# Patient Record
Sex: FEMALE | Race: OTHER | Employment: UNEMPLOYED
[De-identification: names, ages, dates, MRNs, and addresses within clinical notes are randomized per-mention and may not be internally consistent; named-entity substitution may affect disease eponyms.]

---

## 2023-12-16 ENCOUNTER — HOSPITAL ENCOUNTER (EMERGENCY)
Facility: HOSPITAL | Age: 53
Discharge: HOME | End: 2023-12-17
Attending: EMERGENCY MEDICINE
Payer: MEDICAID

## 2023-12-16 ENCOUNTER — APPOINTMENT (OUTPATIENT)
Facility: HOSPITAL | Age: 53
End: 2023-12-16
Payer: MEDICAID

## 2023-12-16 DIAGNOSIS — R91.1 INCIDENTAL PULMONARY NODULE: ICD-10-CM

## 2023-12-16 DIAGNOSIS — N20.0 KIDNEY STONE: ICD-10-CM

## 2023-12-16 DIAGNOSIS — K59.00 CONSTIPATION, UNSPECIFIED CONSTIPATION TYPE: Primary | ICD-10-CM

## 2023-12-16 LAB
ALBUMIN SERPL-MCNC: 3.2 G/DL (ref 3.4–5)
ALBUMIN/GLOB SERPL: 0.8 (ref 0.8–1.7)
ALP SERPL-CCNC: 150 U/L (ref 45–117)
ALT SERPL-CCNC: 227 U/L (ref 13–56)
ANION GAP SERPL CALC-SCNC: 6 MMOL/L (ref 3–18)
APPEARANCE UR: CLEAR
AST SERPL-CCNC: 141 U/L (ref 10–38)
BASOPHILS # BLD: 0 K/UL (ref 0–0.1)
BASOPHILS NFR BLD: 1 % (ref 0–2)
BILIRUB SERPL-MCNC: 0.2 MG/DL (ref 0.2–1)
BILIRUB UR QL: NEGATIVE
BUN SERPL-MCNC: 14 MG/DL (ref 7–18)
BUN/CREAT SERPL: 18 (ref 12–20)
CALCIUM SERPL-MCNC: 9 MG/DL (ref 8.5–10.1)
CHLORIDE SERPL-SCNC: 106 MMOL/L (ref 100–111)
CO2 SERPL-SCNC: 29 MMOL/L (ref 21–32)
COLOR UR: YELLOW
CREAT SERPL-MCNC: 0.76 MG/DL (ref 0.6–1.3)
DIFFERENTIAL METHOD BLD: ABNORMAL
EOSINOPHIL # BLD: 0.2 K/UL (ref 0–0.4)
EOSINOPHIL NFR BLD: 3 % (ref 0–5)
ERYTHROCYTE [DISTWIDTH] IN BLOOD BY AUTOMATED COUNT: 14.1 % (ref 11.6–14.5)
GLOBULIN SER CALC-MCNC: 4.2 G/DL (ref 2–4)
GLUCOSE SERPL-MCNC: 103 MG/DL (ref 74–99)
GLUCOSE UR STRIP.AUTO-MCNC: NEGATIVE MG/DL
HCT VFR BLD AUTO: 35.2 % (ref 35–45)
HGB BLD-MCNC: 11.6 G/DL (ref 12–16)
HGB UR QL STRIP: NEGATIVE
IMM GRANULOCYTES # BLD AUTO: 0 K/UL (ref 0–0.04)
IMM GRANULOCYTES NFR BLD AUTO: 0 % (ref 0–0.5)
KETONES UR QL STRIP.AUTO: NEGATIVE MG/DL
LEUKOCYTE ESTERASE UR QL STRIP.AUTO: NEGATIVE
LYMPHOCYTES # BLD: 2.3 K/UL (ref 0.9–3.6)
LYMPHOCYTES NFR BLD: 42 % (ref 21–52)
MCH RBC QN AUTO: 30.3 PG (ref 24–34)
MCHC RBC AUTO-ENTMCNC: 33 G/DL (ref 31–37)
MCV RBC AUTO: 91.9 FL (ref 78–100)
MONOCYTES # BLD: 0.4 K/UL (ref 0.05–1.2)
MONOCYTES NFR BLD: 8 % (ref 3–10)
NEUTS SEG # BLD: 2.6 K/UL (ref 1.8–8)
NEUTS SEG NFR BLD: 46 % (ref 40–73)
NITRITE UR QL STRIP.AUTO: NEGATIVE
NRBC # BLD: 0 K/UL (ref 0–0.01)
NRBC BLD-RTO: 0 PER 100 WBC
PH UR STRIP: 6.5 (ref 5–8)
PLATELET # BLD AUTO: 274 K/UL (ref 135–420)
PMV BLD AUTO: 10.2 FL (ref 9.2–11.8)
POTASSIUM SERPL-SCNC: 3.9 MMOL/L (ref 3.5–5.5)
PROT SERPL-MCNC: 7.4 G/DL (ref 6.4–8.2)
PROT UR STRIP-MCNC: NEGATIVE MG/DL
RBC # BLD AUTO: 3.83 M/UL (ref 4.2–5.3)
SODIUM SERPL-SCNC: 141 MMOL/L (ref 136–145)
SP GR UR REFRACTOMETRY: 1.02 (ref 1–1.03)
UROBILINOGEN UR QL STRIP.AUTO: 0.2 EU/DL (ref 0.2–1)
WBC # BLD AUTO: 5.5 K/UL (ref 4.6–13.2)

## 2023-12-16 PROCEDURE — 2500000003 HC RX 250 WO HCPCS: Performed by: EMERGENCY MEDICINE

## 2023-12-16 PROCEDURE — 2580000003 HC RX 258: Performed by: EMERGENCY MEDICINE

## 2023-12-16 PROCEDURE — 81003 URINALYSIS AUTO W/O SCOPE: CPT

## 2023-12-16 PROCEDURE — 85025 COMPLETE CBC W/AUTO DIFF WBC: CPT

## 2023-12-16 PROCEDURE — 80053 COMPREHEN METABOLIC PANEL: CPT

## 2023-12-16 PROCEDURE — 74176 CT ABD & PELVIS W/O CONTRAST: CPT

## 2023-12-16 PROCEDURE — 6360000002 HC RX W HCPCS: Performed by: EMERGENCY MEDICINE

## 2023-12-16 RX ORDER — ONDANSETRON 2 MG/ML
4 INJECTION INTRAMUSCULAR; INTRAVENOUS
Status: COMPLETED | OUTPATIENT
Start: 2023-12-17 | End: 2023-12-16

## 2023-12-16 RX ORDER — ALPRAZOLAM 1 MG/1
1 TABLET ORAL 3 TIMES DAILY PRN
COMMUNITY

## 2023-12-16 RX ORDER — HYDROMORPHONE HYDROCHLORIDE 1 MG/ML
1 INJECTION, SOLUTION INTRAMUSCULAR; INTRAVENOUS; SUBCUTANEOUS
Status: COMPLETED | OUTPATIENT
Start: 2023-12-16 | End: 2023-12-16

## 2023-12-16 RX ORDER — TRAMADOL HYDROCHLORIDE 50 MG/1
50 TABLET ORAL EVERY 6 HOURS PRN
COMMUNITY

## 2023-12-16 RX ORDER — SODIUM CHLORIDE 9 MG/ML
INJECTION, SOLUTION INTRAVENOUS CONTINUOUS
Status: DISCONTINUED | OUTPATIENT
Start: 2023-12-16 | End: 2023-12-17 | Stop reason: HOSPADM

## 2023-12-16 RX ORDER — GABAPENTIN 400 MG/1
800 CAPSULE ORAL 4 TIMES DAILY
COMMUNITY

## 2023-12-16 RX ORDER — ONDANSETRON 4 MG/1
4 TABLET, FILM COATED ORAL EVERY 8 HOURS PRN
COMMUNITY

## 2023-12-16 RX ORDER — BUPROPION HYDROCHLORIDE 100 MG/1
100 TABLET ORAL 2 TIMES DAILY
COMMUNITY

## 2023-12-16 RX ORDER — LEVETIRACETAM 500 MG/1
1000 TABLET ORAL 2 TIMES DAILY
COMMUNITY

## 2023-12-16 RX ORDER — QUETIAPINE FUMARATE 300 MG/1
300 TABLET, FILM COATED ORAL 2 TIMES DAILY
COMMUNITY

## 2023-12-16 RX ORDER — ZOLPIDEM TARTRATE 10 MG/1
TABLET ORAL NIGHTLY PRN
COMMUNITY

## 2023-12-16 RX ORDER — DIPHENHYDRAMINE HYDROCHLORIDE 50 MG/ML
25 INJECTION INTRAMUSCULAR; INTRAVENOUS
Status: COMPLETED | OUTPATIENT
Start: 2023-12-16 | End: 2023-12-16

## 2023-12-16 RX ORDER — ONDANSETRON 2 MG/ML
4 INJECTION INTRAMUSCULAR; INTRAVENOUS
Status: COMPLETED | OUTPATIENT
Start: 2023-12-16 | End: 2023-12-16

## 2023-12-16 RX ADMIN — DIPHENHYDRAMINE HYDROCHLORIDE 25 MG: 50 INJECTION, SOLUTION INTRAMUSCULAR; INTRAVENOUS at 22:45

## 2023-12-16 RX ADMIN — SODIUM CHLORIDE: 9 INJECTION, SOLUTION INTRAVENOUS at 22:44

## 2023-12-16 RX ADMIN — ONDANSETRON 4 MG: 2 INJECTION INTRAMUSCULAR; INTRAVENOUS at 22:45

## 2023-12-16 RX ADMIN — HYDROMORPHONE HYDROCHLORIDE 1 MG: 1 INJECTION, SOLUTION INTRAMUSCULAR; INTRAVENOUS; SUBCUTANEOUS at 22:45

## 2023-12-16 RX ADMIN — ONDANSETRON 4 MG: 2 INJECTION INTRAMUSCULAR; INTRAVENOUS at 23:58

## 2023-12-16 ASSESSMENT — LIFESTYLE VARIABLES
HOW OFTEN DO YOU HAVE A DRINK CONTAINING ALCOHOL: NEVER
HOW MANY STANDARD DRINKS CONTAINING ALCOHOL DO YOU HAVE ON A TYPICAL DAY: PATIENT DOES NOT DRINK

## 2023-12-16 ASSESSMENT — PAIN DESCRIPTION - ORIENTATION: ORIENTATION: LEFT

## 2023-12-16 ASSESSMENT — PAIN DESCRIPTION - LOCATION: LOCATION: FLANK

## 2023-12-16 ASSESSMENT — PAIN SCALES - GENERAL
PAINLEVEL_OUTOF10: 10
PAINLEVEL_OUTOF10: 7

## 2023-12-16 ASSESSMENT — PAIN - FUNCTIONAL ASSESSMENT: PAIN_FUNCTIONAL_ASSESSMENT: 0-10

## 2023-12-17 VITALS
SYSTOLIC BLOOD PRESSURE: 109 MMHG | TEMPERATURE: 98 F | BODY MASS INDEX: 31.76 KG/M2 | HEART RATE: 104 BPM | DIASTOLIC BLOOD PRESSURE: 36 MMHG | OXYGEN SATURATION: 98 % | WEIGHT: 186 LBS | RESPIRATION RATE: 20 BRPM | HEIGHT: 64 IN

## 2023-12-17 RX ORDER — POLYETHYLENE GLYCOL 3350 17 G/17G
17 POWDER, FOR SOLUTION ORAL DAILY PRN
Qty: 225 G | Refills: 0 | Status: SHIPPED | OUTPATIENT
Start: 2023-12-17 | End: 2024-01-16

## 2023-12-17 RX ORDER — DICYCLOMINE HCL 20 MG
20 TABLET ORAL 4 TIMES DAILY
Qty: 20 TABLET | Refills: 0 | Status: SHIPPED | OUTPATIENT
Start: 2023-12-17

## 2023-12-17 NOTE — ED NOTES
I have reviewed discharge instructions with the patient. The patient verbalized understanding. Discharge medications reviewed with the patient and appropriate educational materials and side effects teaching were provided.        Jason Fernandez RN  12/17/23 0178

## 2023-12-17 NOTE — ED NOTES
Pt placed on 2L/nc O2 when her sats dropped to 88% after pain meds were given     Scott Garcia RN  12/16/23 7523

## 2023-12-17 NOTE — ED TRIAGE NOTES
Pt states she has one kidney. Has been having left flank pain for the last 3 days. Also c/o pain, numbness and tingling in her left leg. Has hx of injuries and sciatica. Pt states she does not have a doctor here. Just moved here from [place].  States her son dropped her off

## 2023-12-29 PROBLEM — G43.909 MIGRAINE HEADACHE: Status: ACTIVE | Noted: 2023-12-29

## 2023-12-29 PROBLEM — M54.16 LUMBAR RADICULOPATHY: Status: ACTIVE | Noted: 2023-12-29

## 2023-12-29 PROBLEM — R56.9 SEIZURES (HCC): Status: ACTIVE | Noted: 2023-12-29

## 2023-12-29 PROBLEM — G40.909 SEIZURE DISORDER (HCC): Status: ACTIVE | Noted: 2023-12-29

## 2024-01-08 ENCOUNTER — TELEPHONE (OUTPATIENT)
Facility: CLINIC | Age: 54
End: 2024-01-08

## 2024-01-08 NOTE — TELEPHONE ENCOUNTER
Pt called in reference to appt on Wednesday at 2:30 pm. Pt previously cancelled appt on 12/26/23 and rescheduled for 01/02/24. Pt unfortunately no showed appt on 01/02/24. We have a 'No Show New Patient Policy.' Pt cannot be rescheduled at our office.     Pt did not answer phone call, lvm for pt to return call.

## 2024-01-12 PROBLEM — N20.0 KIDNEY STONES: Status: ACTIVE | Noted: 2024-01-12

## 2024-01-12 PROBLEM — N20.0 KIDNEY STONES: Status: RESOLVED | Noted: 2024-01-12 | Resolved: 2024-01-12

## 2024-01-12 PROBLEM — R91.1 PULMONARY NODULE SEEN ON IMAGING STUDY: Status: ACTIVE | Noted: 2024-01-12

## 2024-01-25 ENCOUNTER — TELEPHONE (OUTPATIENT)
Age: 54
End: 2024-01-25

## 2024-01-25 ENCOUNTER — OFFICE VISIT (OUTPATIENT)
Age: 54
End: 2024-01-25
Payer: MEDICAID

## 2024-01-25 VITALS
BODY MASS INDEX: 35.34 KG/M2 | DIASTOLIC BLOOD PRESSURE: 72 MMHG | SYSTOLIC BLOOD PRESSURE: 92 MMHG | HEIGHT: 64 IN | WEIGHT: 207 LBS | HEART RATE: 110 BPM | RESPIRATION RATE: 17 BRPM | TEMPERATURE: 97.5 F | OXYGEN SATURATION: 97 %

## 2024-01-25 DIAGNOSIS — J45.40 MODERATE PERSISTENT ASTHMA WITHOUT COMPLICATION: ICD-10-CM

## 2024-01-25 DIAGNOSIS — G43.909 MIGRAINE WITHOUT STATUS MIGRAINOSUS, NOT INTRACTABLE, UNSPECIFIED MIGRAINE TYPE: ICD-10-CM

## 2024-01-25 DIAGNOSIS — Z76.89 ENCOUNTER TO ESTABLISH CARE: Primary | ICD-10-CM

## 2024-01-25 DIAGNOSIS — R91.1 PULMONARY NODULE SEEN ON IMAGING STUDY: ICD-10-CM

## 2024-01-25 DIAGNOSIS — R56.9 SEIZURES (HCC): ICD-10-CM

## 2024-01-25 DIAGNOSIS — G89.4 CHRONIC PAIN SYNDROME: ICD-10-CM

## 2024-01-25 DIAGNOSIS — M54.16 LUMBAR RADICULOPATHY: ICD-10-CM

## 2024-01-25 DIAGNOSIS — Z79.899 POLYPHARMACY: ICD-10-CM

## 2024-01-25 PROCEDURE — 99204 OFFICE O/P NEW MOD 45 MIN: CPT | Performed by: FAMILY MEDICINE

## 2024-01-25 RX ORDER — LEVETIRACETAM 1000 MG/1
TABLET ORAL
Qty: 180 TABLET | Refills: 1 | Status: SHIPPED | OUTPATIENT
Start: 2024-01-25

## 2024-01-25 RX ORDER — NEBULIZER ACCESSORIES
1 KIT MISCELLANEOUS DAILY PRN
Qty: 1 KIT | Refills: 0 | Status: SHIPPED | OUTPATIENT
Start: 2024-01-25

## 2024-01-25 RX ORDER — LEVETIRACETAM 1000 MG/1
TABLET ORAL
Qty: 60 TABLET | Refills: 3 | Status: SHIPPED | OUTPATIENT
Start: 2024-01-25 | End: 2024-01-25

## 2024-01-25 RX ORDER — TRAMADOL HYDROCHLORIDE 50 MG/1
100 TABLET ORAL EVERY 6 HOURS PRN
Qty: 90 TABLET | Refills: 0 | Status: SHIPPED | OUTPATIENT
Start: 2024-01-25 | End: 2024-02-24

## 2024-01-25 RX ORDER — PREGABALIN 100 MG/1
100 CAPSULE ORAL 2 TIMES DAILY
Qty: 60 CAPSULE | Refills: 0 | Status: SHIPPED | OUTPATIENT
Start: 2024-01-25 | End: 2024-02-24

## 2024-01-25 ASSESSMENT — PATIENT HEALTH QUESTIONNAIRE - PHQ9
SUM OF ALL RESPONSES TO PHQ9 QUESTIONS 1 & 2: 0
2. FEELING DOWN, DEPRESSED OR HOPELESS: 0
1. LITTLE INTEREST OR PLEASURE IN DOING THINGS: 0
SUM OF ALL RESPONSES TO PHQ QUESTIONS 1-9: 0

## 2024-01-25 ASSESSMENT — ENCOUNTER SYMPTOMS
BACK PAIN: 1
ABDOMINAL PAIN: 0
SHORTNESS OF BREATH: 0
COUGH: 0
CONSTIPATION: 1
WHEEZING: 1

## 2024-01-25 NOTE — PROGRESS NOTES
Garrison Buenrostro is a 53 y.o. female  Chief Complaint   Patient presents with    New Patient     \"Have you been to the ER, urgent care clinic since your last visit?  Hospitalized since your last visit?\"    YES - When: approximately 3  weeks ago.  Where and Why: [facility] ED for constipation on 12/16/2023.    “Have you seen or consulted any other health care providers outside of [facility] since your last visit?”    NO    “Have you had a colorectal cancer screening such as a colonoscopy/FIT/Cologuard?    YES - Type: Colonoscopy - Where: [facility] Nurse/CMA to request most recent records if not in the chart      Have you had a mammogram?”   YES - Where: [facility] Nurse/CMA to request most recent records if not in the chart     “Have you had a pap smear?”    YES - Where: [facility] Nurse/CMA to request most recent records if not in the chart

## 2024-01-25 NOTE — TELEPHONE ENCOUNTER
Patient in office requesting medications that were sent to the pharmacy to be changed to 90 day supply. Please advise, thank you.

## 2024-01-25 NOTE — PROGRESS NOTES
[facility]  Establish care visit   2024    Garrison Buenrostro (: 1970) is a 53 y.o. female, here to establish care.    Chief Complaint   Patient presents with    New Patient    Cough     Cough w/ wheezing x3days        ASSESSMENT/ PLAN  1. Encounter to establish care  VS reviewed     BMI reviewed   Appropriate healthy lifestyle modifications discussed.  All questions answered.   F/u discussed.    2. Lumbar radiculopathy  Uncontrolled   Has tolerated PT and HEP but no improvement and she would prefer not to try it again even though I recommended this  Injections helped and she would like to be evaluated for another one.   For now, continue lyrica and tramadol.   Follow up in 3 months to gauge improvement   - Ambulatory referral to Orthopedic Surgery  - pregabalin (LYRICA) 100 MG capsule; Take 1 capsule by mouth 2 times daily for 30 days. Max Daily Amount: 200 mg  Dispense: 60 capsule; Refill: 0    3. Moderate persistent asthma without complication  Uncontrolled on symbicort and albuterol.   She politely declines more inhalers and would like to see a specialist on account of the pulmonary nodule found.   Consider triple therapy inhaler  Refilled albuterol and neb med for now.   Needs new nebulizer.   Referral placed per patient request.   - Respiratory Therapy Supplies (NEBULIZER/TUBING/MOUTHPIECE) KIT; 1 kit by Does not apply route daily as needed (sob and wheezing)  Dispense: 1 kit; Refill: 0  - [facility] - Get Tate DO, Pulmonology, [place] [address]  - albuterol (PROVENTIL) (5 MG/ML) 0.5% nebulizer solution; Take 0.5 mLs by nebulization 4 times daily as needed for Wheezing  Dispense: 120 each; Refill: 3    4. Pulmonary nodule seen on imaging study  As above.   - [facility] - Get Tate DO, Pulmonology, [place] [address]    5. Seizures (HCC)  Controlled on keppra.   No neuro on board.   Follow up in 3 months.   - levETIRAcetam (KEPPRA) 1000 MG tablet; Take one tab twice a day  Dispense:

## 2024-01-26 NOTE — TELEPHONE ENCOUNTER
Pt returned call to [facility] and was advised that Tramadol and Lyrica are controlled substances and Dr. Rothman will only allow a 30 day supply and she will not write for more than 30 days at a time. Keppra has been changed to a 90 day supply. Pt asked does she have to come in each time for a refill and I advised her that she can call or send a [email or web address] message when she runs low.

## 2024-01-31 DIAGNOSIS — J45.40 MODERATE PERSISTENT ASTHMA WITHOUT COMPLICATION: ICD-10-CM

## 2024-01-31 RX ORDER — ALBUTEROL SULFATE 90 UG/1
2 AEROSOL, METERED RESPIRATORY (INHALATION) EVERY 4 HOURS PRN
Qty: 18 G | Refills: 3 | Status: SHIPPED | OUTPATIENT
Start: 2024-01-31

## 2024-01-31 NOTE — TELEPHONE ENCOUNTER
LOV 01/25/2024   F/U 04/25/2024  Patient states she was also supposed to get albuterol inhaler, not only solution for nebulizer.   Additionally, [facility] did not have the 5MG/5ML albuterol solution so she wants us to send both inhaler and solution to [facility].

## 2024-02-01 ENCOUNTER — TELEPHONE (OUTPATIENT)
Age: 54
End: 2024-02-01

## 2024-02-01 NOTE — TELEPHONE ENCOUNTER
Pt states that she has an appt for breathing test is 2/19/2024. Then orthopedic referral states that she can't get a hold of them to schedule appt . Pain management she can't get a hold of them at. Pt states that she is in a lot of pain with her back. Can you please help her get scheduled. Please advise.

## 2024-02-01 NOTE — TELEPHONE ENCOUNTER
Naeem for the patient to rtc. I called over to [facility] and they answered, she can contact them directly at [phone]. Her referral to pain management was forwarded to:    [facility]  Place of Service  Contact Information    Phone Appt Phone Fax Address   [phone] Not available [phone] [address] [place] [address]       They will contact her to schedule an appt.

## 2024-02-22 DIAGNOSIS — M54.16 LUMBAR RADICULOPATHY: ICD-10-CM

## 2024-02-22 DIAGNOSIS — G89.4 CHRONIC PAIN SYNDROME: ICD-10-CM

## 2024-02-22 RX ORDER — PREGABALIN 100 MG/1
100 CAPSULE ORAL 2 TIMES DAILY
Qty: 60 CAPSULE | Refills: 0 | Status: SHIPPED | OUTPATIENT
Start: 2024-02-22 | End: 2024-03-23

## 2024-02-22 RX ORDER — TRAMADOL HYDROCHLORIDE 50 MG/1
100 TABLET ORAL EVERY 6 HOURS PRN
Qty: 30 TABLET | Refills: 0 | Status: SHIPPED | OUTPATIENT
Start: 2024-02-22 | End: 2024-03-23

## 2024-02-22 NOTE — TELEPHONE ENCOUNTER
This patient contacted the office for the following prescriptions to be refilled:    Medication requested :   Requested Prescriptions     Pending Prescriptions Disp Refills    traMADol (ULTRAM) 50 MG tablet [Pharmacy Med Name: traMADol HCL 50MG TABLET] 90 tablet      Sig: TAKE 2 TABLETS BY MOUTH EVERY 6 HOUR AS NEEDED FOR PAIN FOR UP TO 30 DAYS.    pregabalin (LYRICA) 100 MG capsule [Pharmacy Med Name: PREGABALIN 100 MG CAPSULE] 60 capsule      Sig: Take 1 capsule by mouth 2 times daily.      LAST REFILLED: 1/25/2024  PCP: Yasmeen Rothman MD  LOV: 1/25/2024  NOV: 4/25/2024  FUTURE APPT:   Future Appointments   Date Time Provider Department Center   4/25/2024  2:45 PM Yasmeen Rothman MD [facility] BS AMB   5/16/2024  1:30 PM Get Tate DO [facility] BS AMB     LABS:   Results for orders placed or performed during the hospital encounter of 12/16/23 (from the past 2160 hour(s))   CT ABDOMEN PELVIS WO CONTRAST Additional Contrast? None    Impression    1. At least 3 nonobstructing calculi in the left kidney. Largest 6.4 mm in the  upper pole.  2. Multiple less than 6 mm pulmonary nodules in the lung bases. Optional  follow-up CT in 12 months.  3. Severe fecal retention throughout colon. No obstruction. Potential  constipation.   Urinalysis   Result Value Ref Range    Color, UA YELLOW      Appearance CLEAR      Specific Gravity, UA 1.018 1.005 - 1.030      pH, Urine 6.5 5.0 - 8.0      Protein, UA Negative NEG mg/dL    Glucose, UA Negative NEG mg/dL    Ketones, Urine Negative NEG mg/dL    Bilirubin Urine Negative NEG      Blood, Urine Negative NEG      Urobilinogen, Urine 0.2 0.2 - 1.0 EU/dL    Nitrite, Urine Negative NEG      Leukocyte Esterase, Urine Negative NEG     CBC with Auto Differential   Result Value Ref Range    WBC 5.5 4.6 - 13.2 K/uL    RBC 3.83 (L) 4.20 - 5.30 M/uL    Hemoglobin 11.6 (L) 12.0 - 16.0 g/dL    Hematocrit 35.2 35.0 - 45.0 %    MCV 91.9 78.0 - 100.0 FL    MCH 30.3 24.0 - 34.0 PG    MCHC 33.0

## 2024-02-22 NOTE — TELEPHONE ENCOUNTER
Spoke with:      Type Date User Summary Attachment   General 02/01/2024 12:58 PM Ramya Aguilar MA - -   Note:  Patient referral, demographics, and notes has been forwarded to:     [facility]  Place of Service  Contact Information     Phone Appt Phone Fax Address   [phone] Not available [phone] [address] [place] [address]           To confirm if the patient have been scheduled and they stated that did not receive the referral. I will re-forward the referral and they will contact the patient to schedule.    Also, I checked on the status of her referral for Ortho and they have attempted reaching her multiple times for an appt and she have not returned their call.

## 2024-02-23 ENCOUNTER — TELEPHONE (OUTPATIENT)
Age: 54
End: 2024-02-23

## 2024-02-23 NOTE — TELEPHONE ENCOUNTER
Fax received from [facility] meds stating prior auth is required for the traMADol (ULTRAM) 50 MG tablet .  Submit a PA request  1. Go to [email or web address] and click \"Enter a Key\"  2. Patient last name: Porter      : 1970      Key: [id number]  3. Click \"start a PA\", complete the form, and \"send to plan\"

## 2024-02-29 DIAGNOSIS — G89.4 CHRONIC PAIN SYNDROME: ICD-10-CM

## 2024-03-01 NOTE — TELEPHONE ENCOUNTER
14.1 11.6 - 14.5 %    Platelets 274 135 - 420 K/uL    MPV 10.2 9.2 - 11.8 FL    Nucleated RBCs 0.0 0  WBC    nRBC 0.00 0.00 - 0.01 K/uL    Neutrophils % 46 40 - 73 %    Lymphocytes % 42 21 - 52 %    Monocytes % 8 3 - 10 %    Eosinophils % 3 0 - 5 %    Basophils % 1 0 - 2 %    Immature Granulocytes 0 0.0 - 0.5 %    Neutrophils Absolute 2.6 1.8 - 8.0 K/UL    Lymphocytes Absolute 2.3 0.9 - 3.6 K/UL    Monocytes Absolute 0.4 0.05 - 1.2 K/UL    Eosinophils Absolute 0.2 0.0 - 0.4 K/UL    Basophils Absolute 0.0 0.0 - 0.1 K/UL    Absolute Immature Granulocyte 0.0 0.00 - 0.04 K/UL    Differential Type AUTOMATED     Comprehensive Metabolic Panel   Result Value Ref Range    Sodium 141 136 - 145 mmol/L    Potassium 3.9 3.5 - 5.5 mmol/L    Chloride 106 100 - 111 mmol/L    CO2 29 21 - 32 mmol/L    Anion Gap 6 3.0 - 18 mmol/L    Glucose 103 (H) 74 - 99 mg/dL    BUN 14 7.0 - 18 MG/DL    Creatinine 0.76 0.6 - 1.3 MG/DL    Bun/Cre Ratio 18 12 - 20      Est, Glom Filt Rate >60 >60 ml/min/1.73m2    Calcium 9.0 8.5 - 10.1 MG/DL    Total Bilirubin 0.2 0.2 - 1.0 MG/DL     (H) 13 - 56 U/L     (H) 10 - 38 U/L    Alk Phosphatase 150 (H) 45 - 117 U/L    Total Protein 7.4 6.4 - 8.2 g/dL    Albumin 3.2 (L) 3.4 - 5.0 g/dL    Globulin 4.2 (H) 2.0 - 4.0 g/dL    Albumin/Globulin Ratio 0.8 0.8 - 1.7           Thank you.

## 2024-03-03 RX ORDER — TRAMADOL HYDROCHLORIDE 50 MG/1
100 TABLET ORAL EVERY 6 HOURS PRN
Qty: 30 TABLET | Refills: 0 | Status: SHIPPED | OUTPATIENT
Start: 2024-03-03 | End: 2024-04-02

## 2024-03-12 ENCOUNTER — TELEPHONE (OUTPATIENT)
Age: 54
End: 2024-03-12

## 2024-03-12 DIAGNOSIS — G89.4 CHRONIC PAIN SYNDROME: ICD-10-CM

## 2024-03-12 DIAGNOSIS — M54.16 LUMBAR RADICULOPATHY: ICD-10-CM

## 2024-03-12 RX ORDER — PREGABALIN 100 MG/1
100 CAPSULE ORAL 2 TIMES DAILY
Qty: 60 CAPSULE | Refills: 0 | Status: SHIPPED | OUTPATIENT
Start: 2024-03-21 | End: 2024-04-20

## 2024-03-12 NOTE — TELEPHONE ENCOUNTER
This patient contacted office for the following prescriptions to be filled:    Medication requested :   traMADol (ULTRAM) 50 MG tablet  Qty 240 taking 2 pills every 6 hrs for pain  PCP: Lorna  Pharmacy or Print: [facility]  Mail order or Local pharmacy [address]     Scheduled appointment if not seen by current providers in office: LOV 3/4/2024 ANASTASIA 4/25/2024

## 2024-03-12 NOTE — TELEPHONE ENCOUNTER
This patient contacted the office for the following prescriptions to be refilled:    Medication requested :   Requested Prescriptions     Pending Prescriptions Disp Refills    pregabalin (LYRICA) 100 MG capsule [Pharmacy Med Name: PREGABALIN 100 MG CAPSULE] 60 capsule      Sig: Take 1 capsule by mouth 2 times daily.      LAST REFILLED: 2/22/2024  PCP: Yasmeen Rothman MD  LOV: 1/25/2024  NOV: 4/25/2024  FUTURE APPT:   Future Appointments   Date Time Provider Department Center   4/25/2024  2:45 PM Yasmeen Rothman MD [facility] BS AMB   5/16/2024  1:30 PM Get Tate,  [facility] BS AMB     LABS:   Results for orders placed or performed during the hospital encounter of 12/16/23 (from the past 2160 hour(s))   CT ABDOMEN PELVIS WO CONTRAST Additional Contrast? None    Impression    1. At least 3 nonobstructing calculi in the left kidney. Largest 6.4 mm in the  upper pole.  2. Multiple less than 6 mm pulmonary nodules in the lung bases. Optional  follow-up CT in 12 months.  3. Severe fecal retention throughout colon. No obstruction. Potential  constipation.   Urinalysis   Result Value Ref Range    Color, UA YELLOW      Appearance CLEAR      Specific Gravity, UA 1.018 1.005 - 1.030      pH, Urine 6.5 5.0 - 8.0      Protein, UA Negative NEG mg/dL    Glucose, UA Negative NEG mg/dL    Ketones, Urine Negative NEG mg/dL    Bilirubin Urine Negative NEG      Blood, Urine Negative NEG      Urobilinogen, Urine 0.2 0.2 - 1.0 EU/dL    Nitrite, Urine Negative NEG      Leukocyte Esterase, Urine Negative NEG     CBC with Auto Differential   Result Value Ref Range    WBC 5.5 4.6 - 13.2 K/uL    RBC 3.83 (L) 4.20 - 5.30 M/uL    Hemoglobin 11.6 (L) 12.0 - 16.0 g/dL    Hematocrit 35.2 35.0 - 45.0 %    MCV 91.9 78.0 - 100.0 FL    MCH 30.3 24.0 - 34.0 PG    MCHC 33.0 31.0 - 37.0 g/dL    RDW 14.1 11.6 - 14.5 %    Platelets 274 135 - 420 K/uL    MPV 10.2 9.2 - 11.8 FL    Nucleated RBCs 0.0 0  WBC    nRBC 0.00 0.00 - 0.01 K/uL

## 2024-03-13 RX ORDER — TRAMADOL HYDROCHLORIDE 50 MG/1
100 TABLET ORAL EVERY 6 HOURS PRN
Qty: 30 TABLET | Refills: 0 | Status: SHIPPED | OUTPATIENT
Start: 2024-03-13 | End: 2024-03-27

## 2024-03-13 NOTE — TELEPHONE ENCOUNTER
March 3, 2024  Yasmeen Rothman MD   to Me     3/3/24  5:08 PM  Has patient found a pain management clinic yet? I can refill for now but I'd prefer them have a pain doc to manage this.  Thank you!  Dr. CAMPA    Patient have not contacted [facility] to schedule an appointment, they have tried contacting her multiple times. I have left her messages as well and she no showed her last appt.

## 2024-03-13 NOTE — TELEPHONE ENCOUNTER
Please let patient know this is the last refill I will provide. It is a 14 day script, 30 tablets.     Orders Placed This Encounter    traMADol (ULTRAM) 50 MG tablet     Sig: Take 2 tablets by mouth every 6 hours as needed for Pain for up to 14 days. Max Daily Amount: 400 mg     Dispense:  30 tablet     Refill:  0     Reduce doses taken as pain becomes manageable     Last PDMP Daniel as Reviewed:  Review User Review Instant Review Result   SHANDA MUNOZ 3/13/2024  3:01 PM Reviewed PDMP [1]     Thank you!  Dr. CAMPA

## 2024-03-13 NOTE — TELEPHONE ENCOUNTER
Patient called to follow up on refill. I advised that she needs to establish with [facility] as soon as possible, as this is not a  medication Dr. Rothman manages on a chronic basis.  Patient stated her phone has been messing up and that is probably why she has not received any calls from [facility]. I gave her the number for [facility] again. This information has been provided to the patient several times.   Patient is still requesting Dr. Rothman consider giving her a refill. I advised we will send back for review but could not guarantee a refill would be given.     Patient called back at 2:56p and said she has an appt with [facility] on 4/21/2024

## 2024-03-13 NOTE — TELEPHONE ENCOUNTER
Patient is aware and agrees to keep her scheduled appt on 04/21/24 with [facility]  Place of Service  Contact Information    Phone Appt Phone Fax Address   [phone] Not available [phone] [address] [id number]

## 2024-03-20 ENCOUNTER — TELEPHONE (OUTPATIENT)
Age: 54
End: 2024-03-20

## 2024-03-20 NOTE — TELEPHONE ENCOUNTER
The doctor-patient relationship, in this case, has been affected.  I do not think her actions are appropriate.  The office staff and myself have been very clear with referrals and boundaries.     Thank you,   Dr. CAMPA

## 2024-03-20 NOTE — TELEPHONE ENCOUNTER
Patient is wanting for her tramadol to be refilled again although we have advised we would not be providing anymore refills.. She is also requesting for Dr. Rothman to call in for an exception to have Lyrica filled early.   I advised patient I would send a message back, but patient wanted to explain why pain management and physical therapy would not work. I had to put the patient on hold while taking care of other patient.     Patient called back up set that she was put on hold for a long period of time. I did advise I spoke with Dr. Rothman and that at this time we will not be refilling tramadol anymore and we will not be requesting an early fill on the lyrica.     Patient ended call.

## 2024-05-03 DIAGNOSIS — M54.16 LUMBAR RADICULOPATHY: ICD-10-CM

## 2024-05-06 RX ORDER — PREGABALIN 100 MG/1
CAPSULE ORAL
Qty: 60 CAPSULE | OUTPATIENT
Start: 2024-05-06

## 2024-05-24 ENCOUNTER — HOSPITAL ENCOUNTER (INPATIENT)
Facility: HOSPITAL | Age: 54
LOS: 3 days | Discharge: HOME | DRG: 465 | End: 2024-05-27
Attending: STUDENT IN AN ORGANIZED HEALTH CARE EDUCATION/TRAINING PROGRAM | Admitting: HOSPITALIST
Payer: MEDICAID

## 2024-05-24 ENCOUNTER — APPOINTMENT (OUTPATIENT)
Facility: HOSPITAL | Age: 54
DRG: 465 | End: 2024-05-24
Payer: MEDICAID

## 2024-05-24 DIAGNOSIS — N20.0 KIDNEY STONE: Primary | ICD-10-CM

## 2024-05-24 DIAGNOSIS — M54.16 LUMBAR RADICULOPATHY: ICD-10-CM

## 2024-05-24 DIAGNOSIS — N23 URETERAL COLIC: ICD-10-CM

## 2024-05-24 LAB
ALBUMIN SERPL-MCNC: 3.1 G/DL (ref 3.4–5)
ALBUMIN/GLOB SERPL: 0.8 (ref 0.8–1.7)
ALP SERPL-CCNC: 97 U/L (ref 45–117)
ALT SERPL-CCNC: 51 U/L (ref 13–56)
ANION GAP SERPL CALC-SCNC: 3 MMOL/L (ref 3–18)
APPEARANCE UR: CLEAR
AST SERPL-CCNC: 44 U/L (ref 10–38)
BASOPHILS # BLD: 0 K/UL (ref 0–0.1)
BASOPHILS NFR BLD: 0 % (ref 0–2)
BILIRUB SERPL-MCNC: 0.2 MG/DL (ref 0.2–1)
BILIRUB UR QL: NEGATIVE
BUN SERPL-MCNC: 12 MG/DL (ref 7–18)
BUN/CREAT SERPL: 17 (ref 12–20)
CALCIUM SERPL-MCNC: 9 MG/DL (ref 8.5–10.1)
CHLORIDE SERPL-SCNC: 106 MMOL/L (ref 100–111)
CO2 SERPL-SCNC: 29 MMOL/L (ref 21–32)
COLOR UR: YELLOW
CREAT SERPL-MCNC: 0.7 MG/DL (ref 0.6–1.3)
DIFFERENTIAL METHOD BLD: ABNORMAL
EOSINOPHIL # BLD: 0.3 K/UL (ref 0–0.4)
EOSINOPHIL NFR BLD: 6 % (ref 0–5)
EPITH CASTS URNS QL MICRO: NORMAL /LPF (ref 0–5)
ERYTHROCYTE [DISTWIDTH] IN BLOOD BY AUTOMATED COUNT: 13.1 % (ref 11.6–14.5)
GLOBULIN SER CALC-MCNC: 3.7 G/DL (ref 2–4)
GLUCOSE SERPL-MCNC: 97 MG/DL (ref 74–99)
GLUCOSE UR STRIP.AUTO-MCNC: NEGATIVE MG/DL
HCG UR QL: NEGATIVE
HCT VFR BLD AUTO: 34.5 % (ref 35–45)
HGB BLD-MCNC: 11.3 G/DL (ref 12–16)
HGB UR QL STRIP: ABNORMAL
IMM GRANULOCYTES # BLD AUTO: 0 K/UL (ref 0–0.04)
IMM GRANULOCYTES NFR BLD AUTO: 0 % (ref 0–0.5)
KETONES UR QL STRIP.AUTO: NEGATIVE MG/DL
LEUKOCYTE ESTERASE UR QL STRIP.AUTO: ABNORMAL
LIPASE SERPL-CCNC: 21 U/L (ref 13–75)
LYMPHOCYTES # BLD: 2 K/UL (ref 0.9–3.6)
LYMPHOCYTES NFR BLD: 39 % (ref 21–52)
MCH RBC QN AUTO: 29.4 PG (ref 24–34)
MCHC RBC AUTO-ENTMCNC: 32.8 G/DL (ref 31–37)
MCV RBC AUTO: 89.8 FL (ref 78–100)
MONOCYTES # BLD: 0.4 K/UL (ref 0.05–1.2)
MONOCYTES NFR BLD: 7 % (ref 3–10)
NEUTS SEG # BLD: 2.4 K/UL (ref 1.8–8)
NEUTS SEG NFR BLD: 47 % (ref 40–73)
NITRITE UR QL STRIP.AUTO: NEGATIVE
NRBC # BLD: 0 K/UL (ref 0–0.01)
NRBC BLD-RTO: 0 PER 100 WBC
PH UR STRIP: 5.5 (ref 5–8)
PLATELET # BLD AUTO: 181 K/UL (ref 135–420)
PMV BLD AUTO: 10.5 FL (ref 9.2–11.8)
POTASSIUM SERPL-SCNC: 4 MMOL/L (ref 3.5–5.5)
PROT SERPL-MCNC: 6.8 G/DL (ref 6.4–8.2)
PROT UR STRIP-MCNC: NEGATIVE MG/DL
RBC # BLD AUTO: 3.84 M/UL (ref 4.2–5.3)
RBC #/AREA URNS HPF: NORMAL /HPF (ref 0–5)
SODIUM SERPL-SCNC: 138 MMOL/L (ref 136–145)
SP GR UR REFRACTOMETRY: 1.03 (ref 1–1.03)
UROBILINOGEN UR QL STRIP.AUTO: 1 EU/DL (ref 0.2–1)
WBC # BLD AUTO: 5 K/UL (ref 4.6–13.2)
WBC URNS QL MICRO: NORMAL /HPF (ref 0–4)

## 2024-05-24 PROCEDURE — 96375 TX/PRO/DX INJ NEW DRUG ADDON: CPT

## 2024-05-24 PROCEDURE — 80053 COMPREHEN METABOLIC PANEL: CPT

## 2024-05-24 PROCEDURE — 81025 URINE PREGNANCY TEST: CPT

## 2024-05-24 PROCEDURE — G0378 HOSPITAL OBSERVATION PER HR: HCPCS

## 2024-05-24 PROCEDURE — 6360000002 HC RX W HCPCS: Performed by: PHYSICIAN ASSISTANT

## 2024-05-24 PROCEDURE — 1100000000 HC RM PRIVATE

## 2024-05-24 PROCEDURE — 6360000002 HC RX W HCPCS: Performed by: EMERGENCY MEDICINE

## 2024-05-24 PROCEDURE — 96376 TX/PRO/DX INJ SAME DRUG ADON: CPT

## 2024-05-24 PROCEDURE — 51798 US URINE CAPACITY MEASURE: CPT

## 2024-05-24 PROCEDURE — 99285 EMERGENCY DEPT VISIT HI MDM: CPT

## 2024-05-24 PROCEDURE — 81001 URINALYSIS AUTO W/SCOPE: CPT

## 2024-05-24 PROCEDURE — 83690 ASSAY OF LIPASE: CPT

## 2024-05-24 PROCEDURE — 96374 THER/PROPH/DIAG INJ IV PUSH: CPT

## 2024-05-24 PROCEDURE — 6370000000 HC RX 637 (ALT 250 FOR IP): Performed by: EMERGENCY MEDICINE

## 2024-05-24 PROCEDURE — 85025 COMPLETE CBC W/AUTO DIFF WBC: CPT

## 2024-05-24 PROCEDURE — 87086 URINE CULTURE/COLONY COUNT: CPT

## 2024-05-24 PROCEDURE — 74176 CT ABD & PELVIS W/O CONTRAST: CPT

## 2024-05-24 RX ORDER — ONDANSETRON 2 MG/ML
4 INJECTION INTRAMUSCULAR; INTRAVENOUS ONCE
Status: COMPLETED | OUTPATIENT
Start: 2024-05-24 | End: 2024-05-24

## 2024-05-24 RX ORDER — DIPHENHYDRAMINE HYDROCHLORIDE 50 MG/ML
12.5 INJECTION INTRAMUSCULAR; INTRAVENOUS
Status: COMPLETED | OUTPATIENT
Start: 2024-05-24 | End: 2024-05-24

## 2024-05-24 RX ORDER — HYDROMORPHONE HYDROCHLORIDE 1 MG/ML
1 INJECTION, SOLUTION INTRAMUSCULAR; INTRAVENOUS; SUBCUTANEOUS
Status: COMPLETED | OUTPATIENT
Start: 2024-05-24 | End: 2024-05-24

## 2024-05-24 RX ORDER — OXYCODONE HYDROCHLORIDE 5 MG/1
10 TABLET ORAL
Status: COMPLETED | OUTPATIENT
Start: 2024-05-24 | End: 2024-05-24

## 2024-05-24 RX ORDER — BUDESONIDE AND FORMOTEROL FUMARATE DIHYDRATE 80; 4.5 UG/1; UG/1
2 AEROSOL RESPIRATORY (INHALATION)
Status: CANCELLED | OUTPATIENT
Start: 2024-05-24

## 2024-05-24 RX ORDER — ATORVASTATIN CALCIUM 40 MG/1
40 TABLET, FILM COATED ORAL NIGHTLY
Status: CANCELLED | OUTPATIENT
Start: 2024-05-24

## 2024-05-24 RX ORDER — DIPHENHYDRAMINE HYDROCHLORIDE 50 MG/ML
25 INJECTION INTRAMUSCULAR; INTRAVENOUS
Status: COMPLETED | OUTPATIENT
Start: 2024-05-24 | End: 2024-05-24

## 2024-05-24 RX ORDER — KETOROLAC TROMETHAMINE 15 MG/ML
15 INJECTION, SOLUTION INTRAMUSCULAR; INTRAVENOUS ONCE
Status: DISCONTINUED | OUTPATIENT
Start: 2024-05-24 | End: 2024-05-24

## 2024-05-24 RX ORDER — BUPROPION HYDROCHLORIDE 150 MG/1
150 TABLET, EXTENDED RELEASE ORAL DAILY
Status: CANCELLED | OUTPATIENT
Start: 2024-05-25

## 2024-05-24 RX ORDER — TAMSULOSIN HYDROCHLORIDE 0.4 MG/1
0.4 CAPSULE ORAL
Status: COMPLETED | OUTPATIENT
Start: 2024-05-24 | End: 2024-05-24

## 2024-05-24 RX ADMIN — HYDROMORPHONE HYDROCHLORIDE 0.5 MG: 1 INJECTION, SOLUTION INTRAMUSCULAR; INTRAVENOUS; SUBCUTANEOUS at 21:44

## 2024-05-24 RX ADMIN — DIPHENHYDRAMINE HYDROCHLORIDE 25 MG: 50 INJECTION, SOLUTION INTRAMUSCULAR; INTRAVENOUS at 21:42

## 2024-05-24 RX ADMIN — OXYCODONE HYDROCHLORIDE 10 MG: 5 TABLET ORAL at 19:48

## 2024-05-24 RX ADMIN — TAMSULOSIN HYDROCHLORIDE 0.4 MG: 0.4 CAPSULE ORAL at 20:32

## 2024-05-24 RX ADMIN — HYDROMORPHONE HYDROCHLORIDE 0.5 MG: 1 INJECTION, SOLUTION INTRAMUSCULAR; INTRAVENOUS; SUBCUTANEOUS at 17:46

## 2024-05-24 RX ADMIN — DIPHENHYDRAMINE HYDROCHLORIDE 12.5 MG: 50 INJECTION, SOLUTION INTRAMUSCULAR; INTRAVENOUS at 18:14

## 2024-05-24 RX ADMIN — HYDROMORPHONE HYDROCHLORIDE 1 MG: 1 INJECTION, SOLUTION INTRAMUSCULAR; INTRAVENOUS; SUBCUTANEOUS at 16:38

## 2024-05-24 RX ADMIN — DIPHENHYDRAMINE HYDROCHLORIDE 12.5 MG: 50 INJECTION, SOLUTION INTRAMUSCULAR; INTRAVENOUS at 16:38

## 2024-05-24 RX ADMIN — ONDANSETRON 4 MG: 2 INJECTION INTRAMUSCULAR; INTRAVENOUS at 16:38

## 2024-05-24 ASSESSMENT — PAIN DESCRIPTION - ORIENTATION
ORIENTATION: LEFT

## 2024-05-24 ASSESSMENT — PAIN DESCRIPTION - LOCATION
LOCATION: FLANK
LOCATION: ABDOMEN;BACK
LOCATION: FLANK
LOCATION: FLANK

## 2024-05-24 ASSESSMENT — PAIN DESCRIPTION - FREQUENCY: FREQUENCY: INTERMITTENT

## 2024-05-24 ASSESSMENT — PAIN - FUNCTIONAL ASSESSMENT
PAIN_FUNCTIONAL_ASSESSMENT: 0-10
PAIN_FUNCTIONAL_ASSESSMENT: ACTIVITIES ARE NOT PREVENTED

## 2024-05-24 ASSESSMENT — PAIN DESCRIPTION - DESCRIPTORS
DESCRIPTORS: SPASM
DESCRIPTORS: SPASM
DESCRIPTORS: SQUEEZING
DESCRIPTORS: SHARP

## 2024-05-24 ASSESSMENT — PAIN SCALES - GENERAL
PAINLEVEL_OUTOF10: 10
PAINLEVEL_OUTOF10: 7

## 2024-05-24 ASSESSMENT — PAIN DESCRIPTION - ONSET: ONSET: ON-GOING

## 2024-05-24 ASSESSMENT — PAIN DESCRIPTION - PAIN TYPE: TYPE: ACUTE PAIN

## 2024-05-24 NOTE — ED NOTES
Pt reports that she cannot take Toradol because it makes her tongue and mouth swell. States she can take Dilaudid with benadryl because it makes her legs tingle and itch. MD aware of all.

## 2024-05-24 NOTE — ED TRIAGE NOTES
Ambulatory to 8 with c/o severe pain to left flank and painful urination x 2 days. Attempted to get UA and pt states \"I am definitely going to need some pain medicine before I can give a urine sample\". Denies N/V/D.

## 2024-05-24 NOTE — ED PROVIDER NOTES
EMERGENCY DEPARTMENT HISTORY AND PHYSICAL EXAM    Date: 5/24/2024  Patient Name: Garrison Buenrostro    History of Presenting Illness     Chief Complaint:   Chief Complaint   Patient presents with    Flank Pain    Dysuria     History Provided By: patient    Additional History (Context): Garrison Buenrostro is a 53 y.o. female with hx of R nephrectomy d/t kidney stone, seizures, sciatica and PTSD, ambulatory to ED c/o left leg pain and dysuria x 2 days, notes urinary frequency.  Patient is concerned about kidney stones based on previous experience.   She is currently on amoxicillin for dental condition.  Denies nausea, vomiting, diarrhea, constipation.  No fevers, chills, abdominal pain.  Denies blood in urine or stool.  Reports regular BMs.  Patient denies possibility of pregnancy due to inactivity, she is perimenopausal, LMP this month was shorter than her typical menses.       PCP: Yasmeen Rothman MD    No current facility-administered medications for this encounter.     Current Outpatient Medications   Medication Sig Dispense Refill    pregabalin (LYRICA) 100 MG capsule Take 1 capsule by mouth 2 times daily for 30 days. Max Daily Amount: 200 mg 60 capsule 0    QUEtiapine (SEROQUEL XR) 300 MG extended release tablet       Nebulizers (VIOS AEROSOL DELIVERY SYSTEM) [facility] USE NEBULIZER MEDS AS DIRECTED FOR SHORTNESS OF BREATH.      albuterol sulfate HFA (PROVENTIL;VENTOLIN;PROAIR) 108 (90 Base) MCG/ACT inhaler Inhale 2 puffs into the lungs every 4 hours as needed for Wheezing 18 g 3    albuterol (PROVENTIL) (5 MG/ML) 0.5% nebulizer solution Take 0.5 mLs by nebulization 4 times daily as needed for Wheezing 120 each 3    levETIRAcetam (KEPPRA) 1000 MG tablet Take one tab twice a day 180 tablet 1    albuterol (PROVENTIL) (2.5 MG/3ML) 0.083% nebulizer solution Take 3 mLs by nebulization every 4 hours as needed for Wheezing      atorvastatin (LIPITOR) 40 MG tablet Take 1 tablet by mouth nightly      celecoxib (CELEBREX) 100 MG

## 2024-05-25 ENCOUNTER — APPOINTMENT (OUTPATIENT)
Facility: HOSPITAL | Age: 54
DRG: 465 | End: 2024-05-25
Payer: MEDICAID

## 2024-05-25 ENCOUNTER — ANESTHESIA EVENT (OUTPATIENT)
Facility: HOSPITAL | Age: 54
End: 2024-05-25
Payer: MEDICAID

## 2024-05-25 ENCOUNTER — ANESTHESIA (OUTPATIENT)
Facility: HOSPITAL | Age: 54
End: 2024-05-25
Payer: MEDICAID

## 2024-05-25 LAB
ANION GAP SERPL CALC-SCNC: 3 MMOL/L (ref 3–18)
BACTERIA SPEC CULT: NORMAL
BASOPHILS # BLD: 0 K/UL (ref 0–0.1)
BASOPHILS NFR BLD: 1 % (ref 0–2)
BUN SERPL-MCNC: 13 MG/DL (ref 7–18)
BUN/CREAT SERPL: 17 (ref 12–20)
CALCIUM SERPL-MCNC: 8.8 MG/DL (ref 8.5–10.1)
CHLORIDE SERPL-SCNC: 107 MMOL/L (ref 100–111)
CO2 SERPL-SCNC: 28 MMOL/L (ref 21–32)
CREAT SERPL-MCNC: 0.78 MG/DL (ref 0.6–1.3)
DIFFERENTIAL METHOD BLD: ABNORMAL
EOSINOPHIL # BLD: 0.2 K/UL (ref 0–0.4)
EOSINOPHIL NFR BLD: 5 % (ref 0–5)
ERYTHROCYTE [DISTWIDTH] IN BLOOD BY AUTOMATED COUNT: 13 % (ref 11.6–14.5)
GLUCOSE SERPL-MCNC: 89 MG/DL (ref 74–99)
HCT VFR BLD AUTO: 32.7 % (ref 35–45)
HGB BLD-MCNC: 10.3 G/DL (ref 12–16)
IMM GRANULOCYTES # BLD AUTO: 0 K/UL (ref 0–0.04)
IMM GRANULOCYTES NFR BLD AUTO: 0 % (ref 0–0.5)
INR PPP: 1.1 (ref 0.9–1.1)
LYMPHOCYTES # BLD: 1.7 K/UL (ref 0.9–3.6)
LYMPHOCYTES NFR BLD: 38 % (ref 21–52)
MAGNESIUM SERPL-MCNC: 1.8 MG/DL (ref 1.6–2.6)
MCH RBC QN AUTO: 29 PG (ref 24–34)
MCHC RBC AUTO-ENTMCNC: 31.5 G/DL (ref 31–37)
MCV RBC AUTO: 92.1 FL (ref 78–100)
MONOCYTES # BLD: 0.4 K/UL (ref 0.05–1.2)
MONOCYTES NFR BLD: 8 % (ref 3–10)
NEUTS SEG # BLD: 2.1 K/UL (ref 1.8–8)
NEUTS SEG NFR BLD: 48 % (ref 40–73)
NRBC # BLD: 0 K/UL (ref 0–0.01)
NRBC BLD-RTO: 0 PER 100 WBC
PHOSPHATE SERPL-MCNC: 3.9 MG/DL (ref 2.5–4.9)
PLATELET # BLD AUTO: 167 K/UL (ref 135–420)
PMV BLD AUTO: 11 FL (ref 9.2–11.8)
POTASSIUM SERPL-SCNC: 3.8 MMOL/L (ref 3.5–5.5)
PROTHROMBIN TIME: 14.4 SEC (ref 11.9–14.7)
RBC # BLD AUTO: 3.55 M/UL (ref 4.2–5.3)
SERVICE CMNT-IMP: NORMAL
SODIUM SERPL-SCNC: 138 MMOL/L (ref 136–145)
WBC # BLD AUTO: 4.5 K/UL (ref 4.6–13.2)

## 2024-05-25 PROCEDURE — 83735 ASSAY OF MAGNESIUM: CPT

## 2024-05-25 PROCEDURE — G0378 HOSPITAL OBSERVATION PER HR: HCPCS

## 2024-05-25 PROCEDURE — 2709999900 HC NON-CHARGEABLE SUPPLY: Performed by: UROLOGY

## 2024-05-25 PROCEDURE — 6360000002 HC RX W HCPCS: Performed by: HOSPITALIST

## 2024-05-25 PROCEDURE — C2617 STENT, NON-COR, TEM W/O DEL: HCPCS | Performed by: UROLOGY

## 2024-05-25 PROCEDURE — 99223 1ST HOSP IP/OBS HIGH 75: CPT | Performed by: HOSPITALIST

## 2024-05-25 PROCEDURE — 3700000000 HC ANESTHESIA ATTENDED CARE: Performed by: UROLOGY

## 2024-05-25 PROCEDURE — 96375 TX/PRO/DX INJ NEW DRUG ADDON: CPT

## 2024-05-25 PROCEDURE — 6370000000 HC RX 637 (ALT 250 FOR IP): Performed by: INTERNAL MEDICINE

## 2024-05-25 PROCEDURE — 6370000000 HC RX 637 (ALT 250 FOR IP): Performed by: HOSPITALIST

## 2024-05-25 PROCEDURE — BT1F1ZZ FLUOROSCOPY OF LEFT KIDNEY, URETER AND BLADDER USING LOW OSMOLAR CONTRAST: ICD-10-PCS | Performed by: UROLOGY

## 2024-05-25 PROCEDURE — 96376 TX/PRO/DX INJ SAME DRUG ADON: CPT

## 2024-05-25 PROCEDURE — C1769 GUIDE WIRE: HCPCS | Performed by: UROLOGY

## 2024-05-25 PROCEDURE — 74420 UROGRAPHY RTRGR +-KUB: CPT

## 2024-05-25 PROCEDURE — 84100 ASSAY OF PHOSPHORUS: CPT

## 2024-05-25 PROCEDURE — C1758 CATHETER, URETERAL: HCPCS | Performed by: UROLOGY

## 2024-05-25 PROCEDURE — 6360000004 HC RX CONTRAST MEDICATION: Performed by: UROLOGY

## 2024-05-25 PROCEDURE — 3700000001 HC ADD 15 MINUTES (ANESTHESIA): Performed by: UROLOGY

## 2024-05-25 PROCEDURE — 6360000002 HC RX W HCPCS: Performed by: NURSE ANESTHETIST, CERTIFIED REGISTERED

## 2024-05-25 PROCEDURE — 80048 BASIC METABOLIC PNL TOTAL CA: CPT

## 2024-05-25 PROCEDURE — 6370000000 HC RX 637 (ALT 250 FOR IP): Performed by: STUDENT IN AN ORGANIZED HEALTH CARE EDUCATION/TRAINING PROGRAM

## 2024-05-25 PROCEDURE — 94761 N-INVAS EAR/PLS OXIMETRY MLT: CPT

## 2024-05-25 PROCEDURE — 6360000002 HC RX W HCPCS: Performed by: STUDENT IN AN ORGANIZED HEALTH CARE EDUCATION/TRAINING PROGRAM

## 2024-05-25 PROCEDURE — 2580000003 HC RX 258: Performed by: HOSPITALIST

## 2024-05-25 PROCEDURE — 3600000002 HC SURGERY LEVEL 2 BASE: Performed by: UROLOGY

## 2024-05-25 PROCEDURE — 1100000000 HC RM PRIVATE

## 2024-05-25 PROCEDURE — 36415 COLL VENOUS BLD VENIPUNCTURE: CPT

## 2024-05-25 PROCEDURE — 85610 PROTHROMBIN TIME: CPT

## 2024-05-25 PROCEDURE — 7100000001 HC PACU RECOVERY - ADDTL 15 MIN: Performed by: UROLOGY

## 2024-05-25 PROCEDURE — 85025 COMPLETE CBC W/AUTO DIFF WBC: CPT

## 2024-05-25 PROCEDURE — 94640 AIRWAY INHALATION TREATMENT: CPT

## 2024-05-25 PROCEDURE — 2700000000 HC OXYGEN THERAPY PER DAY

## 2024-05-25 PROCEDURE — 3600000012 HC SURGERY LEVEL 2 ADDTL 15MIN: Performed by: UROLOGY

## 2024-05-25 PROCEDURE — 0T778DZ DILATION OF LEFT URETER WITH INTRALUMINAL DEVICE, VIA NATURAL OR ARTIFICIAL OPENING ENDOSCOPIC: ICD-10-PCS | Performed by: UROLOGY

## 2024-05-25 PROCEDURE — 94664 DEMO&/EVAL PT USE INHALER: CPT

## 2024-05-25 PROCEDURE — 7100000000 HC PACU RECOVERY - FIRST 15 MIN: Performed by: UROLOGY

## 2024-05-25 PROCEDURE — 2500000003 HC RX 250 WO HCPCS: Performed by: NURSE ANESTHETIST, CERTIFIED REGISTERED

## 2024-05-25 PROCEDURE — 2580000003 HC RX 258: Performed by: STUDENT IN AN ORGANIZED HEALTH CARE EDUCATION/TRAINING PROGRAM

## 2024-05-25 DEVICE — URETERAL STENT
Type: IMPLANTABLE DEVICE | Site: URETER | Status: FUNCTIONAL
Brand: PERCUFLEX™ PLUS

## 2024-05-25 RX ORDER — ZOLPIDEM TARTRATE 5 MG/1
5 TABLET ORAL NIGHTLY PRN
Status: DISCONTINUED | OUTPATIENT
Start: 2024-05-25 | End: 2024-05-27 | Stop reason: HOSPADM

## 2024-05-25 RX ORDER — ARFORMOTEROL TARTRATE 15 UG/2ML
15 SOLUTION RESPIRATORY (INHALATION)
Status: DISCONTINUED | OUTPATIENT
Start: 2024-05-25 | End: 2024-05-27 | Stop reason: HOSPADM

## 2024-05-25 RX ORDER — POTASSIUM CHLORIDE 7.45 MG/ML
10 INJECTION INTRAVENOUS PRN
Status: DISCONTINUED | OUTPATIENT
Start: 2024-05-25 | End: 2024-05-27 | Stop reason: HOSPADM

## 2024-05-25 RX ORDER — PHENYLEPHRINE HCL IN 0.9% NACL 1 MG/10 ML
SYRINGE (ML) INTRAVENOUS PRN
Status: DISCONTINUED | OUTPATIENT
Start: 2024-05-25 | End: 2024-05-25 | Stop reason: SDUPTHER

## 2024-05-25 RX ORDER — HYDROXYZINE HYDROCHLORIDE 10 MG/1
10 TABLET, FILM COATED ORAL 3 TIMES DAILY PRN
Status: DISCONTINUED | OUTPATIENT
Start: 2024-05-25 | End: 2024-05-25

## 2024-05-25 RX ORDER — FENTANYL CITRATE 50 UG/ML
25 INJECTION, SOLUTION INTRAMUSCULAR; INTRAVENOUS EVERY 5 MIN PRN
Status: DISCONTINUED | OUTPATIENT
Start: 2024-05-25 | End: 2024-05-25 | Stop reason: HOSPADM

## 2024-05-25 RX ORDER — SODIUM CHLORIDE, SODIUM LACTATE, POTASSIUM CHLORIDE, CALCIUM CHLORIDE 600; 310; 30; 20 MG/100ML; MG/100ML; MG/100ML; MG/100ML
INJECTION, SOLUTION INTRAVENOUS CONTINUOUS
Status: DISCONTINUED | OUTPATIENT
Start: 2024-05-25 | End: 2024-05-25 | Stop reason: HOSPADM

## 2024-05-25 RX ORDER — SODIUM CHLORIDE 9 MG/ML
INJECTION, SOLUTION INTRAVENOUS CONTINUOUS
Status: DISCONTINUED | OUTPATIENT
Start: 2024-05-25 | End: 2024-05-26

## 2024-05-25 RX ORDER — MAGNESIUM SULFATE HEPTAHYDRATE 500 MG/ML
INJECTION, SOLUTION INTRAMUSCULAR; INTRAVENOUS PRN
Status: DISCONTINUED | OUTPATIENT
Start: 2024-05-25 | End: 2024-05-25 | Stop reason: SDUPTHER

## 2024-05-25 RX ORDER — LEVETIRACETAM 500 MG/5ML
1000 INJECTION, SOLUTION, CONCENTRATE INTRAVENOUS EVERY 12 HOURS
Status: DISCONTINUED | OUTPATIENT
Start: 2024-05-25 | End: 2024-05-25

## 2024-05-25 RX ORDER — NALOXONE HYDROCHLORIDE 0.4 MG/ML
INJECTION, SOLUTION INTRAMUSCULAR; INTRAVENOUS; SUBCUTANEOUS PRN
Status: DISCONTINUED | OUTPATIENT
Start: 2024-05-25 | End: 2024-05-25 | Stop reason: HOSPADM

## 2024-05-25 RX ORDER — LIDOCAINE HYDROCHLORIDE 20 MG/ML
INJECTION, SOLUTION EPIDURAL; INFILTRATION; INTRACAUDAL; PERINEURAL PRN
Status: DISCONTINUED | OUTPATIENT
Start: 2024-05-25 | End: 2024-05-25 | Stop reason: SDUPTHER

## 2024-05-25 RX ORDER — ONDANSETRON 2 MG/ML
4 INJECTION INTRAMUSCULAR; INTRAVENOUS
Status: DISCONTINUED | OUTPATIENT
Start: 2024-05-25 | End: 2024-05-25 | Stop reason: HOSPADM

## 2024-05-25 RX ORDER — PROCHLORPERAZINE EDISYLATE 5 MG/ML
5 INJECTION INTRAMUSCULAR; INTRAVENOUS
Status: DISCONTINUED | OUTPATIENT
Start: 2024-05-25 | End: 2024-05-25 | Stop reason: HOSPADM

## 2024-05-25 RX ORDER — ONDANSETRON 2 MG/ML
INJECTION INTRAMUSCULAR; INTRAVENOUS PRN
Status: DISCONTINUED | OUTPATIENT
Start: 2024-05-25 | End: 2024-05-25 | Stop reason: SDUPTHER

## 2024-05-25 RX ORDER — TEMAZEPAM 15 MG/1
15 CAPSULE ORAL NIGHTLY PRN
Status: DISCONTINUED | OUTPATIENT
Start: 2024-05-25 | End: 2024-05-27 | Stop reason: HOSPADM

## 2024-05-25 RX ORDER — ATORVASTATIN CALCIUM 40 MG/1
40 TABLET, FILM COATED ORAL NIGHTLY
Status: DISCONTINUED | OUTPATIENT
Start: 2024-05-25 | End: 2024-05-27 | Stop reason: HOSPADM

## 2024-05-25 RX ORDER — ACETAMINOPHEN 325 MG/1
650 TABLET ORAL EVERY 6 HOURS PRN
Status: DISCONTINUED | OUTPATIENT
Start: 2024-05-25 | End: 2024-05-27 | Stop reason: HOSPADM

## 2024-05-25 RX ORDER — ALPRAZOLAM 0.5 MG/1
0.5 TABLET ORAL 3 TIMES DAILY PRN
Status: DISCONTINUED | OUTPATIENT
Start: 2024-05-25 | End: 2024-05-27 | Stop reason: HOSPADM

## 2024-05-25 RX ORDER — MIDAZOLAM HYDROCHLORIDE 1 MG/ML
INJECTION INTRAMUSCULAR; INTRAVENOUS PRN
Status: DISCONTINUED | OUTPATIENT
Start: 2024-05-25 | End: 2024-05-25 | Stop reason: SDUPTHER

## 2024-05-25 RX ORDER — HYDROXYZINE HYDROCHLORIDE 25 MG/1
25 TABLET, FILM COATED ORAL EVERY 8 HOURS PRN
Status: DISCONTINUED | OUTPATIENT
Start: 2024-05-25 | End: 2024-05-27 | Stop reason: HOSPADM

## 2024-05-25 RX ORDER — ALBUTEROL SULFATE 2.5 MG/3ML
2.5 SOLUTION RESPIRATORY (INHALATION) 4 TIMES DAILY PRN
Status: DISCONTINUED | OUTPATIENT
Start: 2024-05-25 | End: 2024-05-27 | Stop reason: HOSPADM

## 2024-05-25 RX ORDER — DEXAMETHASONE SODIUM PHOSPHATE 4 MG/ML
INJECTION, SOLUTION INTRA-ARTICULAR; INTRALESIONAL; INTRAMUSCULAR; INTRAVENOUS; SOFT TISSUE PRN
Status: DISCONTINUED | OUTPATIENT
Start: 2024-05-25 | End: 2024-05-25 | Stop reason: SDUPTHER

## 2024-05-25 RX ORDER — ONDANSETRON 2 MG/ML
4 INJECTION INTRAMUSCULAR; INTRAVENOUS EVERY 6 HOURS PRN
Status: DISCONTINUED | OUTPATIENT
Start: 2024-05-25 | End: 2024-05-27 | Stop reason: HOSPADM

## 2024-05-25 RX ORDER — PROPOFOL 10 MG/ML
INJECTION, EMULSION INTRAVENOUS PRN
Status: DISCONTINUED | OUTPATIENT
Start: 2024-05-25 | End: 2024-05-25 | Stop reason: SDUPTHER

## 2024-05-25 RX ORDER — SODIUM CHLORIDE 9 MG/ML
INJECTION, SOLUTION INTRAVENOUS PRN
Status: DISCONTINUED | OUTPATIENT
Start: 2024-05-25 | End: 2024-05-27 | Stop reason: HOSPADM

## 2024-05-25 RX ORDER — FENTANYL CITRATE 50 UG/ML
INJECTION, SOLUTION INTRAMUSCULAR; INTRAVENOUS PRN
Status: DISCONTINUED | OUTPATIENT
Start: 2024-05-25 | End: 2024-05-25 | Stop reason: SDUPTHER

## 2024-05-25 RX ORDER — IODIXANOL 320 MG/ML
INJECTION, SOLUTION INTRAVASCULAR PRN
Status: DISCONTINUED | OUTPATIENT
Start: 2024-05-25 | End: 2024-05-25 | Stop reason: ALTCHOICE

## 2024-05-25 RX ORDER — SODIUM CHLORIDE 0.9 % (FLUSH) 0.9 %
5-40 SYRINGE (ML) INJECTION EVERY 12 HOURS SCHEDULED
Status: DISCONTINUED | OUTPATIENT
Start: 2024-05-25 | End: 2024-05-27 | Stop reason: HOSPADM

## 2024-05-25 RX ORDER — BUPROPION HYDROCHLORIDE 300 MG/1
300 TABLET ORAL DAILY
Status: DISCONTINUED | OUTPATIENT
Start: 2024-05-25 | End: 2024-05-27 | Stop reason: HOSPADM

## 2024-05-25 RX ORDER — SODIUM CHLORIDE 0.9 % (FLUSH) 0.9 %
5-40 SYRINGE (ML) INJECTION PRN
Status: DISCONTINUED | OUTPATIENT
Start: 2024-05-25 | End: 2024-05-27 | Stop reason: HOSPADM

## 2024-05-25 RX ORDER — LEVETIRACETAM 500 MG/1
1000 TABLET ORAL 2 TIMES DAILY
Status: DISCONTINUED | OUTPATIENT
Start: 2024-05-25 | End: 2024-05-27 | Stop reason: HOSPADM

## 2024-05-25 RX ORDER — MAGNESIUM SULFATE IN WATER 40 MG/ML
2000 INJECTION, SOLUTION INTRAVENOUS PRN
Status: DISCONTINUED | OUTPATIENT
Start: 2024-05-25 | End: 2024-05-27 | Stop reason: HOSPADM

## 2024-05-25 RX ORDER — ONDANSETRON 4 MG/1
4 TABLET, ORALLY DISINTEGRATING ORAL EVERY 8 HOURS PRN
Status: DISCONTINUED | OUTPATIENT
Start: 2024-05-25 | End: 2024-05-27 | Stop reason: HOSPADM

## 2024-05-25 RX ORDER — POTASSIUM CHLORIDE 20 MEQ/1
40 TABLET, EXTENDED RELEASE ORAL PRN
Status: DISCONTINUED | OUTPATIENT
Start: 2024-05-25 | End: 2024-05-27 | Stop reason: HOSPADM

## 2024-05-25 RX ORDER — CEFAZOLIN SODIUM 1 G/3ML
INJECTION, POWDER, FOR SOLUTION INTRAMUSCULAR; INTRAVENOUS PRN
Status: DISCONTINUED | OUTPATIENT
Start: 2024-05-25 | End: 2024-05-25 | Stop reason: SDUPTHER

## 2024-05-25 RX ORDER — POLYETHYLENE GLYCOL 3350 17 G/17G
17 POWDER, FOR SOLUTION ORAL DAILY PRN
Status: DISCONTINUED | OUTPATIENT
Start: 2024-05-25 | End: 2024-05-27 | Stop reason: HOSPADM

## 2024-05-25 RX ORDER — HYDROMORPHONE HYDROCHLORIDE 1 MG/ML
1 INJECTION, SOLUTION INTRAMUSCULAR; INTRAVENOUS; SUBCUTANEOUS
Status: DISCONTINUED | OUTPATIENT
Start: 2024-05-25 | End: 2024-05-27 | Stop reason: HOSPADM

## 2024-05-25 RX ORDER — BUDESONIDE 0.5 MG/2ML
0.5 INHALANT ORAL
Status: DISCONTINUED | OUTPATIENT
Start: 2024-05-25 | End: 2024-05-27 | Stop reason: HOSPADM

## 2024-05-25 RX ORDER — BUDESONIDE AND FORMOTEROL FUMARATE DIHYDRATE 80; 4.5 UG/1; UG/1
2 AEROSOL RESPIRATORY (INHALATION)
Status: DISCONTINUED | OUTPATIENT
Start: 2024-05-25 | End: 2024-05-25

## 2024-05-25 RX ORDER — QUETIAPINE 300 MG/1
300 TABLET, FILM COATED, EXTENDED RELEASE ORAL NIGHTLY
Status: DISCONTINUED | OUTPATIENT
Start: 2024-05-25 | End: 2024-05-27 | Stop reason: HOSPADM

## 2024-05-25 RX ORDER — ACETAMINOPHEN 650 MG/1
650 SUPPOSITORY RECTAL EVERY 6 HOURS PRN
Status: DISCONTINUED | OUTPATIENT
Start: 2024-05-25 | End: 2024-05-27 | Stop reason: HOSPADM

## 2024-05-25 RX ADMIN — TEMAZEPAM 15 MG: 15 CAPSULE ORAL at 23:59

## 2024-05-25 RX ADMIN — HYDROMORPHONE HYDROCHLORIDE 0.5 MG: 1 INJECTION, SOLUTION INTRAMUSCULAR; INTRAVENOUS; SUBCUTANEOUS at 06:52

## 2024-05-25 RX ADMIN — LIDOCAINE HYDROCHLORIDE 100 MG: 20 INJECTION, SOLUTION EPIDURAL; INFILTRATION; INTRACAUDAL; PERINEURAL at 14:27

## 2024-05-25 RX ADMIN — MIDAZOLAM 2 MG: 1 INJECTION, SOLUTION INTRAMUSCULAR; INTRAVENOUS at 14:20

## 2024-05-25 RX ADMIN — Medication 200 MCG: at 14:30

## 2024-05-25 RX ADMIN — FENTANYL CITRATE 25 MCG: 50 INJECTION INTRAMUSCULAR; INTRAVENOUS at 14:36

## 2024-05-25 RX ADMIN — ONDANSETRON 4 MG: 2 INJECTION INTRAMUSCULAR; INTRAVENOUS at 01:07

## 2024-05-25 RX ADMIN — ALPRAZOLAM 0.5 MG: 0.5 TABLET ORAL at 21:17

## 2024-05-25 RX ADMIN — LEVETIRACETAM 1000 MG: 100 INJECTION, SOLUTION INTRAVENOUS at 01:07

## 2024-05-25 RX ADMIN — ZOLPIDEM TARTRATE 5 MG: 5 TABLET ORAL at 23:59

## 2024-05-25 RX ADMIN — BUPROPION HYDROCHLORIDE 300 MG: 300 TABLET, FILM COATED, EXTENDED RELEASE ORAL at 08:30

## 2024-05-25 RX ADMIN — CEFAZOLIN 2 G: 330 INJECTION, POWDER, FOR SOLUTION INTRAMUSCULAR; INTRAVENOUS at 14:38

## 2024-05-25 RX ADMIN — FENTANYL CITRATE 50 MCG: 50 INJECTION INTRAMUSCULAR; INTRAVENOUS at 15:02

## 2024-05-25 RX ADMIN — HYDROMORPHONE HYDROCHLORIDE 0.5 MG: 1 INJECTION, SOLUTION INTRAMUSCULAR; INTRAVENOUS; SUBCUTANEOUS at 01:06

## 2024-05-25 RX ADMIN — ONDANSETRON 4 MG: 2 INJECTION INTRAMUSCULAR; INTRAVENOUS at 21:17

## 2024-05-25 RX ADMIN — ONDANSETRON 4 MG: 2 INJECTION INTRAMUSCULAR; INTRAVENOUS at 06:52

## 2024-05-25 RX ADMIN — QUETIAPINE FUMARATE 300 MG: 300 TABLET, EXTENDED RELEASE ORAL at 23:59

## 2024-05-25 RX ADMIN — BUDESONIDE 500 MCG: 0.5 INHALANT RESPIRATORY (INHALATION) at 21:17

## 2024-05-25 RX ADMIN — ONDANSETRON 4 MG: 2 INJECTION INTRAMUSCULAR; INTRAVENOUS at 18:20

## 2024-05-25 RX ADMIN — ARFORMOTEROL TARTRATE 15 MCG: 15 SOLUTION RESPIRATORY (INHALATION) at 21:17

## 2024-05-25 RX ADMIN — HYDROMORPHONE HYDROCHLORIDE 0.5 MG: 1 INJECTION, SOLUTION INTRAMUSCULAR; INTRAVENOUS; SUBCUTANEOUS at 09:40

## 2024-05-25 RX ADMIN — Medication 200 MCG: at 14:45

## 2024-05-25 RX ADMIN — PROPOFOL 200 MG: 10 INJECTION, EMULSION INTRAVENOUS at 14:27

## 2024-05-25 RX ADMIN — HYDROMORPHONE HYDROCHLORIDE 0.5 MG: 1 INJECTION, SOLUTION INTRAMUSCULAR; INTRAVENOUS; SUBCUTANEOUS at 15:49

## 2024-05-25 RX ADMIN — HYDROMORPHONE HYDROCHLORIDE 0.5 MG: 1 INJECTION, SOLUTION INTRAMUSCULAR; INTRAVENOUS; SUBCUTANEOUS at 04:06

## 2024-05-25 RX ADMIN — ONDANSETRON 4 MG: 2 INJECTION INTRAMUSCULAR; INTRAVENOUS at 14:34

## 2024-05-25 RX ADMIN — DEXAMETHASONE SODIUM PHOSPHATE 4 MG: 4 INJECTION INTRA-ARTICULAR; INTRALESIONAL; INTRAMUSCULAR; INTRAVENOUS; SOFT TISSUE at 14:33

## 2024-05-25 RX ADMIN — ATORVASTATIN CALCIUM 40 MG: 40 TABLET, FILM COATED ORAL at 21:17

## 2024-05-25 RX ADMIN — FENTANYL CITRATE 25 MCG: 50 INJECTION INTRAMUSCULAR; INTRAVENOUS at 14:52

## 2024-05-25 RX ADMIN — HYDROXYZINE HYDROCHLORIDE 10 MG: 10 TABLET ORAL at 16:27

## 2024-05-25 RX ADMIN — HYDROXYZINE HYDROCHLORIDE 25 MG: 25 TABLET, FILM COATED ORAL at 21:17

## 2024-05-25 RX ADMIN — HYDROMORPHONE HYDROCHLORIDE 1 MG: 1 INJECTION, SOLUTION INTRAMUSCULAR; INTRAVENOUS; SUBCUTANEOUS at 21:16

## 2024-05-25 RX ADMIN — HYDROXYZINE HYDROCHLORIDE 10 MG: 10 TABLET ORAL at 01:09

## 2024-05-25 RX ADMIN — LEVETIRACETAM 1000 MG: 500 TABLET, FILM COATED ORAL at 21:16

## 2024-05-25 RX ADMIN — Medication 100 MCG: at 14:36

## 2024-05-25 RX ADMIN — SODIUM CHLORIDE, PRESERVATIVE FREE 10 ML: 5 INJECTION INTRAVENOUS at 22:46

## 2024-05-25 RX ADMIN — MAGNESIUM SULFATE HEPTAHYDRATE 1 G: 500 INJECTION, SOLUTION INTRAMUSCULAR; INTRAVENOUS at 14:34

## 2024-05-25 RX ADMIN — HYDROMORPHONE HYDROCHLORIDE 1 MG: 1 INJECTION, SOLUTION INTRAMUSCULAR; INTRAVENOUS; SUBCUTANEOUS at 18:11

## 2024-05-25 RX ADMIN — HYDROMORPHONE HYDROCHLORIDE 0.5 MG: 1 INJECTION, SOLUTION INTRAMUSCULAR; INTRAVENOUS; SUBCUTANEOUS at 16:03

## 2024-05-25 RX ADMIN — BUDESONIDE 500 MCG: 0.5 INHALANT RESPIRATORY (INHALATION) at 08:57

## 2024-05-25 RX ADMIN — SODIUM CHLORIDE: 9 INJECTION, SOLUTION INTRAVENOUS at 01:06

## 2024-05-25 RX ADMIN — ARFORMOTEROL TARTRATE 15 MCG: 15 SOLUTION RESPIRATORY (INHALATION) at 08:57

## 2024-05-25 ASSESSMENT — PAIN DESCRIPTION - PAIN TYPE
TYPE: SURGICAL PAIN

## 2024-05-25 ASSESSMENT — PAIN DESCRIPTION - ORIENTATION
ORIENTATION: ANTERIOR;RIGHT;POSTERIOR
ORIENTATION: LEFT;ANTERIOR;POSTERIOR
ORIENTATION: LEFT
ORIENTATION: LEFT
ORIENTATION: RIGHT
ORIENTATION: LEFT
ORIENTATION: LEFT;LOWER
ORIENTATION: LEFT
ORIENTATION: ANTERIOR;RIGHT
ORIENTATION: LEFT
ORIENTATION: ANTERIOR;RIGHT;POSTERIOR
ORIENTATION: ANTERIOR;RIGHT;POSTERIOR
ORIENTATION: LEFT;LOWER
ORIENTATION: LEFT;LOWER
ORIENTATION: ANTERIOR;RIGHT;POSTERIOR

## 2024-05-25 ASSESSMENT — PAIN SCALES - WONG BAKER
WONGBAKER_NUMERICALRESPONSE: HURTS A LITTLE BIT
WONGBAKER_NUMERICALRESPONSE: NO HURT
WONGBAKER_NUMERICALRESPONSE: 2;0
WONGBAKER_NUMERICALRESPONSE: NO HURT
WONGBAKER_NUMERICALRESPONSE: HURTS A LITTLE BIT
WONGBAKER_NUMERICALRESPONSE: NO HURT
WONGBAKER_NUMERICALRESPONSE: NO HURT
WONGBAKER_NUMERICALRESPONSE: HURTS A LITTLE BIT
WONGBAKER_NUMERICALRESPONSE: NO HURT

## 2024-05-25 ASSESSMENT — PAIN SCALES - GENERAL
PAINLEVEL_OUTOF10: 0
PAINLEVEL_OUTOF10: 6
PAINLEVEL_OUTOF10: 0
PAINLEVEL_OUTOF10: 7
PAINLEVEL_OUTOF10: 9
PAINLEVEL_OUTOF10: 0
PAINLEVEL_OUTOF10: 0
PAINLEVEL_OUTOF10: 8
PAINLEVEL_OUTOF10: 0
PAINLEVEL_OUTOF10: 9
PAINLEVEL_OUTOF10: 0
PAINLEVEL_OUTOF10: 7
PAINLEVEL_OUTOF10: 0
PAINLEVEL_OUTOF10: 7
PAINLEVEL_OUTOF10: 7
PAINLEVEL_OUTOF10: 8
PAINLEVEL_OUTOF10: 0
PAINLEVEL_OUTOF10: 7
PAINLEVEL_OUTOF10: 7
PAINLEVEL_OUTOF10: 10
PAINLEVEL_OUTOF10: 0
PAINLEVEL_OUTOF10: 0
PAINLEVEL_OUTOF10: 9
PAINLEVEL_OUTOF10: 0
PAINLEVEL_OUTOF10: 8
PAINLEVEL_OUTOF10: 8
PAINLEVEL_OUTOF10: 0
PAINLEVEL_OUTOF10: 8

## 2024-05-25 ASSESSMENT — PAIN DESCRIPTION - LOCATION
LOCATION: ABDOMEN;BACK
LOCATION: ABDOMEN
LOCATION: ABDOMEN;BACK
LOCATION: ABDOMEN
LOCATION: ABDOMEN;BACK
LOCATION: ABDOMEN
LOCATION: ABDOMEN
LOCATION: ABDOMEN;BACK
LOCATION: ABDOMEN
LOCATION: ABDOMEN;BACK

## 2024-05-25 ASSESSMENT — PAIN DESCRIPTION - DESCRIPTORS
DESCRIPTORS: DISCOMFORT
DESCRIPTORS: DISCOMFORT
DESCRIPTORS: SHARP
DESCRIPTORS: DISCOMFORT
DESCRIPTORS: DISCOMFORT;STABBING
DESCRIPTORS: DISCOMFORT;SHARP
DESCRIPTORS: SHARP
DESCRIPTORS: ACHING;SHARP
DESCRIPTORS: DISCOMFORT
DESCRIPTORS: DISCOMFORT
DESCRIPTORS: ACHING;SHARP
DESCRIPTORS: ACHING;DISCOMFORT
DESCRIPTORS: SHARP

## 2024-05-25 ASSESSMENT — PAIN DESCRIPTION - FREQUENCY
FREQUENCY: INTERMITTENT

## 2024-05-25 ASSESSMENT — PAIN - FUNCTIONAL ASSESSMENT
PAIN_FUNCTIONAL_ASSESSMENT: ACTIVITIES ARE NOT PREVENTED

## 2024-05-25 ASSESSMENT — PAIN DESCRIPTION - ONSET
ONSET: GRADUAL
ONSET: GRADUAL
ONSET: ON-GOING
ONSET: ON-GOING

## 2024-05-25 NOTE — ED NOTES
Hospitalist paged for potential admission for observation due to frequent pain medication administration

## 2024-05-25 NOTE — OP NOTE
curl ? Due to minimal hydro    The patient's bladder was drained via the cystoscope.  The patient was awakened and transferred to the recovery room in stable condition.    Check in AM but likely ok for dc tomorrow    Electronically signed by Junior Ochoa MD on 5/25/2024 at 2:54 PM

## 2024-05-25 NOTE — H&P
Depression.  Continue home medication Wellbutrin  9.  Multiple allergies noted  10.  Avoid chemical DVT prophylaxis pending possible OR.  11.  Full code.  Admit to telemetry.  I discussed the case with GOPAL Eli.          Time spent 72 minutes.        Shani Camacho MD  May 25, 2024

## 2024-05-25 NOTE — ED NOTES
TRANSFER - OUT REPORT:    Verbal report given to Mehnaz Merino RN on Garrison Buenrostro  being transferred to [facility] ED for ordered procedure (stent placement in the morning)    Report consisted of patient's Situation, Background, Assessment and   Recommendations(SBAR).     Information from the following report(s) ED Encounter Summary, ED SBAR, and MAR was reviewed with the receiving nurse.    [place] Fall Assessment:    Presents to emergency department  because of falls (Syncope, seizure, or loss of consciousness): No  Age > 70: No  Altered Mental Status, Intoxication with alcohol or substance confusion (Disorientation, impaired judgment, poor safety awaremess, or inability to follow instructions): No  Impaired Mobility: Ambulates or transfers with assistive devices or assistance; Unable to ambulate or transer.: No  Nursing Judgement: No          Lines:   Peripheral IV 05/24/24 Left;Dorsal Hand (Active)   Site Assessment Clean, dry & intact 05/24/24 1635       Peripheral IV 05/24/24 Left Hand (Active)   Site Assessment Clean, dry & intact;Dry 05/24/24 1642   Line Status Brisk blood return 05/24/24 1642   Line Care Connections checked and tightened 05/24/24 1642   Phlebitis Assessment No symptoms 05/24/24 1642   Infiltration Assessment 0 05/24/24 1642   Alcohol Cap Used No 05/24/24 1642   Dressing Status Clean, dry & intact;Dry 05/24/24 1642   Dressing Type Transparent 05/24/24 1642        Opportunity for questions and clarification was provided.      Patient transported with:  Monitor

## 2024-05-25 NOTE — ANESTHESIA POSTPROCEDURE EVALUATION
Department of Anesthesiology  Postprocedure Note    Patient: Garrison Buenrostro  MRN: [id number]  YOB: 1970  Date of evaluation: 5/25/2024    Procedure Summary       Date: 05/25/24 Room / Location: [facility] MAIN 08 / [facility] MAIN OR    Anesthesia Start: 1422 Anesthesia Stop: 1504    Procedure: CYSTOSCOPY ; LEFT RETROGRADE PYELOGRAM; LEFT DOUBLE J URETERAL STENT INSERTION (Left: Ureter) Diagnosis:       Kidney stone on left side      (Kidney stone on left side [N20.0])    Surgeons: Junior Ochoa MD Responsible Provider: Kian Ayon MD    Anesthesia Type: General ASA Status: 3            Anesthesia Type: General    Duncan Phase I: Duncan Score: 9    Duncan Phase II:      Anesthesia Post Evaluation    Patient location during evaluation: PACU  Patient participation: complete - patient participated  Level of consciousness: sleepy but conscious  Pain score: 6  Airway patency: patent  Nausea & Vomiting: no nausea and no vomiting  Cardiovascular status: tachycardic (Heart rate improved from  to low 90s with IV hydration)  Respiratory status: acceptable and nasal cannula (Not obstructed, reminded to cough and deep breath)  Hydration status: euvolemic  Multimodal analgesia pain management approach  Pain management: adequate and satisfactory to patient    No notable events documented.

## 2024-05-25 NOTE — CONSULTS
[unfilled]       Assessment:   5 x 3 mm left distal ureteral stone, no hydronephrosis  Nonobstructing left renal stones  Left flank pain   UA 5/24/24: moderate blood, trace LE, 0-3 wbc, +epithelial cells   Afebrile, mildly tachy, Normal WBC   sCr 0.7     H/o Right nephrectomy 2/2 kidney stones    Plan:    Appreciate overall management per primary  CT reviewed  Ucx pending  Pain management per ED  Flomax 0.4 mg given  Maintain N.p.o.    To OR for left ureteral JJ stent placement        Saida Cruz PA-C  [facility]       [phone]    I have independently seen and examined the aformentioned patient. I agree with the history, physical, assesment and plan as documented above. Futheremore, I have addended the history, physical, assesment and plan above as needed in order to reflect my additional findings.   Will plan cysto jj stent rb were reviewed   Junior LEONIDAS Robert MD      May 25, 2024 6:15 AM        Chief Complaint   Patient presents with    Flank Pain    Dysuria       HISTORY OF PRESENT ILLNESS:  Garrison Buenrostro is a 53 y.o. female who is seen in consultation as referred by GOPAL Luis for kidney stone with possible urinary infection.  Patient has a  history of right nephrectomy secondary to kidney stones, not followed by [facility].    Patient has a PMH as noted below who presents to the ED with complaint of left-sided flank pain and dysuria.  Denies fever/chills.  CT A/P shows a 5 x 3 mm distal left ureteral stone. Presently, not causing obstruction and additional nonobstructing left kidney stones.  UA with trace LE and epithelial cells.  Patient is afebrile with a normal WBC and creatinine 0.7.  Patient given Flomax 0.4 mg and transferred for urological evaluation.  Patient with left flank pain that is moderate. Voiding well otherwise, but does not frequency and urgency. Denies F/C/N/V.       Past Medical History:   Diagnosis Date    PTSD (post-traumatic stress disorder)

## 2024-05-25 NOTE — PERIOP NOTE
TRANSFER - IN REPORT:    Verbal report received from 5th floor rn on [facility]  being received from [phone]   for ordered procedure      Report consisted of patient's Situation, Background, Assessment and   Recommendations(SBAR).     Information from the following report(s) Nurse Handoff Report and Pre Procedure Checklist was reviewed with the receiving nurse.    Opportunity for questions and clarification was provided.      Assessment completed upon patient's arrival to unit and care assumed.

## 2024-05-25 NOTE — ED NOTES
Pt to be admitted to observation. Per MD patient to go to [facility] ED for transport and will be admitted from there as to not delay transport.  Transport team at bedside. [facility] ED update by charge.

## 2024-05-25 NOTE — ANESTHESIA PRE PROCEDURE
Department of Anesthesiology  Preprocedure Note       Name:  Garrison Buenrostro   Age:  53 y.o.  :  1970                                          MRN:  [id number]         Date:  2024      Surgeon: Surgeon(s):  Junior Ochoa MD    Procedure: Procedure(s):  CYSTOSCOPY ; LEFT RETROGRADE PYELOGRAM; LEFT DOUBLE J URETERAL STENT INSERTION    Medications prior to admission:   Prior to Admission medications    Medication Sig Start Date End Date Taking? Authorizing Provider   pregabalin (LYRICA) 100 MG capsule Take 1 capsule by mouth 2 times daily for 30 days. Max Daily Amount: 200 mg 3/21/24 4/20/24  Yasmeen Rothman MD   QUEtiapine (SEROQUEL XR) 300 MG extended release tablet  2/15/24   Kayla Donis MD   Nebulizers (VIOS AEROSOL DELIVERY SYSTEM) [facility] USE NEBULIZER MEDS AS DIRECTED FOR SHORTNESS OF BREATH. 24   Kayla Donis MD   albuterol sulfate HFA (PROVENTIL;VENTOLIN;PROAIR) 108 (90 Base) MCG/ACT inhaler Inhale 2 puffs into the lungs every 4 hours as needed for Wheezing 24   Yasmeen Rothman MD   albuterol (PROVENTIL) (5 MG/ML) 0.5% nebulizer solution Take 0.5 mLs by nebulization 4 times daily as needed for Wheezing 24   Yasmeen Rothman MD   levETIRAcetam (KEPPRA) 1000 MG tablet Take one tab twice a day 24   Yasmeen Rothman MD   albuterol (PROVENTIL) (2.5 MG/3ML) 0.083% nebulizer solution Take 3 mLs by nebulization every 4 hours as needed for Wheezing    Kayla Donis MD   atorvastatin (LIPITOR) 40 MG tablet Take 1 tablet by mouth nightly    Kayla Donis MD   celecoxib (CELEBREX) 100 MG capsule Take 1 capsule by mouth 2 times daily    Kayla Donis MD   Cholecalciferol 50 MCG (2000) TABS Take 1 tablet by mouth daily    Kayla Donis MD   clotrimazole-betamethasone (LOTRISONE) 1-0.05 % cream  23   Kayla Donis MD   hydrOXYzine HCl (ATARAX) 25 MG tablet Take 1 tablet by mouth every 8 hours as needed for

## 2024-05-25 NOTE — PERIOP NOTE
TRANSFER - OUT REPORT:    Verbal report given to Niki ESPARZA on Garrison Buenrostro  being transferred to [address] [place] [address] for routine post-op       Report consisted of patient's Situation, Background, Assessment and   Recommendations(SBAR).     Information from the following report(s) Nurse Handoff Report, Adult Overview, Surgery Report, and MAR was reviewed with the receiving nurse.           Lines:   Peripheral IV 05/25/24 Left Antecubital (Active)        Opportunity for questions and clarification was provided.      Patient transported with:  O2 @ 2lpm and Tech and belongings

## 2024-05-25 NOTE — PLAN OF CARE
Problem: Discharge Planning  Goal: Discharge to home or other facility with appropriate resources  Outcome: Progressing  Flowsheets (Taken 5/24/2024 2027 by Celena Castorena RN)  Discharge to home or other facility with appropriate resources: Identify barriers to discharge with patient and caregiver     Problem: Pain  Goal: Verbalizes/displays adequate comfort level or baseline comfort level  Outcome: Progressing

## 2024-05-26 ENCOUNTER — APPOINTMENT (OUTPATIENT)
Facility: HOSPITAL | Age: 54
DRG: 465 | End: 2024-05-26
Payer: MEDICAID

## 2024-05-26 PROBLEM — R10.84 GENERALIZED ABDOMINAL PAIN: Status: ACTIVE | Noted: 2024-05-26

## 2024-05-26 PROBLEM — F33.1 MODERATE EPISODE OF RECURRENT MAJOR DEPRESSIVE DISORDER (HCC): Status: ACTIVE | Noted: 2024-05-26

## 2024-05-26 PROBLEM — F41.9 ANXIETY: Status: ACTIVE | Noted: 2024-05-26

## 2024-05-26 LAB
ANION GAP SERPL CALC-SCNC: 7 MMOL/L (ref 3–18)
BUN SERPL-MCNC: 11 MG/DL (ref 7–18)
BUN/CREAT SERPL: 13 (ref 12–20)
CALCIUM SERPL-MCNC: 9.1 MG/DL (ref 8.5–10.1)
CHLORIDE SERPL-SCNC: 109 MMOL/L (ref 100–111)
CO2 SERPL-SCNC: 23 MMOL/L (ref 21–32)
CREAT SERPL-MCNC: 0.86 MG/DL (ref 0.6–1.3)
ERYTHROCYTE [DISTWIDTH] IN BLOOD BY AUTOMATED COUNT: 13.2 % (ref 11.6–14.5)
GLUCOSE SERPL-MCNC: 97 MG/DL (ref 74–99)
HCT VFR BLD AUTO: 32.4 % (ref 35–45)
HGB BLD-MCNC: 10.4 G/DL (ref 12–16)
MCH RBC QN AUTO: 29.5 PG (ref 24–34)
MCHC RBC AUTO-ENTMCNC: 32.1 G/DL (ref 31–37)
MCV RBC AUTO: 91.8 FL (ref 78–100)
NRBC # BLD: 0 K/UL (ref 0–0.01)
NRBC BLD-RTO: 0 PER 100 WBC
PLATELET # BLD AUTO: 182 K/UL (ref 135–420)
PMV BLD AUTO: 11.2 FL (ref 9.2–11.8)
POTASSIUM SERPL-SCNC: 3.9 MMOL/L (ref 3.5–5.5)
RBC # BLD AUTO: 3.53 M/UL (ref 4.2–5.3)
SODIUM SERPL-SCNC: 139 MMOL/L (ref 136–145)
WBC # BLD AUTO: 6.7 K/UL (ref 4.6–13.2)

## 2024-05-26 PROCEDURE — 6360000002 HC RX W HCPCS: Performed by: HOSPITALIST

## 2024-05-26 PROCEDURE — 6370000000 HC RX 637 (ALT 250 FOR IP): Performed by: STUDENT IN AN ORGANIZED HEALTH CARE EDUCATION/TRAINING PROGRAM

## 2024-05-26 PROCEDURE — 36410 VNPNXR 3YR/> PHY/QHP DX/THER: CPT

## 2024-05-26 PROCEDURE — 6360000002 HC RX W HCPCS: Performed by: STUDENT IN AN ORGANIZED HEALTH CARE EDUCATION/TRAINING PROGRAM

## 2024-05-26 PROCEDURE — G0378 HOSPITAL OBSERVATION PER HR: HCPCS

## 2024-05-26 PROCEDURE — 36415 COLL VENOUS BLD VENIPUNCTURE: CPT

## 2024-05-26 PROCEDURE — 2580000003 HC RX 258: Performed by: STUDENT IN AN ORGANIZED HEALTH CARE EDUCATION/TRAINING PROGRAM

## 2024-05-26 PROCEDURE — 6370000000 HC RX 637 (ALT 250 FOR IP): Performed by: PHYSICIAN ASSISTANT

## 2024-05-26 PROCEDURE — 85027 COMPLETE CBC AUTOMATED: CPT

## 2024-05-26 PROCEDURE — 96372 THER/PROPH/DIAG INJ SC/IM: CPT

## 2024-05-26 PROCEDURE — 80048 BASIC METABOLIC PNL TOTAL CA: CPT

## 2024-05-26 PROCEDURE — 96376 TX/PRO/DX INJ SAME DRUG ADON: CPT

## 2024-05-26 PROCEDURE — 6370000000 HC RX 637 (ALT 250 FOR IP): Performed by: INTERNAL MEDICINE

## 2024-05-26 PROCEDURE — 6370000000 HC RX 637 (ALT 250 FOR IP): Performed by: HOSPITALIST

## 2024-05-26 PROCEDURE — 1100000000 HC RM PRIVATE

## 2024-05-26 PROCEDURE — 99232 SBSQ HOSP IP/OBS MODERATE 35: CPT | Performed by: STUDENT IN AN ORGANIZED HEALTH CARE EDUCATION/TRAINING PROGRAM

## 2024-05-26 PROCEDURE — 74176 CT ABD & PELVIS W/O CONTRAST: CPT

## 2024-05-26 RX ORDER — PHENAZOPYRIDINE HYDROCHLORIDE 100 MG/1
200 TABLET, FILM COATED ORAL 3 TIMES DAILY PRN
Status: DISCONTINUED | OUTPATIENT
Start: 2024-05-26 | End: 2024-05-27 | Stop reason: HOSPADM

## 2024-05-26 RX ORDER — TAMSULOSIN HYDROCHLORIDE 0.4 MG/1
0.4 CAPSULE ORAL DAILY
Status: DISCONTINUED | OUTPATIENT
Start: 2024-05-26 | End: 2024-05-27 | Stop reason: HOSPADM

## 2024-05-26 RX ORDER — HYDROMORPHONE HYDROCHLORIDE 2 MG/1
2.5 TABLET ORAL
Status: DISCONTINUED | OUTPATIENT
Start: 2024-05-26 | End: 2024-05-26

## 2024-05-26 RX ORDER — PROCHLORPERAZINE MALEATE 10 MG
5 TABLET ORAL ONCE
Status: COMPLETED | OUTPATIENT
Start: 2024-05-26 | End: 2024-05-26

## 2024-05-26 RX ORDER — HYDROMORPHONE HYDROCHLORIDE 2 MG/1
4 TABLET ORAL
Status: DISCONTINUED | OUTPATIENT
Start: 2024-05-26 | End: 2024-05-27 | Stop reason: HOSPADM

## 2024-05-26 RX ORDER — TAMSULOSIN HYDROCHLORIDE 0.4 MG/1
0.4 CAPSULE ORAL DAILY
Qty: 30 CAPSULE | Refills: 0 | Status: ON HOLD | OUTPATIENT
Start: 2024-05-26 | End: 2024-06-25

## 2024-05-26 RX ORDER — HYDROMORPHONE HYDROCHLORIDE 2 MG/1
4 TABLET ORAL
Status: DISCONTINUED | OUTPATIENT
Start: 2024-05-26 | End: 2024-05-26

## 2024-05-26 RX ORDER — TROSPIUM CHLORIDE 20 MG/1
20 TABLET, FILM COATED ORAL 2 TIMES DAILY PRN
Status: DISCONTINUED | OUTPATIENT
Start: 2024-05-26 | End: 2024-05-27 | Stop reason: HOSPADM

## 2024-05-26 RX ORDER — LIDOCAINE 4 G/G
1 PATCH TOPICAL DAILY
Status: DISCONTINUED | OUTPATIENT
Start: 2024-05-26 | End: 2024-05-27 | Stop reason: HOSPADM

## 2024-05-26 RX ORDER — TROSPIUM CHLORIDE 20 MG/1
20 TABLET, FILM COATED ORAL 2 TIMES DAILY PRN
Qty: 60 TABLET | Refills: 0 | Status: ON HOLD | OUTPATIENT
Start: 2024-05-26 | End: 2024-06-25

## 2024-05-26 RX ORDER — ENOXAPARIN SODIUM 100 MG/ML
40 INJECTION SUBCUTANEOUS DAILY
Status: DISCONTINUED | OUTPATIENT
Start: 2024-05-26 | End: 2024-05-27 | Stop reason: HOSPADM

## 2024-05-26 RX ORDER — PHENAZOPYRIDINE HYDROCHLORIDE 200 MG/1
200 TABLET, FILM COATED ORAL 3 TIMES DAILY PRN
Qty: 42 TABLET | Refills: 0 | Status: ON HOLD | OUTPATIENT
Start: 2024-05-26 | End: 2024-06-09

## 2024-05-26 RX ADMIN — LEVETIRACETAM 1000 MG: 500 TABLET, FILM COATED ORAL at 09:27

## 2024-05-26 RX ADMIN — HYDROMORPHONE HYDROCHLORIDE 2.5 MG: 2 TABLET ORAL at 06:09

## 2024-05-26 RX ADMIN — ALPRAZOLAM 0.5 MG: 0.5 TABLET ORAL at 13:27

## 2024-05-26 RX ADMIN — SODIUM CHLORIDE, PRESERVATIVE FREE 10 ML: 5 INJECTION INTRAVENOUS at 21:43

## 2024-05-26 RX ADMIN — QUETIAPINE FUMARATE 300 MG: 300 TABLET, EXTENDED RELEASE ORAL at 21:43

## 2024-05-26 RX ADMIN — PHENAZOPYRIDINE 200 MG: 100 TABLET ORAL at 15:06

## 2024-05-26 RX ADMIN — PROCHLORPERAZINE MALEATE 5 MG: 10 TABLET ORAL at 00:40

## 2024-05-26 RX ADMIN — HYDROMORPHONE HYDROCHLORIDE 1 MG: 1 INJECTION, SOLUTION INTRAMUSCULAR; INTRAVENOUS; SUBCUTANEOUS at 21:44

## 2024-05-26 RX ADMIN — TAMSULOSIN HYDROCHLORIDE 0.4 MG: 0.4 CAPSULE ORAL at 14:19

## 2024-05-26 RX ADMIN — ALPRAZOLAM 0.5 MG: 0.5 TABLET ORAL at 21:43

## 2024-05-26 RX ADMIN — LEVETIRACETAM 1000 MG: 500 TABLET, FILM COATED ORAL at 21:43

## 2024-05-26 RX ADMIN — TEMAZEPAM 15 MG: 15 CAPSULE ORAL at 21:44

## 2024-05-26 RX ADMIN — HYDROMORPHONE HYDROCHLORIDE 0.5 MG: 1 INJECTION, SOLUTION INTRAMUSCULAR; INTRAVENOUS; SUBCUTANEOUS at 17:51

## 2024-05-26 RX ADMIN — HYDROMORPHONE HYDROCHLORIDE 2.5 MG: 2 TABLET ORAL at 03:17

## 2024-05-26 RX ADMIN — BUPROPION HYDROCHLORIDE 300 MG: 300 TABLET, FILM COATED, EXTENDED RELEASE ORAL at 09:28

## 2024-05-26 RX ADMIN — BUDESONIDE 500 MCG: 0.5 INHALANT RESPIRATORY (INHALATION) at 21:44

## 2024-05-26 RX ADMIN — HYDROXYZINE HYDROCHLORIDE 25 MG: 25 TABLET, FILM COATED ORAL at 21:44

## 2024-05-26 RX ADMIN — HYDROMORPHONE HYDROCHLORIDE 2.5 MG: 2 TABLET ORAL at 10:25

## 2024-05-26 RX ADMIN — ALPRAZOLAM 0.5 MG: 0.5 TABLET ORAL at 06:09

## 2024-05-26 RX ADMIN — HYDROXYZINE HYDROCHLORIDE 25 MG: 25 TABLET, FILM COATED ORAL at 06:09

## 2024-05-26 RX ADMIN — TROSPIUM CHLORIDE 20 MG: 20 TABLET, FILM COATED ORAL at 13:27

## 2024-05-26 RX ADMIN — HYDROMORPHONE HYDROCHLORIDE 4 MG: 2 TABLET ORAL at 13:27

## 2024-05-26 RX ADMIN — ZOLPIDEM TARTRATE 5 MG: 5 TABLET ORAL at 21:44

## 2024-05-26 RX ADMIN — HYDROMORPHONE HYDROCHLORIDE 1 MG: 1 INJECTION, SOLUTION INTRAMUSCULAR; INTRAVENOUS; SUBCUTANEOUS at 00:07

## 2024-05-26 RX ADMIN — ARFORMOTEROL TARTRATE 15 MCG: 15 SOLUTION RESPIRATORY (INHALATION) at 21:44

## 2024-05-26 RX ADMIN — ATORVASTATIN CALCIUM 40 MG: 40 TABLET, FILM COATED ORAL at 21:44

## 2024-05-26 RX ADMIN — ENOXAPARIN SODIUM 40 MG: 100 INJECTION SUBCUTANEOUS at 17:48

## 2024-05-26 ASSESSMENT — PAIN DESCRIPTION - ORIENTATION
ORIENTATION: LEFT;LOWER
ORIENTATION: LOWER;LEFT
ORIENTATION: LEFT
ORIENTATION: LEFT;LOWER
ORIENTATION: ANTERIOR
ORIENTATION: ANTERIOR
ORIENTATION: LEFT;LOWER

## 2024-05-26 ASSESSMENT — PAIN SCALES - GENERAL
PAINLEVEL_OUTOF10: 0
PAINLEVEL_OUTOF10: 10
PAINLEVEL_OUTOF10: 7
PAINLEVEL_OUTOF10: 7
PAINLEVEL_OUTOF10: 10
PAINLEVEL_OUTOF10: 10
PAINLEVEL_OUTOF10: 8
PAINLEVEL_OUTOF10: 0
PAINLEVEL_OUTOF10: 10
PAINLEVEL_OUTOF10: 0
PAINLEVEL_OUTOF10: 0
PAINLEVEL_OUTOF10: 8
PAINLEVEL_OUTOF10: 0
PAINLEVEL_OUTOF10: 0
PAINLEVEL_OUTOF10: 8
PAINLEVEL_OUTOF10: 0
PAINLEVEL_OUTOF10: 0

## 2024-05-26 ASSESSMENT — PAIN - FUNCTIONAL ASSESSMENT
PAIN_FUNCTIONAL_ASSESSMENT: ACTIVITIES ARE NOT PREVENTED

## 2024-05-26 ASSESSMENT — PAIN DESCRIPTION - FREQUENCY
FREQUENCY: INTERMITTENT

## 2024-05-26 ASSESSMENT — PAIN SCALES - WONG BAKER
WONGBAKER_NUMERICALRESPONSE: NO HURT
WONGBAKER_NUMERICALRESPONSE: HURTS A LITTLE BIT
WONGBAKER_NUMERICALRESPONSE: HURTS LITTLE MORE
WONGBAKER_NUMERICALRESPONSE: NO HURT
WONGBAKER_NUMERICALRESPONSE: NO HURT
WONGBAKER_NUMERICALRESPONSE: HURTS WORST
WONGBAKER_NUMERICALRESPONSE: NO HURT
WONGBAKER_NUMERICALRESPONSE: HURTS WORST
WONGBAKER_NUMERICALRESPONSE: NO HURT

## 2024-05-26 ASSESSMENT — PAIN DESCRIPTION - DESCRIPTORS
DESCRIPTORS: ACHING;DISCOMFORT
DESCRIPTORS: DISCOMFORT;ACHING
DESCRIPTORS: ACHING;DISCOMFORT
DESCRIPTORS: ACHING
DESCRIPTORS: SHARP;ACHING
DESCRIPTORS: ACHING;SHARP
DESCRIPTORS: ACHING;DISCOMFORT
DESCRIPTORS: CRAMPING;ACHING
DESCRIPTORS: CRAMPING;ACHING

## 2024-05-26 ASSESSMENT — PAIN DESCRIPTION - ONSET
ONSET: ON-GOING

## 2024-05-26 ASSESSMENT — PAIN DESCRIPTION - LOCATION
LOCATION: ABDOMEN
LOCATION: ABDOMEN;BACK
LOCATION: ABDOMEN
LOCATION: ABDOMEN;BACK

## 2024-05-26 ASSESSMENT — PAIN DESCRIPTION - PAIN TYPE
TYPE: SURGICAL PAIN

## 2024-05-26 NOTE — PLAN OF CARE
Problem: Discharge Planning  Goal: Discharge to home or other facility with appropriate resources  Outcome: Progressing  Flowsheets (Taken 5/24/2024 2027 by Celena Castorena, RN)  Discharge to home or other facility with appropriate resources: Identify barriers to discharge with patient and caregiver     Problem: Pain  Goal: Verbalizes/displays adequate comfort level or baseline comfort level  Outcome: Progressing  Flowsheets (Taken 5/26/2024 1350)  Verbalizes/displays adequate comfort level or baseline comfort level: Encourage patient to monitor pain and request assistance     Problem: Safety - Adult  Goal: Free from fall injury  Outcome: Progressing  Flowsheets (Taken 5/26/2024 1350)  Free From Fall Injury: Instruct family/caregiver on patient safety

## 2024-05-27 VITALS
RESPIRATION RATE: 18 BRPM | HEART RATE: 101 BPM | DIASTOLIC BLOOD PRESSURE: 77 MMHG | SYSTOLIC BLOOD PRESSURE: 120 MMHG | HEIGHT: 64 IN | WEIGHT: 241.4 LBS | OXYGEN SATURATION: 94 % | TEMPERATURE: 97.6 F | BODY MASS INDEX: 41.21 KG/M2

## 2024-05-27 LAB
ANION GAP SERPL CALC-SCNC: 4 MMOL/L (ref 3–18)
BUN SERPL-MCNC: 12 MG/DL (ref 7–18)
BUN/CREAT SERPL: 14 (ref 12–20)
CALCIUM SERPL-MCNC: 8.2 MG/DL (ref 8.5–10.1)
CHLORIDE SERPL-SCNC: 109 MMOL/L (ref 100–111)
CO2 SERPL-SCNC: 24 MMOL/L (ref 21–32)
CREAT SERPL-MCNC: 0.84 MG/DL (ref 0.6–1.3)
ERYTHROCYTE [DISTWIDTH] IN BLOOD BY AUTOMATED COUNT: 13.3 % (ref 11.6–14.5)
GLUCOSE BLD STRIP.AUTO-MCNC: 122 MG/DL (ref 70–110)
GLUCOSE SERPL-MCNC: 93 MG/DL (ref 74–99)
HCT VFR BLD AUTO: 32.8 % (ref 35–45)
HGB BLD-MCNC: 10.3 G/DL (ref 12–16)
MCH RBC QN AUTO: 30 PG (ref 24–34)
MCHC RBC AUTO-ENTMCNC: 31.4 G/DL (ref 31–37)
MCV RBC AUTO: 95.6 FL (ref 78–100)
NRBC # BLD: 0 K/UL (ref 0–0.01)
NRBC BLD-RTO: 0 PER 100 WBC
PLATELET # BLD AUTO: 172 K/UL (ref 135–420)
PMV BLD AUTO: 11.3 FL (ref 9.2–11.8)
POTASSIUM SERPL-SCNC: 3.7 MMOL/L (ref 3.5–5.5)
RBC # BLD AUTO: 3.43 M/UL (ref 4.2–5.3)
SODIUM SERPL-SCNC: 137 MMOL/L (ref 136–145)
WBC # BLD AUTO: 6.6 K/UL (ref 4.6–13.2)

## 2024-05-27 PROCEDURE — 6370000000 HC RX 637 (ALT 250 FOR IP): Performed by: PHYSICIAN ASSISTANT

## 2024-05-27 PROCEDURE — 2580000003 HC RX 258: Performed by: STUDENT IN AN ORGANIZED HEALTH CARE EDUCATION/TRAINING PROGRAM

## 2024-05-27 PROCEDURE — 94761 N-INVAS EAR/PLS OXIMETRY MLT: CPT

## 2024-05-27 PROCEDURE — G0378 HOSPITAL OBSERVATION PER HR: HCPCS

## 2024-05-27 PROCEDURE — 6370000000 HC RX 637 (ALT 250 FOR IP): Performed by: HOSPITALIST

## 2024-05-27 PROCEDURE — 6360000002 HC RX W HCPCS: Performed by: HOSPITALIST

## 2024-05-27 PROCEDURE — 96376 TX/PRO/DX INJ SAME DRUG ADON: CPT

## 2024-05-27 PROCEDURE — 6360000002 HC RX W HCPCS: Performed by: STUDENT IN AN ORGANIZED HEALTH CARE EDUCATION/TRAINING PROGRAM

## 2024-05-27 PROCEDURE — 94640 AIRWAY INHALATION TREATMENT: CPT

## 2024-05-27 PROCEDURE — 99239 HOSP IP/OBS DSCHRG MGMT >30: CPT | Performed by: STUDENT IN AN ORGANIZED HEALTH CARE EDUCATION/TRAINING PROGRAM

## 2024-05-27 PROCEDURE — 85027 COMPLETE CBC AUTOMATED: CPT

## 2024-05-27 PROCEDURE — 82962 GLUCOSE BLOOD TEST: CPT

## 2024-05-27 PROCEDURE — 80048 BASIC METABOLIC PNL TOTAL CA: CPT

## 2024-05-27 PROCEDURE — 96372 THER/PROPH/DIAG INJ SC/IM: CPT

## 2024-05-27 PROCEDURE — 94664 DEMO&/EVAL PT USE INHALER: CPT

## 2024-05-27 PROCEDURE — 6370000000 HC RX 637 (ALT 250 FOR IP): Performed by: STUDENT IN AN ORGANIZED HEALTH CARE EDUCATION/TRAINING PROGRAM

## 2024-05-27 PROCEDURE — NBSRV NON-BILLABLE SERVICE: Performed by: STUDENT IN AN ORGANIZED HEALTH CARE EDUCATION/TRAINING PROGRAM

## 2024-05-27 PROCEDURE — 36415 COLL VENOUS BLD VENIPUNCTURE: CPT

## 2024-05-27 RX ORDER — ALBUTEROL SULFATE 90 UG/1
2 AEROSOL, METERED RESPIRATORY (INHALATION) 4 TIMES DAILY PRN
Qty: 18 G | Refills: 0 | Status: ON HOLD | OUTPATIENT
Start: 2024-05-27

## 2024-05-27 RX ORDER — ONDANSETRON 4 MG/1
4 TABLET, FILM COATED ORAL EVERY 8 HOURS PRN
Qty: 21 TABLET | Refills: 0 | Status: ON HOLD | OUTPATIENT
Start: 2024-05-27 | End: 2024-06-03

## 2024-05-27 RX ORDER — DIPHENHYDRAMINE HCL 25 MG
25 CAPSULE ORAL EVERY 6 HOURS PRN
Status: DISCONTINUED | OUTPATIENT
Start: 2024-05-27 | End: 2024-05-27 | Stop reason: HOSPADM

## 2024-05-27 RX ORDER — HYDROMORPHONE HYDROCHLORIDE 2 MG/1
4 TABLET ORAL EVERY 6 HOURS PRN
Qty: 12 TABLET | Refills: 0 | Status: SHIPPED | OUTPATIENT
Start: 2024-05-27 | End: 2024-05-30

## 2024-05-27 RX ORDER — HYDROXYZINE HYDROCHLORIDE 25 MG/1
25 TABLET, FILM COATED ORAL EVERY 8 HOURS PRN
Qty: 42 TABLET | Refills: 0 | Status: ON HOLD | OUTPATIENT
Start: 2024-05-27 | End: 2024-06-10

## 2024-05-27 RX ADMIN — BUDESONIDE 500 MCG: 0.5 INHALANT RESPIRATORY (INHALATION) at 09:14

## 2024-05-27 RX ADMIN — TAMSULOSIN HYDROCHLORIDE 0.4 MG: 0.4 CAPSULE ORAL at 08:33

## 2024-05-27 RX ADMIN — ARFORMOTEROL TARTRATE 15 MCG: 15 SOLUTION RESPIRATORY (INHALATION) at 09:14

## 2024-05-27 RX ADMIN — DIPHENHYDRAMINE HYDROCHLORIDE 25 MG: 25 CAPSULE ORAL at 13:42

## 2024-05-27 RX ADMIN — SODIUM CHLORIDE, PRESERVATIVE FREE 10 ML: 5 INJECTION INTRAVENOUS at 08:35

## 2024-05-27 RX ADMIN — ONDANSETRON 4 MG: 2 INJECTION INTRAMUSCULAR; INTRAVENOUS at 12:04

## 2024-05-27 RX ADMIN — BUPROPION HYDROCHLORIDE 300 MG: 300 TABLET, FILM COATED, EXTENDED RELEASE ORAL at 08:35

## 2024-05-27 RX ADMIN — ENOXAPARIN SODIUM 40 MG: 100 INJECTION SUBCUTANEOUS at 08:35

## 2024-05-27 RX ADMIN — LEVETIRACETAM 1000 MG: 500 TABLET, FILM COATED ORAL at 08:33

## 2024-05-27 RX ADMIN — HYDROMORPHONE HYDROCHLORIDE 4 MG: 2 TABLET ORAL at 16:57

## 2024-05-27 RX ADMIN — HYDROMORPHONE HYDROCHLORIDE 1 MG: 1 INJECTION, SOLUTION INTRAMUSCULAR; INTRAVENOUS; SUBCUTANEOUS at 06:31

## 2024-05-27 RX ADMIN — ONDANSETRON 4 MG: 2 INJECTION INTRAMUSCULAR; INTRAVENOUS at 00:03

## 2024-05-27 RX ADMIN — HYDROXYZINE HYDROCHLORIDE 25 MG: 25 TABLET, FILM COATED ORAL at 16:57

## 2024-05-27 RX ADMIN — ALPRAZOLAM 0.5 MG: 0.5 TABLET ORAL at 11:08

## 2024-05-27 RX ADMIN — PHENAZOPYRIDINE 200 MG: 100 TABLET ORAL at 00:03

## 2024-05-27 RX ADMIN — HYDROMORPHONE HYDROCHLORIDE 4 MG: 2 TABLET ORAL at 08:33

## 2024-05-27 RX ADMIN — ALPRAZOLAM 0.5 MG: 0.5 TABLET ORAL at 06:30

## 2024-05-27 RX ADMIN — HYDROMORPHONE HYDROCHLORIDE 1 MG: 1 INJECTION, SOLUTION INTRAMUSCULAR; INTRAVENOUS; SUBCUTANEOUS at 11:13

## 2024-05-27 RX ADMIN — TROSPIUM CHLORIDE 20 MG: 20 TABLET, FILM COATED ORAL at 11:08

## 2024-05-27 RX ADMIN — HYDROMORPHONE HYDROCHLORIDE 1 MG: 1 INJECTION, SOLUTION INTRAMUSCULAR; INTRAVENOUS; SUBCUTANEOUS at 03:17

## 2024-05-27 RX ADMIN — HYDROXYZINE HYDROCHLORIDE 25 MG: 25 TABLET, FILM COATED ORAL at 06:30

## 2024-05-27 RX ADMIN — HYDROMORPHONE HYDROCHLORIDE 1 MG: 1 INJECTION, SOLUTION INTRAMUSCULAR; INTRAVENOUS; SUBCUTANEOUS at 00:03

## 2024-05-27 ASSESSMENT — PAIN DESCRIPTION - ORIENTATION
ORIENTATION: LOWER;LEFT
ORIENTATION: LOWER;LEFT
ORIENTATION: RIGHT;LOWER
ORIENTATION: LEFT;RIGHT
ORIENTATION: LEFT;LOWER

## 2024-05-27 ASSESSMENT — PAIN SCALES - GENERAL
PAINLEVEL_OUTOF10: 0
PAINLEVEL_OUTOF10: 10
PAINLEVEL_OUTOF10: 0
PAINLEVEL_OUTOF10: 6
PAINLEVEL_OUTOF10: 7
PAINLEVEL_OUTOF10: 0
PAINLEVEL_OUTOF10: 4
PAINLEVEL_OUTOF10: 0
PAINLEVEL_OUTOF10: 7
PAINLEVEL_OUTOF10: 7
PAINLEVEL_OUTOF10: 0
PAINLEVEL_OUTOF10: 10
PAINLEVEL_OUTOF10: 0
PAINLEVEL_OUTOF10: 8
PAINLEVEL_OUTOF10: 0

## 2024-05-27 ASSESSMENT — PAIN DESCRIPTION - LOCATION
LOCATION: ABDOMEN;GROIN
LOCATION: ABDOMEN
LOCATION: ABDOMEN;BACK
LOCATION: ABDOMEN

## 2024-05-27 ASSESSMENT — PAIN DESCRIPTION - DESCRIPTORS
DESCRIPTORS: ACHING;BURNING
DESCRIPTORS: SHARP;STABBING
DESCRIPTORS: SHARP

## 2024-05-27 ASSESSMENT — PAIN SCALES - WONG BAKER
WONGBAKER_NUMERICALRESPONSE: NO HURT
WONGBAKER_NUMERICALRESPONSE: HURTS EVEN MORE
WONGBAKER_NUMERICALRESPONSE: NO HURT
WONGBAKER_NUMERICALRESPONSE: NO HURT
WONGBAKER_NUMERICALRESPONSE: HURTS LITTLE MORE
WONGBAKER_NUMERICALRESPONSE: NO HURT

## 2024-05-27 ASSESSMENT — PAIN DESCRIPTION - PAIN TYPE
TYPE: SURGICAL PAIN;ACUTE PAIN
TYPE: SURGICAL PAIN

## 2024-05-27 ASSESSMENT — PAIN - FUNCTIONAL ASSESSMENT
PAIN_FUNCTIONAL_ASSESSMENT: ACTIVITIES ARE NOT PREVENTED

## 2024-05-27 ASSESSMENT — PAIN DESCRIPTION - ONSET
ONSET: GRADUAL
ONSET: ON-GOING

## 2024-05-27 ASSESSMENT — PAIN DESCRIPTION - FREQUENCY
FREQUENCY: INTERMITTENT
FREQUENCY: CONTINUOUS

## 2024-05-27 NOTE — CARE COORDINATION
Met with Patient at bedside. CM introduces self and explains role. Patient is alert and oriented x 4. Hipaa verified. Patient is with a discharge order.  Patient agrees to complete initial  assessment.    During assessment patient has an untouched clear liquid tray at bedside, inquired on why it's untouched. Patient states, I am am nauseated and have no appetite. I have asked for something for nausea, they are supposed to bring me something that will melt under my tongue.\" This RN inquired if she is to advance to solids before being discharged, she states she doesn't know, she can't even try liquids right now. Patient states her pain is 8/10 currently, to her left kidney and side, she is unable to lie on that side and it is difficult to sit up, inquired if she told her bed side nurse, she states she did and she was just given something for pain, but it is not helping. Patient also points out a PICC line that is still in her arm, advised that will be removed right before leaving the hospital. She also informs this CM that her family is in [place] for the Holiday weekend, she called them and they are on their way back, but they have her keys to her house. The family will be able to transport patient home with her keys in the morning . The above was reported in person to Bedside Nurse, Tyshawn and Perfect Serve Message to Dr. Oshea.

## 2024-05-27 NOTE — PLAN OF CARE
Problem: Discharge Planning  Goal: Discharge to home or other facility with appropriate resources  5/27/2024 0931 by Amna Swift RN  Outcome: Progressing  Flowsheets (Taken 5/27/2024 0833)  Discharge to home or other facility with appropriate resources: Identify barriers to discharge with patient and caregiver  5/27/2024 0404 by Celena Castorena RN  Outcome: Not Progressing     Problem: Discharge Planning  Goal: Discharge to home or other facility with appropriate resources  5/27/2024 0931 by Amna Swift RN  Outcome: Progressing  Flowsheets (Taken 5/27/2024 0833)  Discharge to home or other facility with appropriate resources: Identify barriers to discharge with patient and caregiver  5/27/2024 0404 by Celena Castorena RN  Outcome: Not Progressing

## 2024-05-27 NOTE — PLAN OF CARE
Problem: Discharge Planning  Goal: Discharge to home or other facility with appropriate resources  5/27/2024 1748 by Amna Swift RN  Outcome: Adequate for Discharge  5/27/2024 0931 by Amna Swift RN  Outcome: Progressing  Flowsheets (Taken 5/27/2024 0833)  Discharge to home or other facility with appropriate resources: Identify barriers to discharge with patient and caregiver  5/27/2024 0404 by Celena Castorena RN  Outcome: Not Progressing     Problem: Discharge Planning  Goal: Discharge to home or other facility with appropriate resources  5/27/2024 1748 by Amna Swift RN  Outcome: Adequate for Discharge  5/27/2024 0931 by Amna Swift RN  Outcome: Progressing  Flowsheets (Taken 5/27/2024 0833)  Discharge to home or other facility with appropriate resources: Identify barriers to discharge with patient and caregiver  5/27/2024 0404 by Celena Castorena RN  Outcome: Not Progressing

## 2024-05-27 NOTE — PROGRESS NOTES
10:24 PM : Pt care transferred to me from Janelle Juares PA-C  ,ED provider. History of patient complaint(s), available diagnostic reports and current treatment plan has been discussed thoroughly.   Bedside rounding on patient occured : No .  Intended disposition of patient : TBD  Pending diagnostics reports and/or labs (please list):   ED Course as of 05/24/24 2225   Fri May 24, 2024   1858 Hx of kidney stones, perimenopausal, s/p right nephrectomy in 2014. Acute left flank pain, 3 non-obstructive stones on CT in Dec 2023. CT A/p result pending. Currently on Amoxicillin for dental issue, UA equivocal, urine culture sent, normal Bun/Cr. Dc home if CT neg for obstructive stone [JM]   1940 Reassessed the patient, still complaining of significant pain.  Decided to try oxycodone instead of Dilaudid as this makes her quite itchy and to see if an oral medication will be sufficient.  Will consult urology due to the size of the stone as well as presence of only 1 kidney to discuss indication for transfer for ureteroscopy, stent placement versus stone removal [JM]   2001 Spoke with Ms. Arpita Eli, Urology, rec transfer to [facility] ED with plan for ureteral stent placement tomorrow morning.  N.p.o. after midnight, trial of Flomax 0.4 mg [JM]   2223 Spoke to Dr. Camacho, hospitalist who accept the patient for observation for pain management while waiting for ureteral stent placement sometime tomorrow.  Patient was to be transferred to the ER as transport is here for continued pain management and so the hospitalist can assess the patient [JM]      ED Course User Index  [JM] Liu Ashley DO Jorge Morales, DO  Emergency Physician  [employer]    
4 Eyes Skin Assessment     NAME:  Garrison Buenrostro  YOB: 1970  MEDICAL RECORD NUMBER:  [id number]    The patient is being assessed for  Shift Handoff    I agree that at least one RN has performed a thorough Head to Toe Skin Assessment on the patient. ALL assessment sites listed below have been assessed.      Areas assessed by both nurses:    Head, Face, Ears, Shoulders, Back, Chest, Arms, Elbows, Hands, Sacrum. Buttock, Coccyx, Ischium, and Legs. Feet and Heels        Does the Patient have a Wound? No noted wound(s)       Arun Prevention initiated by RN: Yes  Wound Care Orders initiated by RN: No    Pressure Injury (Stage 3,4, Unstageable, DTI, NWPT, and Complex wounds) if present, place Wound referral order by RN under : No    New Ostomies, if present place, Ostomy referral order under : No     Nurse 1 eSignature: Electronically signed by Celena Castorena RN on 5/26/24 at 7:42 AM EDT    **SHARE this note so that the co-signing nurse can place an eSignature**    Nurse 2 eSignature: {Esignature:[id number]}   
4 Eyes Skin Assessment     NAME:  Garrison Buenrostro  YOB: 1970  MEDICAL RECORD NUMBER:  [id number]    The patient is being assessed for  Shift Handoff    I agree that at least one RN has performed a thorough Head to Toe Skin Assessment on the patient. ALL assessment sites listed below have been assessed.      Areas assessed by both nurses:    Head, Face, Ears, Shoulders, Back, Chest, Arms, Elbows, Hands, Legs. Feet and Heels, and Under Medical Devices         Does the Patient have a Wound? No noted wound(s)       Arun Prevention initiated by RN: YES  Wound Care Orders initiated by RN: No    Pressure Injury (Stage 3,4, Unstageable, DTI, NWPT, and Complex wounds) if present, place Wound referral order by RN under : No    New Ostomies, if present place, Ostomy referral order under : No     Nurse 1 eSignature: Electronically signed by Niki Garcia RN on 5/26/24 at 6:44 PM EDT    **SHARE this note so that the co-signing nurse can place an eSignature**    Nurse 2 eSignature: {Esignature:[id number]}      
4 Eyes Skin Assessment     NAME:  Garrison Buenrostro  YOB: 1970  MEDICAL RECORD NUMBER:  [id number]    The patient is being assessed for  Shift Handoff    I agree that at least one RN has performed a thorough Head to Toe Skin Assessment on the patient. ALL assessment sites listed below have been assessed.      Areas assessed by both nurses:    Head, Face, Ears, Shoulders, Back, Chest, Arms, Elbows, Hands, Sacrum. Buttock, Coccyx, Ischium, Legs. Feet and Heels, and Under Medical Devices         Does the Patient have a Wound? No noted wound(s)       Arun Prevention initiated by RN: Yes  Wound Care Orders initiated by RN: NO    Pressure Injury (Stage 3,4, Unstageable, DTI, NWPT, and Complex wounds) if present, place Wound referral order by RN under : No    New Ostomies, if present place, Ostomy referral order under : No     Nurse 1 eSignature: Electronically signed by Niki Garcia RN on 5/25/24 at 7:32 PM EDT    **SHARE this note so that the co-signing nurse can place an eSignature**    Nurse 2 eSignature: {Esignature:[id number]}    
4 Eyes Skin Assessment     NAME:  Garrison Buenrostro  YOB: 1970  MEDICAL RECORD NUMBER:  [id number]    The patient is being assessed for  Shift Handoff    I agree that at least one RN has performed a thorough Head to Toe Skin Assessment on the patient. ALL assessment sites listed below have been assessed.      Areas assessed by both nurses:    Head, Face, Ears, Shoulders, Back, Chest, Arms, Elbows, Hands, Sacrum. Buttock, Coccyx, Ischium, and Legs. Feet and Heels        Does the Patient have a Wound? No noted wound(s)       Arun Prevention initiated by RN: Yes  Wound Care Orders initiated by RN: No    Pressure Injury (Stage 3,4, Unstageable, DTI, NWPT, and Complex wounds) if present, place Wound referral order by RN under : No    New Ostomies, if present place, Ostomy referral order under : No     Nurse 1 eSignature: Electronically signed by Celena Castorena RN on 5/27/24 at 7:42 AM EDT    **SHARE this note so that the co-signing nurse can place an eSignature**    Nurse 2 eSignature: {Esignature:[id number]}   
4 Eyes Skin Assessment     NAME:  Garrison Buenrostro  YOB: 1970  MEDICAL RECORD NUMBER:  [id number]    The patient is being assessed for  Shift Handoff    I agree that at least one RN has performed a thorough Head to Toe Skin Assessment on the patient. ALL assessment sites listed below have been assessed.      Areas assessed by both nurses:    Head, Face, Ears, Shoulders, Back, Chest, Arms, Elbows, Hands, Sacrum. Buttock, Coccyx, Ischium, Legs. Feet and Heels, and Under Medical Devices         Does the Patient have a Wound? No noted wound(s)       Arun Prevention initiated by RN: Yes  Wound Care Orders initiated by RN: NO    Pressure Injury (Stage 3,4, Unstageable, DTI, NWPT, and Complex wounds) if present, place Wound referral order by RN under : No    New Ostomies, if present place, Ostomy referral order under : No     Nurse 1 eSignature: Electronically signed by Niki Garcia RN on 5/25/24 at 7:32 PM EDT    **SHARE this note so that the co-signing nurse can place an eSignature**    Nurse 2 eSignature: Electronically signed by Celena Castorena RN on 5/25/24 at 7:46 PM EDT    
4 Eyes Skin Assessment     NAME:  Garrison Buenrostro  YOB: 1970  MEDICAL RECORD NUMBER:  [id number]    The patient is being assessed for  Shift Handoff    I agree that at least one RN has performed a thorough Head to Toe Skin Assessment on the patient. ALL assessment sites listed below have been assessed.      Areas assessed by both nurses:    Head, Face, Ears, Shoulders, Back, Chest, Arms, Elbows, Hands, Sacrum. Buttock, Coccyx, Ischium, and Legs. Feet and Heels        Does the Patient have a Wound? No noted wound(s)       Arun Prevention initiated by RN: Yes  Wound Care Orders initiated by RN: No    Pressure Injury (Stage 3,4, Unstageable, DTI, NWPT, and Complex wounds) if present, place Wound referral order by RN under : No    New Ostomies, if present place, Ostomy referral order under : No     Nurse 1 eSignature: Electronically signed by Celena Castorena RN on 5/25/24 at 7:25 AM EDT    **SHARE this note so that the co-signing nurse can place an eSignature**    Nurse 2 eSignature: Electronically signed by Niki Garcia RN on 5/25/24 at 7:36 AM EDT   
Awaiting post op orders from providers.  
Order US guided IV placed.  
Paged regarding patient's concern over her home meds not being prescribed.  The patient was requesting temazepam, Ambien, Seroquel.  Restarted Seroquel, Ambien and temazepam.  Ambien and temazepam doses were lowered as to not overly sedate the patient    Paged again regarding this patient at 2:20 AM in the morning.  Patient apparently pulled out her IV.  Ordered Dilaudid oral as needed and patient will need IV access in the morning.  
Pt has lost 3rd IV today, requesting ED staff for placement  
Pt is a&ox4. Vs are stable. Discharge paperwork given and went over with pt. All questions were answered. Pt has all of her belongings   
[facility] Hospitalist [facility]  Progress Note  Date:2024       Room:[facility]  Patient Name:Garrison Buenrostro     YOB: 1970     Age:53 y.o.        Subjective    Subjective   Review of Systems    Patient still has severe left-sided abdominal and flank pain.  Patient reports IV pain medication works.  Patient reports having hives when given Toradol.  Patient has leg pain from naproxen and ibuprofen.  Patient's family is out of town and patient does not have a key to her home.  She was also nauseous.    Objective         Vitals Last 24 Hours:  TEMPERATURE:  Temp  Av.9 °F (36.6 °C)  Min: 97.2 °F (36.2 °C)  Max: 98.3 °F (36.8 °C)  RESPIRATIONS RANGE: Resp  Av.9  Min: 16  Max: 20  PULSE OXIMETRY RANGE: SpO2  Av.2 %  Min: 91 %  Max: 95 %  PULSE RANGE: Pulse  Av.8  Min: 96  Max: 120  BLOOD PRESSURE RANGE: Systolic (24hrs), Av , Min:92 , Max:137     ; Diastolic (24hrs), Av, Min:60, Max:85      I/O (24Hr):    Intake/Output Summary (Last 24 hours) at 2024 1412  Last data filed at 2024 1500  Gross per 24 hour   Intake --   Output 300 ml   Net -300 ml       Objective:  General Appearance:  Comfortable (Anxious and tearful).    Vital signs: (most recent): Blood pressure 125/69, pulse 99, temperature 97.2 °F (36.2 °C), temperature source Oral, resp. rate 18, height 1.626 m (5' 4\"), weight 109.5 kg (241 lb 6.4 oz), SpO2 94 %.    Output: Producing urine.    HEENT: Normal HEENT exam.    Lungs:  Normal effort and normal respiratory rate.  Breath sounds clear to auscultation.    Heart: Normal rate.  Regular rhythm.    Abdomen: Abdomen is soft and flat.  Bowel sounds are normal.   There is generalized tenderness.     Extremities: Normal range of motion.    Pulses: Distal pulses are intact.    Neurological: Patient is alert and oriented to person, place and time.    Skin:  Warm and dry.          Labs/Imaging/Diagnostics    Labs:  CBC:  Recent Labs     
Urology Progress Note        Assessment/Plan:     Patient Active Problem List   Diagnosis    Lumbar radiculopathy    Migraine headache    Seizures (HCC)    Nephrolithiasis    Pulmonary nodule seen on imaging study    Ureteral colic     5 x 3 mm left distal ureteral stone, no hydronephrosis s/p cystoscopy L JJ stent placement  by Dr. Ochoa  Nonobstructing left renal stones  Left flank pain              UA 24: moderate blood, trace LE, 0-3 wbc, +epithelial cells              Afebrile, mildly tachy, Normal WBC              sCr 0.7                H/o Right nephrectomy 2/2 kidney stones     Plan:    Appreciate overall management per primary  Interval L JJ stent placement with Dr. Ochoa .  Ucx NG   Start Stent medications - Flomax, Pyridium, Trospium BID  Stone pathway message sent to Nellie Lau. Will need OP follow up for definitive stone management.   Can sign off.       Saida Cruz PA-C    [phone]    I, Jhoan Morse MD, have personally reviewed all vitals, labs, and pertinent imaging. I have edited the note to reflect my findings and otherwise agree with the history, physical exam findings, and medical decision making as stated above.     24  2:28 PM    Jhoan Morse MD  [address] [place] [address]  [phone]      Subjective:     Daily Progress Note: 2024 11:13 AM    Garrison Buenrostro is doing well. Tolerating stent but does note hematuria, bladder spasms, and dysuria. Denies F/C/N/V.     Objective:     /74   Pulse (!) 105   Temp 97.8 °F (36.6 °C) (Axillary)   Resp 17   Ht 1.626 m (5' 4\")   Wt 84.4 kg (186 lb)   SpO2 90%   BMI 31.93 kg/m²      Temp (24hrs), Av.9 °F (36.6 °C), Min:97.4 °F (36.3 °C), Max:98.4 °F (36.9 °C)      Intake and Output:   1901 -  0700  In: 560 [P.O.:250; I.V.:310]  Out: 697 [Urine:697]  No intake/output data recorded.    PHYSICAL EXAMINATION:   /74   Pulse (!) 105   Temp 97.8 °F (36.6 °C) 
Urology Progress Note        Assessment/Plan:     Patient Active Problem List   Diagnosis    Lumbar radiculopathy    Migraine headache    Seizures (HCC)    Nephrolithiasis    Pulmonary nodule seen on imaging study    Ureteral colic    Generalized abdominal pain    Anxiety    Moderate episode of recurrent major depressive disorder (HCC)     5 x 3 mm left distal ureteral stone, no hydronephrosis s/p cystoscopy L JJ stent placement  by Dr. Ochoa  Nonobstructing left renal stones  Left flank pain              UA 24: moderate blood, trace LE, 0-3 wbc, +epithelial cells              Afebrile, mildly tachy, Normal WBC              sCr 0.7                H/o Right nephrectomy 2/2 kidney stones     Plan:    Appreciate overall management per primary  Interval L JJ stent placement with Dr. Ochoa .  Ucx NG   Start Stent medications - Flomax, Pyridium, Trospium BID-continue at VT   Stone pathway message sent to Nellie Lau. Will need OP follow up for definitive stone management.   Sign off       Amelia Richard, [facility]    [phone]    I, Jhoan Morse MD, have personally reviewed all vitals, labs, and pertinent imaging. I have edited the note to reflect my findings and otherwise agree with the history, physical exam findings, and medical decision making as stated above.     24  10:47 AM    Jhoan Morse MD  [address] [place], [place] [id number] [phone]      Subjective:     Daily Progress Note: 2024 9:29 AM    Garrison Buenrostro is doing well. Tolerating stent but does note bladder spasms and urinary frequency. F/C/N/V.     Objective:     /71   Pulse 96   Temp 98.1 °F (36.7 °C) (Oral)   Resp 20   Ht 1.626 m (5' 4\")   Wt 109.5 kg (241 lb 6.4 oz)   SpO2 95%   BMI 41.44 kg/m²      Temp (24hrs), Av.1 °F (36.7 °C), Min:97.9 °F (36.6 °C), Max:98.3 °F (36.8 °C)      Intake and Output:   1901 -  0700  In: 250 [P.O.:240; I.V.:10]  Out: 780 [Urine:780]  No 
  Sp[facility]   [phone]  
--      PT/INR:  Recent Labs     05/25/24  0544   PROTIME 14.4   INR 1.1     APTT:No results for input(s): \"APTT\" in the last 72 hours.  LIVER PROFILE:  Recent Labs     05/24/24  1630   AST 44*   ALT 51   BILITOT 0.2   ALKPHOS 97       Imaging:  CT ABDOMEN PELVIS WO CONTRAST Additional Contrast? None    Result Date: 5/24/2024  1. There is a 5 x 3 mm distal left ureteral stone. Presently, not causing obstruction. 2. Additional nonobstructing left kidney stones. 3. Previous right nephrectomy.       Current Medications:  Current Facility-Administered Medications: lidocaine 4 % external patch 1 patch, 1 patch, TransDERmal, Daily  tamsulosin (FLOMAX) capsule 0.4 mg, 0.4 mg, Oral, Daily  trospium (SANCTURA) tablet 20 mg, 20 mg, Oral, BID PRN  phenazopyridine (PYRIDIUM) tablet 200 mg, 200 mg, Oral, TID PRN  HYDROmorphone (DILAUDID) tablet 4 mg, 4 mg, Oral, Q3H PRN  albuterol (PROVENTIL) (2.5 MG/3ML) 0.083% nebulizer solution 2.5 mg, 2.5 mg, Nebulization, 4x Daily PRN  atorvastatin (LIPITOR) tablet 40 mg, 40 mg, Oral, Nightly  buPROPion (WELLBUTRIN XL) extended release tablet 300 mg, 300 mg, Oral, Daily  sodium chloride flush 0.9 % injection 5-40 mL, 5-40 mL, IntraVENous, 2 times per day  sodium chloride flush 0.9 % injection 5-40 mL, 5-40 mL, IntraVENous, PRN  0.9 % sodium chloride infusion, , IntraVENous, PRN  potassium chloride (KLOR-CON M) extended release tablet 40 mEq, 40 mEq, Oral, PRN **OR** potassium bicarb-citric acid (EFFER-K) effervescent tablet 40 mEq, 40 mEq, Oral, PRN **OR** potassium chloride 10 mEq/100 mL IVPB (Peripheral Line), 10 mEq, IntraVENous, PRN  magnesium sulfate 2000 mg in 50 mL IVPB premix, 2,000 mg, IntraVENous, PRN  ondansetron (ZOFRAN-ODT) disintegrating tablet 4 mg, 4 mg, Oral, Q8H PRN **OR** ondansetron (ZOFRAN) injection 4 mg, 4 mg, IntraVENous, Q6H PRN  polyethylene glycol (GLYCOLAX) packet 17 g, 17 g, Oral, Daily PRN  acetaminophen (TYLENOL) tablet 650 mg, 650 mg, Oral, Q6H PRN **OR**

## 2024-05-27 NOTE — CARE COORDINATION
Met with Patient at bedside. CM introduces self and explains role. Patient is alert and oriented x 4. Hipaa verified. Patient agrees to complete initial  assessment.    Patient is medically ready with a discharge order.   DCP: Home with self care and supportive family, ( patient does not have keys to home, family is visiting in [place] ) Family will bring keys in am and transport patient home in their POV.      05/27/24 1157   Service Assessment   Patient Orientation Alert and Oriented;Person;Place;Situation;Self   Cognition Alert   History Provided By Patient   Primary Caregiver Self   Accompanied By/Relationship Self   Support Systems Children;/   Patient's Healthcare Decision Maker is: Legal Next of Kin   PCP Verified by CM Yes   Last Visit to PCP Within last 3 months   Prior Functional Level Other (see comment);Assistance with the following:;Housework;Shopping;Cooking  (Does not drive.)   Current Functional Level Assistance with the following:;Other (see comment);Housework;Cooking;Shopping  (Does not drive.)   Can patient return to prior living arrangement Yes   Ability to make needs known: Good   Family able to assist with home care needs: Yes   Would you like for me to discuss the discharge plan with any other family members/significant others, and if so, who? No   Financial Resources Medicaid   Community Resources Transportation   Social/Functional History   Lives With Other (comment)  (Children)   Type of Home House   Home Layout Multi-level;Performs ADL's on one level   Home Access Stairs to enter with rails   Entrance Stairs - Number of Steps 11   Entrance Stairs - Rails Right   Bathroom Shower/Tub Tub/Shower unit  (Shower stool)   Bathroom Toilet Standard   Bathroom Equipment Grab bars in shower;Grab bars around toilet   Bathroom Accessibility Accessible   Home Equipment Cane;Grab bars  (shower stool)   Receives Help From Family   ADL Assistance Independent   Homemaking

## 2024-05-28 ENCOUNTER — CLINICAL DOCUMENTATION (OUTPATIENT)
Age: 54
End: 2024-05-28

## 2024-05-28 NOTE — DISCHARGE SUMMARY
as possible for a   visit.    Specialty: Urology  Why: Office will call you to schedule an appointment for a stent exchange  Contact information:  [address] [place] [id number] [phone]                       Minutes spent on discharge: 40 minutes spent coordinating this discharge (review instructions/follow-up, prescriptions, preparing report for sign off)    Manny Oshea DO  5/27/2024 8:44 PM    Disclaimer: Sections of this note are dictated using utilizing voice recognition software. Minor typographical errors may be present. If questions arise, please do not hesitate to contact me or call our department.      Manny Oshea DO, ELISEO, MS  [facility]  Hospitalist

## 2024-05-28 NOTE — PROGRESS NOTES
PATIENT REQUIRES TCM OUTREACH    MRN: [id number]     PATIENT:  Garrison Buenrostro    ADMIT DATE:  5/24/24    DISCHARGE DATE:  5/27/24     ADMITTING DX: L ureteral stone, retrograde pyelogram w/ JJ stent placement    MEDICATION CHANGES:   Start: hydromorphone, Pyridium, Flomax, Sanctura  Stop:   Change: albuterol    SPECIALISTS:  Urology    HOME HEALTH/WOUND CARE/PT/OT:  none    SCHEDULED FOLLOW UP APPTS:    Future Appointments   Date Time Provider Department Center   6/3/2024  1:15 PM Berna Bustamante, [facility] BS AMB   9/23/2024 10:15 AM Get Tate DO [facility] BS AMB       *Please contact patient within 2 business days and document under .TCMOFFICE.  A scheduled Hospital Follow-up for patient within 7 days is preferred*

## 2024-05-31 ENCOUNTER — APPOINTMENT (OUTPATIENT)
Facility: HOSPITAL | Age: 54
DRG: 463 | End: 2024-05-31
Payer: MEDICAID

## 2024-05-31 ENCOUNTER — HOSPITAL ENCOUNTER (INPATIENT)
Facility: HOSPITAL | Age: 54
LOS: 3 days | Discharge: HOME | DRG: 463 | End: 2024-06-04
Attending: INTERNAL MEDICINE | Admitting: INTERNAL MEDICINE
Payer: MEDICAID

## 2024-05-31 DIAGNOSIS — J96.12 ACUTE HYPOXIC ON CHRONIC HYPERCAPNIC RESPIRATORY FAILURE (HCC): ICD-10-CM

## 2024-05-31 DIAGNOSIS — N12 PYELONEPHRITIS: ICD-10-CM

## 2024-05-31 DIAGNOSIS — N17.9 AKI (ACUTE KIDNEY INJURY) (HCC): ICD-10-CM

## 2024-05-31 DIAGNOSIS — J96.01 ACUTE RESPIRATORY FAILURE WITH HYPOXIA (HCC): ICD-10-CM

## 2024-05-31 DIAGNOSIS — R06.02 SHORTNESS OF BREATH: ICD-10-CM

## 2024-05-31 DIAGNOSIS — R10.9 FLANK PAIN: Primary | ICD-10-CM

## 2024-05-31 DIAGNOSIS — J96.01 ACUTE HYPOXIC ON CHRONIC HYPERCAPNIC RESPIRATORY FAILURE (HCC): ICD-10-CM

## 2024-05-31 DIAGNOSIS — M79.2 NEUROPATHIC PAIN: ICD-10-CM

## 2024-05-31 LAB
ALBUMIN SERPL-MCNC: 3.1 G/DL (ref 3.4–5)
ALBUMIN/GLOB SERPL: 0.7 (ref 0.8–1.7)
ALP SERPL-CCNC: 81 U/L (ref 45–117)
ALT SERPL-CCNC: 27 U/L (ref 13–56)
ANION GAP SERPL CALC-SCNC: 4 MMOL/L (ref 3–18)
ARTERIAL PATENCY WRIST A: POSITIVE
AST SERPL-CCNC: 42 U/L (ref 10–38)
BASE EXCESS BLD CALC-SCNC: 2.4 MMOL/L
BASOPHILS # BLD: 0 K/UL (ref 0–0.1)
BASOPHILS NFR BLD: 0 % (ref 0–2)
BDY SITE: ABNORMAL
BILIRUB SERPL-MCNC: 0.9 MG/DL (ref 0.2–1)
BUN SERPL-MCNC: 17 MG/DL (ref 7–18)
BUN/CREAT SERPL: 10 (ref 12–20)
CALCIUM SERPL-MCNC: 9.3 MG/DL (ref 8.5–10.1)
CHLORIDE SERPL-SCNC: 107 MMOL/L (ref 100–111)
CO2 SERPL-SCNC: 27 MMOL/L (ref 21–32)
CREAT SERPL-MCNC: 1.66 MG/DL (ref 0.6–1.3)
D DIMER PPP FEU-MCNC: 1.49 UG/ML(FEU)
DIFFERENTIAL METHOD BLD: ABNORMAL
EOSINOPHIL # BLD: 0.4 K/UL (ref 0–0.4)
EOSINOPHIL NFR BLD: 5 % (ref 0–5)
ERYTHROCYTE [DISTWIDTH] IN BLOOD BY AUTOMATED COUNT: 13.7 % (ref 11.6–14.5)
GAS FLOW.O2 O2 DELIVERY SYS: ABNORMAL
GLOBULIN SER CALC-MCNC: 4.3 G/DL (ref 2–4)
GLUCOSE SERPL-MCNC: 109 MG/DL (ref 74–99)
HCO3 BLD-SCNC: 28.9 MMOL/L (ref 22–26)
HCT VFR BLD AUTO: 34 % (ref 35–45)
HGB BLD-MCNC: 10.6 G/DL (ref 12–16)
IMM GRANULOCYTES # BLD AUTO: 0 K/UL (ref 0–0.04)
IMM GRANULOCYTES NFR BLD AUTO: 1 % (ref 0–0.5)
LACTATE SERPL-SCNC: 0.7 MMOL/L (ref 0.4–2)
LYMPHOCYTES # BLD: 2.4 K/UL (ref 0.9–3.6)
LYMPHOCYTES NFR BLD: 29 % (ref 21–52)
MCH RBC QN AUTO: 29.3 PG (ref 24–34)
MCHC RBC AUTO-ENTMCNC: 31.2 G/DL (ref 31–37)
MCV RBC AUTO: 93.9 FL (ref 78–100)
MONOCYTES # BLD: 0.7 K/UL (ref 0.05–1.2)
MONOCYTES NFR BLD: 9 % (ref 3–10)
NEUTS SEG # BLD: 4.8 K/UL (ref 1.8–8)
NEUTS SEG NFR BLD: 57 % (ref 40–73)
NRBC # BLD: 0 K/UL (ref 0–0.01)
NRBC BLD-RTO: 0 PER 100 WBC
PCO2 BLD: 52.3 MMHG (ref 35–45)
PH BLD: 7.35 (ref 7.35–7.45)
PLATELET # BLD AUTO: 262 K/UL (ref 135–420)
PMV BLD AUTO: 10.6 FL (ref 9.2–11.8)
PO2 BLD: 183 MMHG (ref 80–100)
POTASSIUM SERPL-SCNC: 4.4 MMOL/L (ref 3.5–5.5)
PROCALCITONIN SERPL-MCNC: 0.05 NG/ML
PROT SERPL-MCNC: 7.4 G/DL (ref 6.4–8.2)
RBC # BLD AUTO: 3.62 M/UL (ref 4.2–5.3)
SAO2 % BLD: 99.5 % (ref 92–97)
SERVICE CMNT-IMP: ABNORMAL
SODIUM SERPL-SCNC: 138 MMOL/L (ref 136–145)
SPECIMEN TYPE: ABNORMAL
TROPONIN I SERPL HS-MCNC: 4 NG/L (ref 0–54)
WBC # BLD AUTO: 8.4 K/UL (ref 4.6–13.2)

## 2024-05-31 PROCEDURE — 71275 CT ANGIOGRAPHY CHEST: CPT

## 2024-05-31 PROCEDURE — 96365 THER/PROPH/DIAG IV INF INIT: CPT

## 2024-05-31 PROCEDURE — 6360000002 HC RX W HCPCS

## 2024-05-31 PROCEDURE — 36600 WITHDRAWAL OF ARTERIAL BLOOD: CPT

## 2024-05-31 PROCEDURE — 2580000003 HC RX 258

## 2024-05-31 PROCEDURE — 71045 X-RAY EXAM CHEST 1 VIEW: CPT

## 2024-05-31 PROCEDURE — 82803 BLOOD GASES ANY COMBINATION: CPT

## 2024-05-31 PROCEDURE — 74177 CT ABD & PELVIS W/CONTRAST: CPT

## 2024-05-31 PROCEDURE — 85379 FIBRIN DEGRADATION QUANT: CPT

## 2024-05-31 PROCEDURE — 81001 URINALYSIS AUTO W/SCOPE: CPT

## 2024-05-31 PROCEDURE — 80053 COMPREHEN METABOLIC PANEL: CPT

## 2024-05-31 PROCEDURE — 83605 ASSAY OF LACTIC ACID: CPT

## 2024-05-31 PROCEDURE — 87040 BLOOD CULTURE FOR BACTERIA: CPT

## 2024-05-31 PROCEDURE — 99285 EMERGENCY DEPT VISIT HI MDM: CPT

## 2024-05-31 PROCEDURE — 84145 PROCALCITONIN (PCT): CPT

## 2024-05-31 PROCEDURE — 85025 COMPLETE CBC W/AUTO DIFF WBC: CPT

## 2024-05-31 PROCEDURE — 93005 ELECTROCARDIOGRAM TRACING: CPT

## 2024-05-31 PROCEDURE — 0202U NFCT DS 22 TRGT SARS-COV-2: CPT

## 2024-05-31 PROCEDURE — 84484 ASSAY OF TROPONIN QUANT: CPT

## 2024-05-31 RX ORDER — IODIXANOL 320 MG/ML
100 INJECTION, SOLUTION INTRAVASCULAR
Status: COMPLETED | OUTPATIENT
Start: 2024-05-31 | End: 2024-06-01

## 2024-05-31 RX ADMIN — PIPERACILLIN AND TAZOBACTAM 4500 MG: 4; .5 INJECTION, POWDER, FOR SOLUTION INTRAVENOUS; PARENTERAL at 23:31

## 2024-06-01 ENCOUNTER — ANESTHESIA (OUTPATIENT)
Facility: HOSPITAL | Age: 54
End: 2024-06-01
Payer: MEDICAID

## 2024-06-01 ENCOUNTER — APPOINTMENT (OUTPATIENT)
Facility: HOSPITAL | Age: 54
DRG: 463 | End: 2024-06-01
Attending: INTERNAL MEDICINE
Payer: MEDICAID

## 2024-06-01 ENCOUNTER — ANESTHESIA EVENT (OUTPATIENT)
Facility: HOSPITAL | Age: 54
End: 2024-06-01
Payer: MEDICAID

## 2024-06-01 ENCOUNTER — APPOINTMENT (OUTPATIENT)
Facility: HOSPITAL | Age: 54
DRG: 463 | End: 2024-06-01
Payer: MEDICAID

## 2024-06-01 PROBLEM — J96.12 ACUTE HYPOXIC ON CHRONIC HYPERCAPNIC RESPIRATORY FAILURE (HCC): Status: ACTIVE | Noted: 2024-06-01

## 2024-06-01 PROBLEM — J96.02 ACUTE RESPIRATORY FAILURE WITH HYPOXIA AND HYPERCAPNIA (HCC): Status: ACTIVE | Noted: 2024-06-01

## 2024-06-01 PROBLEM — J96.01 ACUTE RESPIRATORY FAILURE WITH HYPOXIA AND HYPERCAPNIA (HCC): Status: ACTIVE | Noted: 2024-06-01

## 2024-06-01 PROBLEM — J96.90 RESPIRATORY FAILURE (HCC): Status: ACTIVE | Noted: 2024-06-01

## 2024-06-01 PROBLEM — Z79.899 POLYPHARMACY: Status: ACTIVE | Noted: 2024-06-01

## 2024-06-01 PROBLEM — N17.9 ARF (ACUTE RENAL FAILURE) (HCC): Status: ACTIVE | Noted: 2024-06-01

## 2024-06-01 PROBLEM — Z96.0 URETERAL STENT PRESENT: Status: ACTIVE | Noted: 2024-06-01

## 2024-06-01 PROBLEM — Z90.5 SINGLE KIDNEY: Status: ACTIVE | Noted: 2024-06-01

## 2024-06-01 PROBLEM — N39.0 UTI (URINARY TRACT INFECTION): Status: ACTIVE | Noted: 2024-06-01

## 2024-06-01 PROBLEM — R10.12 LEFT UPPER QUADRANT ABDOMINAL PAIN: Status: ACTIVE | Noted: 2024-06-01

## 2024-06-01 PROBLEM — J96.01 ACUTE HYPOXIC ON CHRONIC HYPERCAPNIC RESPIRATORY FAILURE (HCC): Status: ACTIVE | Noted: 2024-06-01

## 2024-06-01 LAB
AMMONIA PLAS-SCNC: 16 UMOL/L (ref 11–32)
AMPHET UR QL SCN: NEGATIVE
AMPHET UR QL SCN: NEGATIVE
ANION GAP SERPL CALC-SCNC: 6 MMOL/L (ref 3–18)
APPEARANCE UR: CLEAR
B PERT DNA SPEC QL NAA+PROBE: NOT DETECTED
BACTERIA URNS QL MICRO: NEGATIVE /HPF
BARBITURATES UR QL SCN: NEGATIVE
BARBITURATES UR QL SCN: NEGATIVE
BASOPHILS # BLD: 0 K/UL (ref 0–0.1)
BASOPHILS NFR BLD: 0 % (ref 0–2)
BENZODIAZ UR QL: POSITIVE
BENZODIAZ UR QL: POSITIVE
BILIRUB UR QL: ABNORMAL
BORDETELLA PARAPERTUSSIS BY PCR: NOT DETECTED
BUN SERPL-MCNC: 19 MG/DL (ref 7–18)
BUN/CREAT SERPL: 8 (ref 12–20)
C PNEUM DNA SPEC QL NAA+PROBE: NOT DETECTED
CALCIUM SERPL-MCNC: 8.5 MG/DL (ref 8.5–10.1)
CANNABINOIDS UR QL SCN: NEGATIVE
CANNABINOIDS UR QL SCN: NEGATIVE
CHLORIDE SERPL-SCNC: 106 MMOL/L (ref 100–111)
CHLORIDE UR-SCNC: 28 MMOL/L (ref 55–125)
CO2 SERPL-SCNC: 25 MMOL/L (ref 21–32)
COCAINE UR QL SCN: NEGATIVE
COCAINE UR QL SCN: NEGATIVE
COLOR UR: ABNORMAL
CREAT SERPL-MCNC: 2.39 MG/DL (ref 0.6–1.3)
DIFFERENTIAL METHOD BLD: ABNORMAL
EOSINOPHIL # BLD: 0.3 K/UL (ref 0–0.4)
EOSINOPHIL NFR BLD: 4 % (ref 0–5)
EPITH CASTS URNS QL MICRO: NORMAL /LPF (ref 0–5)
ERYTHROCYTE [DISTWIDTH] IN BLOOD BY AUTOMATED COUNT: 14.2 % (ref 11.6–14.5)
FLUAV SUBTYP SPEC NAA+PROBE: NOT DETECTED
FLUBV RNA SPEC QL NAA+PROBE: NOT DETECTED
GLUCOSE SERPL-MCNC: 132 MG/DL (ref 74–99)
GLUCOSE UR STRIP.AUTO-MCNC: NEGATIVE MG/DL
HADV DNA SPEC QL NAA+PROBE: NOT DETECTED
HCG UR QL: NEGATIVE
HCOV 229E RNA SPEC QL NAA+PROBE: NOT DETECTED
HCOV HKU1 RNA SPEC QL NAA+PROBE: NOT DETECTED
HCOV NL63 RNA SPEC QL NAA+PROBE: NOT DETECTED
HCOV OC43 RNA SPEC QL NAA+PROBE: NOT DETECTED
HCT VFR BLD AUTO: 33.5 % (ref 35–45)
HGB BLD-MCNC: 10.1 G/DL (ref 12–16)
HGB UR QL STRIP: ABNORMAL
HMPV RNA SPEC QL NAA+PROBE: NOT DETECTED
HPIV1 RNA SPEC QL NAA+PROBE: NOT DETECTED
HPIV2 RNA SPEC QL NAA+PROBE: NOT DETECTED
HPIV3 RNA SPEC QL NAA+PROBE: NOT DETECTED
HPIV4 RNA SPEC QL NAA+PROBE: NOT DETECTED
IMM GRANULOCYTES # BLD AUTO: 0 K/UL (ref 0–0.04)
IMM GRANULOCYTES NFR BLD AUTO: 0 % (ref 0–0.5)
KETONES UR QL STRIP.AUTO: NEGATIVE MG/DL
LACTATE SERPL-SCNC: 1.1 MMOL/L (ref 0.4–2)
LEUKOCYTE ESTERASE UR QL STRIP.AUTO: ABNORMAL
LYMPHOCYTES # BLD: 1.8 K/UL (ref 0.9–3.6)
LYMPHOCYTES NFR BLD: 23 % (ref 21–52)
Lab: ABNORMAL
Lab: ABNORMAL
M PNEUMO DNA SPEC QL NAA+PROBE: NOT DETECTED
MAGNESIUM SERPL-MCNC: 1.5 MG/DL (ref 1.6–2.6)
MCH RBC QN AUTO: 29.1 PG (ref 24–34)
MCHC RBC AUTO-ENTMCNC: 30.1 G/DL (ref 31–37)
MCV RBC AUTO: 96.5 FL (ref 78–100)
METHADONE UR QL: NEGATIVE
METHADONE UR QL: NEGATIVE
MONOCYTES # BLD: 0.6 K/UL (ref 0.05–1.2)
MONOCYTES NFR BLD: 8 % (ref 3–10)
NEUTS SEG # BLD: 5 K/UL (ref 1.8–8)
NEUTS SEG NFR BLD: 64 % (ref 40–73)
NITRITE UR QL STRIP.AUTO: POSITIVE
NRBC # BLD: 0 K/UL (ref 0–0.01)
NRBC BLD-RTO: 0 PER 100 WBC
NT PRO BNP: 39 PG/ML (ref 0–900)
OPIATES UR QL: POSITIVE
OPIATES UR QL: POSITIVE
OSMOLALITY UR: 280 MOSM/KG H2O
PCP UR QL: NEGATIVE
PCP UR QL: NEGATIVE
PH UR STRIP: 6 (ref 5–8)
PLATELET # BLD AUTO: 245 K/UL (ref 135–420)
PMV BLD AUTO: 10.1 FL (ref 9.2–11.8)
POTASSIUM SERPL-SCNC: 3.9 MMOL/L (ref 3.5–5.5)
POTASSIUM UR-SCNC: 35 MMOL/L (ref 12–62)
PROT UR STRIP-MCNC: 30 MG/DL
RBC # BLD AUTO: 3.47 M/UL (ref 4.2–5.3)
RBC #/AREA URNS HPF: NORMAL /HPF (ref 0–5)
RSV RNA SPEC QL NAA+PROBE: NOT DETECTED
RV+EV RNA SPEC QL NAA+PROBE: NOT DETECTED
SARS-COV-2 RNA RESP QL NAA+PROBE: NOT DETECTED
SODIUM SERPL-SCNC: 137 MMOL/L (ref 136–145)
SODIUM UR-SCNC: 27 MMOL/L (ref 20–110)
SP GR UR REFRACTOMETRY: >1.03 (ref 1–1.03)
UROBILINOGEN UR QL STRIP.AUTO: 1 EU/DL (ref 0.2–1)
WBC # BLD AUTO: 7.8 K/UL (ref 4.6–13.2)
WBC URNS QL MICRO: NORMAL /HPF (ref 0–4)

## 2024-06-01 PROCEDURE — 6360000004 HC RX CONTRAST MEDICATION

## 2024-06-01 PROCEDURE — 99223 1ST HOSP IP/OBS HIGH 75: CPT | Performed by: INTERNAL MEDICINE

## 2024-06-01 PROCEDURE — 6360000002 HC RX W HCPCS: Performed by: INTERNAL MEDICINE

## 2024-06-01 PROCEDURE — 6360000004 HC RX CONTRAST MEDICATION: Performed by: UROLOGY

## 2024-06-01 PROCEDURE — 6370000000 HC RX 637 (ALT 250 FOR IP): Performed by: INTERNAL MEDICINE

## 2024-06-01 PROCEDURE — 6360000002 HC RX W HCPCS: Performed by: STUDENT IN AN ORGANIZED HEALTH CARE EDUCATION/TRAINING PROGRAM

## 2024-06-01 PROCEDURE — C1769 GUIDE WIRE: HCPCS | Performed by: UROLOGY

## 2024-06-01 PROCEDURE — 80048 BASIC METABOLIC PNL TOTAL CA: CPT

## 2024-06-01 PROCEDURE — 93970 EXTREMITY STUDY: CPT

## 2024-06-01 PROCEDURE — 6360000002 HC RX W HCPCS: Performed by: EMERGENCY MEDICINE

## 2024-06-01 PROCEDURE — 94761 N-INVAS EAR/PLS OXIMETRY MLT: CPT

## 2024-06-01 PROCEDURE — 2580000003 HC RX 258: Performed by: NURSE ANESTHETIST, CERTIFIED REGISTERED

## 2024-06-01 PROCEDURE — 2500000003 HC RX 250 WO HCPCS: Performed by: NURSE ANESTHETIST, CERTIFIED REGISTERED

## 2024-06-01 PROCEDURE — 7100000000 HC PACU RECOVERY - FIRST 15 MIN: Performed by: UROLOGY

## 2024-06-01 PROCEDURE — 3600000002 HC SURGERY LEVEL 2 BASE: Performed by: UROLOGY

## 2024-06-01 PROCEDURE — 2709999900 HC NON-CHARGEABLE SUPPLY: Performed by: UROLOGY

## 2024-06-01 PROCEDURE — 3600000012 HC SURGERY LEVEL 2 ADDTL 15MIN: Performed by: UROLOGY

## 2024-06-01 PROCEDURE — 94640 AIRWAY INHALATION TREATMENT: CPT

## 2024-06-01 PROCEDURE — 3700000000 HC ANESTHESIA ATTENDED CARE: Performed by: UROLOGY

## 2024-06-01 PROCEDURE — 83935 ASSAY OF URINE OSMOLALITY: CPT

## 2024-06-01 PROCEDURE — 83735 ASSAY OF MAGNESIUM: CPT

## 2024-06-01 PROCEDURE — 51798 US URINE CAPACITY MEASURE: CPT

## 2024-06-01 PROCEDURE — 6360000002 HC RX W HCPCS

## 2024-06-01 PROCEDURE — 2580000003 HC RX 258: Performed by: EMERGENCY MEDICINE

## 2024-06-01 PROCEDURE — 74420 UROGRAPHY RTRGR +-KUB: CPT

## 2024-06-01 PROCEDURE — 6360000002 HC RX W HCPCS: Performed by: NURSE ANESTHETIST, CERTIFIED REGISTERED

## 2024-06-01 PROCEDURE — 6370000000 HC RX 637 (ALT 250 FOR IP): Performed by: EMERGENCY MEDICINE

## 2024-06-01 PROCEDURE — 02HV33Z INSERTION OF INFUSION DEVICE INTO SUPERIOR VENA CAVA, PERCUTANEOUS APPROACH: ICD-10-PCS | Performed by: UROLOGY

## 2024-06-01 PROCEDURE — 83880 ASSAY OF NATRIURETIC PEPTIDE: CPT

## 2024-06-01 PROCEDURE — 87086 URINE CULTURE/COLONY COUNT: CPT

## 2024-06-01 PROCEDURE — 1100000003 HC PRIVATE W/ TELEMETRY

## 2024-06-01 PROCEDURE — 83605 ASSAY OF LACTIC ACID: CPT

## 2024-06-01 PROCEDURE — 2580000003 HC RX 258: Performed by: INTERNAL MEDICINE

## 2024-06-01 PROCEDURE — 36410 VNPNXR 3YR/> PHY/QHP DX/THER: CPT

## 2024-06-01 PROCEDURE — 82436 ASSAY OF URINE CHLORIDE: CPT

## 2024-06-01 PROCEDURE — 81025 URINE PREGNANCY TEST: CPT

## 2024-06-01 PROCEDURE — 96375 TX/PRO/DX INJ NEW DRUG ADDON: CPT

## 2024-06-01 PROCEDURE — 7100000001 HC PACU RECOVERY - ADDTL 15 MIN: Performed by: UROLOGY

## 2024-06-01 PROCEDURE — 82140 ASSAY OF AMMONIA: CPT

## 2024-06-01 PROCEDURE — C2617 STENT, NON-COR, TEM W/O DEL: HCPCS | Performed by: UROLOGY

## 2024-06-01 PROCEDURE — 84133 ASSAY OF URINE POTASSIUM: CPT

## 2024-06-01 PROCEDURE — 3700000001 HC ADD 15 MINUTES (ANESTHESIA): Performed by: UROLOGY

## 2024-06-01 PROCEDURE — 84300 ASSAY OF URINE SODIUM: CPT

## 2024-06-01 PROCEDURE — 36415 COLL VENOUS BLD VENIPUNCTURE: CPT

## 2024-06-01 PROCEDURE — 94664 DEMO&/EVAL PT USE INHALER: CPT

## 2024-06-01 PROCEDURE — 85025 COMPLETE CBC W/AUTO DIFF WBC: CPT

## 2024-06-01 PROCEDURE — 80307 DRUG TEST PRSMV CHEM ANLYZR: CPT

## 2024-06-01 PROCEDURE — 2700000000 HC OXYGEN THERAPY PER DAY

## 2024-06-01 DEVICE — URETERAL STENT
Type: IMPLANTABLE DEVICE | Site: URETER | Status: FUNCTIONAL
Brand: PERCUFLEX™ PLUS

## 2024-06-01 RX ORDER — POTASSIUM CHLORIDE 20 MEQ/1
40 TABLET, EXTENDED RELEASE ORAL PRN
Status: DISCONTINUED | OUTPATIENT
Start: 2024-06-01 | End: 2024-06-04 | Stop reason: HOSPADM

## 2024-06-01 RX ORDER — SODIUM CHLORIDE 0.9 % (FLUSH) 0.9 %
5-40 SYRINGE (ML) INJECTION EVERY 12 HOURS SCHEDULED
Status: DISCONTINUED | OUTPATIENT
Start: 2024-06-01 | End: 2024-06-01 | Stop reason: HOSPADM

## 2024-06-01 RX ORDER — ALPRAZOLAM 0.5 MG/1
1 TABLET ORAL 3 TIMES DAILY PRN
Status: DISCONTINUED | OUTPATIENT
Start: 2024-06-01 | End: 2024-06-04 | Stop reason: HOSPADM

## 2024-06-01 RX ORDER — SODIUM CHLORIDE, SODIUM LACTATE, POTASSIUM CHLORIDE, CALCIUM CHLORIDE 600; 310; 30; 20 MG/100ML; MG/100ML; MG/100ML; MG/100ML
INJECTION, SOLUTION INTRAVENOUS CONTINUOUS
Status: DISCONTINUED | OUTPATIENT
Start: 2024-06-01 | End: 2024-06-02

## 2024-06-01 RX ORDER — IODIXANOL 320 MG/ML
INJECTION, SOLUTION INTRAVASCULAR PRN
Status: DISCONTINUED | OUTPATIENT
Start: 2024-06-01 | End: 2024-06-01 | Stop reason: ALTCHOICE

## 2024-06-01 RX ORDER — PHENYLEPHRINE HCL IN 0.9% NACL 1 MG/10 ML
SYRINGE (ML) INTRAVENOUS PRN
Status: DISCONTINUED | OUTPATIENT
Start: 2024-06-01 | End: 2024-06-01 | Stop reason: SDUPTHER

## 2024-06-01 RX ORDER — DICYCLOMINE HYDROCHLORIDE 10 MG/1
20 CAPSULE ORAL 4 TIMES DAILY
Status: DISCONTINUED | OUTPATIENT
Start: 2024-06-01 | End: 2024-06-04 | Stop reason: HOSPADM

## 2024-06-01 RX ORDER — ONDANSETRON 4 MG/1
4 TABLET, ORALLY DISINTEGRATING ORAL EVERY 8 HOURS PRN
Status: DISCONTINUED | OUTPATIENT
Start: 2024-06-01 | End: 2024-06-04 | Stop reason: HOSPADM

## 2024-06-01 RX ORDER — MAGNESIUM SULFATE HEPTAHYDRATE 500 MG/ML
INJECTION, SOLUTION INTRAMUSCULAR; INTRAVENOUS PRN
Status: DISCONTINUED | OUTPATIENT
Start: 2024-06-01 | End: 2024-06-01 | Stop reason: SDUPTHER

## 2024-06-01 RX ORDER — MAGNESIUM SULFATE IN WATER 40 MG/ML
2000 INJECTION, SOLUTION INTRAVENOUS PRN
Status: DISCONTINUED | OUTPATIENT
Start: 2024-06-01 | End: 2024-06-04 | Stop reason: HOSPADM

## 2024-06-01 RX ORDER — TRAZODONE HYDROCHLORIDE 50 MG/1
100 TABLET ORAL NIGHTLY PRN
Status: DISCONTINUED | OUTPATIENT
Start: 2024-06-01 | End: 2024-06-04 | Stop reason: HOSPADM

## 2024-06-01 RX ORDER — ACETAMINOPHEN 650 MG/1
650 SUPPOSITORY RECTAL EVERY 6 HOURS PRN
Status: DISCONTINUED | OUTPATIENT
Start: 2024-06-01 | End: 2024-06-04 | Stop reason: HOSPADM

## 2024-06-01 RX ORDER — MIDAZOLAM HYDROCHLORIDE 1 MG/ML
INJECTION INTRAMUSCULAR; INTRAVENOUS PRN
Status: DISCONTINUED | OUTPATIENT
Start: 2024-06-01 | End: 2024-06-01 | Stop reason: SDUPTHER

## 2024-06-01 RX ORDER — HYDROXYZINE HYDROCHLORIDE 25 MG/1
25 TABLET, FILM COATED ORAL EVERY 8 HOURS PRN
Status: DISCONTINUED | OUTPATIENT
Start: 2024-06-01 | End: 2024-06-02

## 2024-06-01 RX ORDER — NALOXONE HYDROCHLORIDE 0.4 MG/ML
INJECTION, SOLUTION INTRAMUSCULAR; INTRAVENOUS; SUBCUTANEOUS PRN
Status: DISCONTINUED | OUTPATIENT
Start: 2024-06-01 | End: 2024-06-01 | Stop reason: HOSPADM

## 2024-06-01 RX ORDER — SODIUM CHLORIDE 0.9 % (FLUSH) 0.9 %
5-40 SYRINGE (ML) INJECTION PRN
Status: DISCONTINUED | OUTPATIENT
Start: 2024-06-01 | End: 2024-06-04 | Stop reason: HOSPADM

## 2024-06-01 RX ORDER — POTASSIUM CHLORIDE 7.45 MG/ML
10 INJECTION INTRAVENOUS PRN
Status: DISCONTINUED | OUTPATIENT
Start: 2024-06-01 | End: 2024-06-04 | Stop reason: HOSPADM

## 2024-06-01 RX ORDER — PROCHLORPERAZINE EDISYLATE 5 MG/ML
5 INJECTION INTRAMUSCULAR; INTRAVENOUS
Status: DISCONTINUED | OUTPATIENT
Start: 2024-06-01 | End: 2024-06-01 | Stop reason: HOSPADM

## 2024-06-01 RX ORDER — MAGNESIUM SULFATE 1 G/100ML
1000 INJECTION INTRAVENOUS ONCE
Status: COMPLETED | OUTPATIENT
Start: 2024-06-01 | End: 2024-06-01

## 2024-06-01 RX ORDER — GABAPENTIN 400 MG/1
800 CAPSULE ORAL 2 TIMES DAILY
Status: DISCONTINUED | OUTPATIENT
Start: 2024-06-01 | End: 2024-06-01

## 2024-06-01 RX ORDER — BUPROPION HYDROCHLORIDE 150 MG/1
300 TABLET ORAL DAILY
Status: DISCONTINUED | OUTPATIENT
Start: 2024-06-01 | End: 2024-06-04 | Stop reason: HOSPADM

## 2024-06-01 RX ORDER — ONDANSETRON 2 MG/ML
INJECTION INTRAMUSCULAR; INTRAVENOUS PRN
Status: DISCONTINUED | OUTPATIENT
Start: 2024-06-01 | End: 2024-06-01 | Stop reason: SDUPTHER

## 2024-06-01 RX ORDER — GABAPENTIN 100 MG/1
200 CAPSULE ORAL 2 TIMES DAILY
Status: DISCONTINUED | OUTPATIENT
Start: 2024-06-01 | End: 2024-06-04 | Stop reason: HOSPADM

## 2024-06-01 RX ORDER — SODIUM CHLORIDE, SODIUM LACTATE, POTASSIUM CHLORIDE, CALCIUM CHLORIDE 600; 310; 30; 20 MG/100ML; MG/100ML; MG/100ML; MG/100ML
INJECTION, SOLUTION INTRAVENOUS CONTINUOUS
Status: DISCONTINUED | OUTPATIENT
Start: 2024-06-01 | End: 2024-06-01 | Stop reason: HOSPADM

## 2024-06-01 RX ORDER — SUMATRIPTAN 25 MG/1
50 TABLET, FILM COATED ORAL ONCE
Status: COMPLETED | OUTPATIENT
Start: 2024-06-01 | End: 2024-06-01

## 2024-06-01 RX ORDER — LIDOCAINE HYDROCHLORIDE 10 MG/ML
1 INJECTION, SOLUTION EPIDURAL; INFILTRATION; INTRACAUDAL; PERINEURAL
Status: DISCONTINUED | OUTPATIENT
Start: 2024-06-01 | End: 2024-06-01 | Stop reason: HOSPADM

## 2024-06-01 RX ORDER — PANTOPRAZOLE SODIUM 40 MG/1
40 TABLET, DELAYED RELEASE ORAL
Status: DISCONTINUED | OUTPATIENT
Start: 2024-06-01 | End: 2024-06-04 | Stop reason: HOSPADM

## 2024-06-01 RX ORDER — POLYETHYLENE GLYCOL 3350 17 G/17G
17 POWDER, FOR SOLUTION ORAL DAILY PRN
Status: DISCONTINUED | OUTPATIENT
Start: 2024-06-01 | End: 2024-06-04 | Stop reason: HOSPADM

## 2024-06-01 RX ORDER — ROPINIROLE 0.25 MG/1
0.5 TABLET, FILM COATED ORAL 2 TIMES DAILY
Status: DISCONTINUED | OUTPATIENT
Start: 2024-06-01 | End: 2024-06-04 | Stop reason: HOSPADM

## 2024-06-01 RX ORDER — FENTANYL CITRATE 50 UG/ML
50 INJECTION, SOLUTION INTRAMUSCULAR; INTRAVENOUS ONCE
Status: DISCONTINUED | OUTPATIENT
Start: 2024-06-01 | End: 2024-06-01

## 2024-06-01 RX ORDER — LEVETIRACETAM 500 MG/1
1000 TABLET ORAL 2 TIMES DAILY
Status: DISCONTINUED | OUTPATIENT
Start: 2024-06-01 | End: 2024-06-04 | Stop reason: HOSPADM

## 2024-06-01 RX ORDER — BUDESONIDE 0.5 MG/2ML
0.5 INHALANT ORAL
Status: DISCONTINUED | OUTPATIENT
Start: 2024-06-01 | End: 2024-06-01

## 2024-06-01 RX ORDER — FENTANYL CITRATE 50 UG/ML
25 INJECTION, SOLUTION INTRAMUSCULAR; INTRAVENOUS ONCE
Status: COMPLETED | OUTPATIENT
Start: 2024-06-01 | End: 2024-06-01

## 2024-06-01 RX ORDER — ENOXAPARIN SODIUM 100 MG/ML
30 INJECTION SUBCUTANEOUS 2 TIMES DAILY
Status: DISCONTINUED | OUTPATIENT
Start: 2024-06-01 | End: 2024-06-04 | Stop reason: HOSPADM

## 2024-06-01 RX ORDER — BUDESONIDE AND FORMOTEROL FUMARATE DIHYDRATE 80; 4.5 UG/1; UG/1
2 AEROSOL RESPIRATORY (INHALATION)
Status: DISCONTINUED | OUTPATIENT
Start: 2024-06-01 | End: 2024-06-01

## 2024-06-01 RX ORDER — SUCCINYLCHOLINE/SOD CL,ISO/PF 100 MG/5ML
SYRINGE (ML) INTRAVENOUS PRN
Status: DISCONTINUED | OUTPATIENT
Start: 2024-06-01 | End: 2024-06-01 | Stop reason: SDUPTHER

## 2024-06-01 RX ORDER — TAMSULOSIN HYDROCHLORIDE 0.4 MG/1
0.4 CAPSULE ORAL DAILY
Status: DISCONTINUED | OUTPATIENT
Start: 2024-06-01 | End: 2024-06-04 | Stop reason: HOSPADM

## 2024-06-01 RX ORDER — ARFORMOTEROL TARTRATE 15 UG/2ML
15 SOLUTION RESPIRATORY (INHALATION)
Status: DISCONTINUED | OUTPATIENT
Start: 2024-06-01 | End: 2024-06-04 | Stop reason: HOSPADM

## 2024-06-01 RX ORDER — SODIUM CHLORIDE 9 MG/ML
INJECTION, SOLUTION INTRAVENOUS PRN
Status: DISCONTINUED | OUTPATIENT
Start: 2024-06-01 | End: 2024-06-04 | Stop reason: HOSPADM

## 2024-06-01 RX ORDER — ONDANSETRON 2 MG/ML
4 INJECTION INTRAMUSCULAR; INTRAVENOUS EVERY 6 HOURS PRN
Status: DISCONTINUED | OUTPATIENT
Start: 2024-06-01 | End: 2024-06-04 | Stop reason: HOSPADM

## 2024-06-01 RX ORDER — LIDOCAINE HYDROCHLORIDE 20 MG/ML
INJECTION, SOLUTION EPIDURAL; INFILTRATION; INTRACAUDAL; PERINEURAL PRN
Status: DISCONTINUED | OUTPATIENT
Start: 2024-06-01 | End: 2024-06-01 | Stop reason: SDUPTHER

## 2024-06-01 RX ORDER — FENTANYL CITRATE 50 UG/ML
INJECTION, SOLUTION INTRAMUSCULAR; INTRAVENOUS PRN
Status: DISCONTINUED | OUTPATIENT
Start: 2024-06-01 | End: 2024-06-01 | Stop reason: SDUPTHER

## 2024-06-01 RX ORDER — QUETIAPINE 200 MG/1
200 TABLET, FILM COATED, EXTENDED RELEASE ORAL NIGHTLY
Status: DISCONTINUED | OUTPATIENT
Start: 2024-06-01 | End: 2024-06-04 | Stop reason: HOSPADM

## 2024-06-01 RX ORDER — BUDESONIDE 0.25 MG/2ML
0.25 INHALANT ORAL
Status: DISCONTINUED | OUTPATIENT
Start: 2024-06-01 | End: 2024-06-04 | Stop reason: HOSPADM

## 2024-06-01 RX ORDER — ACETAMINOPHEN 325 MG/1
650 TABLET ORAL EVERY 6 HOURS PRN
Status: DISCONTINUED | OUTPATIENT
Start: 2024-06-01 | End: 2024-06-04 | Stop reason: HOSPADM

## 2024-06-01 RX ORDER — POTASSIUM CITRATE 5 MEQ/1
10 TABLET, EXTENDED RELEASE ORAL DAILY
Status: DISCONTINUED | OUTPATIENT
Start: 2024-06-01 | End: 2024-06-02

## 2024-06-01 RX ORDER — PROPOFOL 10 MG/ML
INJECTION, EMULSION INTRAVENOUS PRN
Status: DISCONTINUED | OUTPATIENT
Start: 2024-06-01 | End: 2024-06-01 | Stop reason: SDUPTHER

## 2024-06-01 RX ORDER — FENTANYL CITRATE 50 UG/ML
25 INJECTION, SOLUTION INTRAMUSCULAR; INTRAVENOUS EVERY 5 MIN PRN
Status: DISCONTINUED | OUTPATIENT
Start: 2024-06-01 | End: 2024-06-01 | Stop reason: HOSPADM

## 2024-06-01 RX ORDER — TROSPIUM CHLORIDE 20 MG/1
20 TABLET, FILM COATED ORAL
Status: DISCONTINUED | OUTPATIENT
Start: 2024-06-01 | End: 2024-06-04 | Stop reason: HOSPADM

## 2024-06-01 RX ORDER — ONDANSETRON 2 MG/ML
4 INJECTION INTRAMUSCULAR; INTRAVENOUS
Status: DISCONTINUED | OUTPATIENT
Start: 2024-06-01 | End: 2024-06-01 | Stop reason: HOSPADM

## 2024-06-01 RX ORDER — SODIUM CHLORIDE 0.9 % (FLUSH) 0.9 %
5-40 SYRINGE (ML) INJECTION EVERY 12 HOURS SCHEDULED
Status: DISCONTINUED | OUTPATIENT
Start: 2024-06-01 | End: 2024-06-04 | Stop reason: HOSPADM

## 2024-06-01 RX ORDER — DEXAMETHASONE SODIUM PHOSPHATE 4 MG/ML
INJECTION, SOLUTION INTRA-ARTICULAR; INTRALESIONAL; INTRAMUSCULAR; INTRAVENOUS; SOFT TISSUE PRN
Status: DISCONTINUED | OUTPATIENT
Start: 2024-06-01 | End: 2024-06-01 | Stop reason: SDUPTHER

## 2024-06-01 RX ORDER — FENTANYL CITRATE 50 UG/ML
25 INJECTION, SOLUTION INTRAMUSCULAR; INTRAVENOUS PRN
Status: COMPLETED | OUTPATIENT
Start: 2024-06-01 | End: 2024-06-01

## 2024-06-01 RX ORDER — SODIUM CHLORIDE 9 MG/ML
INJECTION, SOLUTION INTRAVENOUS CONTINUOUS
Status: DISCONTINUED | OUTPATIENT
Start: 2024-06-01 | End: 2024-06-01

## 2024-06-01 RX ORDER — HYDROMORPHONE HYDROCHLORIDE 1 MG/ML
1 INJECTION, SOLUTION INTRAMUSCULAR; INTRAVENOUS; SUBCUTANEOUS EVERY 4 HOURS PRN
Status: DISCONTINUED | OUTPATIENT
Start: 2024-06-01 | End: 2024-06-01

## 2024-06-01 RX ORDER — DIPHENHYDRAMINE HYDROCHLORIDE 50 MG/ML
12.5 INJECTION INTRAMUSCULAR; INTRAVENOUS EVERY 6 HOURS PRN
Status: DISCONTINUED | OUTPATIENT
Start: 2024-06-01 | End: 2024-06-01 | Stop reason: HOSPADM

## 2024-06-01 RX ORDER — ATORVASTATIN CALCIUM 40 MG/1
40 TABLET, FILM COATED ORAL NIGHTLY
Status: DISCONTINUED | OUTPATIENT
Start: 2024-06-01 | End: 2024-06-04 | Stop reason: HOSPADM

## 2024-06-01 RX ADMIN — DIPHENHYDRAMINE HYDROCHLORIDE 12.5 MG: 50 INJECTION, SOLUTION INTRAMUSCULAR; INTRAVENOUS at 19:49

## 2024-06-01 RX ADMIN — CEFTRIAXONE 1000 MG: 1 INJECTION, POWDER, FOR SOLUTION INTRAMUSCULAR; INTRAVENOUS at 05:13

## 2024-06-01 RX ADMIN — ENOXAPARIN SODIUM 30 MG: 100 INJECTION SUBCUTANEOUS at 23:24

## 2024-06-01 RX ADMIN — MIDAZOLAM 2 MG: 1 INJECTION, SOLUTION INTRAMUSCULAR; INTRAVENOUS at 18:21

## 2024-06-01 RX ADMIN — SODIUM CHLORIDE, POTASSIUM CHLORIDE, SODIUM LACTATE AND CALCIUM CHLORIDE: 600; 310; 30; 20 INJECTION, SOLUTION INTRAVENOUS at 22:06

## 2024-06-01 RX ADMIN — Medication 100 MCG: at 18:50

## 2024-06-01 RX ADMIN — MAGNESIUM SULFATE HEPTAHYDRATE 1 G: 500 INJECTION, SOLUTION INTRAMUSCULAR; INTRAVENOUS at 18:46

## 2024-06-01 RX ADMIN — ARFORMOTEROL TARTRATE 15 MCG: 15 SOLUTION RESPIRATORY (INHALATION) at 09:15

## 2024-06-01 RX ADMIN — TAMSULOSIN HYDROCHLORIDE 0.4 MG: 0.4 CAPSULE ORAL at 09:48

## 2024-06-01 RX ADMIN — HYDROMORPHONE HYDROCHLORIDE 1 MG: 1 INJECTION, SOLUTION INTRAMUSCULAR; INTRAVENOUS; SUBCUTANEOUS at 14:05

## 2024-06-01 RX ADMIN — ONDANSETRON 4 MG: 2 INJECTION INTRAMUSCULAR; INTRAVENOUS at 18:46

## 2024-06-01 RX ADMIN — DEXAMETHASONE SODIUM PHOSPHATE 4 MG: 4 INJECTION, SOLUTION INTRAMUSCULAR; INTRAVENOUS at 18:42

## 2024-06-01 RX ADMIN — FENTANYL CITRATE 25 MCG: 50 INJECTION INTRAMUSCULAR; INTRAVENOUS at 00:56

## 2024-06-01 RX ADMIN — DICYCLOMINE HYDROCHLORIDE 20 MG: 10 CAPSULE ORAL at 09:47

## 2024-06-01 RX ADMIN — DICYCLOMINE HYDROCHLORIDE 20 MG: 10 CAPSULE ORAL at 23:24

## 2024-06-01 RX ADMIN — ONDANSETRON 4 MG: 4 TABLET, ORALLY DISINTEGRATING ORAL at 16:52

## 2024-06-01 RX ADMIN — FENTANYL CITRATE 50 MCG: 50 INJECTION INTRAMUSCULAR; INTRAVENOUS at 18:39

## 2024-06-01 RX ADMIN — BUPROPION HYDROCHLORIDE 300 MG: 150 TABLET, EXTENDED RELEASE ORAL at 09:48

## 2024-06-01 RX ADMIN — Medication 100 MCG: at 18:56

## 2024-06-01 RX ADMIN — IODIXANOL 80 ML: 320 INJECTION, SOLUTION INTRAVASCULAR at 00:10

## 2024-06-01 RX ADMIN — QUETIAPINE FUMARATE 200 MG: 200 TABLET, EXTENDED RELEASE ORAL at 23:28

## 2024-06-01 RX ADMIN — SODIUM CHLORIDE: 9 INJECTION, SOLUTION INTRAVENOUS at 05:12

## 2024-06-01 RX ADMIN — Medication 100 MCG: at 18:52

## 2024-06-01 RX ADMIN — SODIUM CHLORIDE, SODIUM LACTATE, POTASSIUM CHLORIDE, AND CALCIUM CHLORIDE: 600; 310; 30; 20 INJECTION, SOLUTION INTRAVENOUS at 18:20

## 2024-06-01 RX ADMIN — Medication 140 MG: at 18:32

## 2024-06-01 RX ADMIN — HYDROMORPHONE HYDROCHLORIDE 1 MG: 1 INJECTION, SOLUTION INTRAMUSCULAR; INTRAVENOUS; SUBCUTANEOUS at 05:57

## 2024-06-01 RX ADMIN — DICYCLOMINE HYDROCHLORIDE 20 MG: 10 CAPSULE ORAL at 16:52

## 2024-06-01 RX ADMIN — DICYCLOMINE HYDROCHLORIDE 20 MG: 10 CAPSULE ORAL at 12:28

## 2024-06-01 RX ADMIN — LEVETIRACETAM 1000 MG: 500 TABLET, FILM COATED ORAL at 23:24

## 2024-06-01 RX ADMIN — PIPERACILLIN AND TAZOBACTAM 4500 MG: 4; .5 INJECTION, POWDER, FOR SOLUTION INTRAVENOUS at 16:56

## 2024-06-01 RX ADMIN — LEVETIRACETAM 1000 MG: 500 TABLET, FILM COATED ORAL at 09:48

## 2024-06-01 RX ADMIN — MAGNESIUM SULFATE HEPTAHYDRATE 1000 MG: 40 INJECTION, SOLUTION INTRAVENOUS at 18:42

## 2024-06-01 RX ADMIN — LIDOCAINE HYDROCHLORIDE 60 MG: 20 INJECTION, SOLUTION EPIDURAL; INFILTRATION; INTRACAUDAL; PERINEURAL at 18:32

## 2024-06-01 RX ADMIN — MAGNESIUM SULFATE HEPTAHYDRATE 1000 MG: 1 INJECTION, SOLUTION INTRAVENOUS at 17:17

## 2024-06-01 RX ADMIN — SUMATRIPTAN SUCCINATE 50 MG: 25 TABLET ORAL at 16:52

## 2024-06-01 RX ADMIN — SODIUM CHLORIDE: 9 INJECTION, SOLUTION INTRAVENOUS at 18:19

## 2024-06-01 RX ADMIN — HYDROMORPHONE HYDROCHLORIDE 0.5 MG: 1 INJECTION, SOLUTION INTRAMUSCULAR; INTRAVENOUS; SUBCUTANEOUS at 19:52

## 2024-06-01 RX ADMIN — TROSPIUM CHLORIDE 20 MG: 20 TABLET, FILM COATED ORAL at 16:52

## 2024-06-01 RX ADMIN — BUDESONIDE 250 MCG: 0.25 INHALANT RESPIRATORY (INHALATION) at 09:15

## 2024-06-01 RX ADMIN — HYDROXYZINE HYDROCHLORIDE 25 MG: 25 TABLET, FILM COATED ORAL at 05:13

## 2024-06-01 RX ADMIN — ONDANSETRON 4 MG: 2 INJECTION INTRAMUSCULAR; INTRAVENOUS at 05:13

## 2024-06-01 RX ADMIN — PROPOFOL 150 MG: 10 INJECTION, EMULSION INTRAVENOUS at 18:32

## 2024-06-01 RX ADMIN — SERTRALINE 100 MG: 50 TABLET, FILM COATED ORAL at 09:48

## 2024-06-01 RX ADMIN — ALPRAZOLAM 1 MG: 0.5 TABLET ORAL at 12:28

## 2024-06-01 RX ADMIN — HYDROMORPHONE HYDROCHLORIDE 1 MG: 1 INJECTION, SOLUTION INTRAMUSCULAR; INTRAVENOUS; SUBCUTANEOUS at 09:52

## 2024-06-01 RX ADMIN — FENTANYL CITRATE 25 MCG: 50 INJECTION INTRAMUSCULAR; INTRAVENOUS at 21:59

## 2024-06-01 RX ADMIN — ROPINIROLE HYDROCHLORIDE 0.5 MG: 0.25 TABLET, FILM COATED ORAL at 23:28

## 2024-06-01 RX ADMIN — SODIUM CHLORIDE, PRESERVATIVE FREE 10 ML: 5 INJECTION INTRAVENOUS at 22:02

## 2024-06-01 RX ADMIN — SODIUM CHLORIDE, PRESERVATIVE FREE 10 ML: 5 INJECTION INTRAVENOUS at 09:50

## 2024-06-01 RX ADMIN — ENOXAPARIN SODIUM 30 MG: 100 INJECTION SUBCUTANEOUS at 09:50

## 2024-06-01 ASSESSMENT — PAIN DESCRIPTION - ONSET
ONSET: GRADUAL
ONSET: GRADUAL
ONSET: ON-GOING
ONSET: ON-GOING
ONSET: GRADUAL

## 2024-06-01 ASSESSMENT — PAIN DESCRIPTION - DIRECTION
RADIATING_TOWARDS: LOCALIZED
RADIATING_TOWARDS: NO

## 2024-06-01 ASSESSMENT — PAIN - FUNCTIONAL ASSESSMENT
PAIN_FUNCTIONAL_ASSESSMENT: ACTIVITIES ARE NOT PREVENTED
PAIN_FUNCTIONAL_ASSESSMENT: PREVENTS OR INTERFERES SOME ACTIVE ACTIVITIES AND ADLS

## 2024-06-01 ASSESSMENT — PAIN DESCRIPTION - DESCRIPTORS
DESCRIPTORS: PRESSURE
DESCRIPTORS: ACHING;SHARP
DESCRIPTORS: ACHING
DESCRIPTORS: SHARP
DESCRIPTORS: SHARP
DESCRIPTORS: PRESSURE;POUNDING
DESCRIPTORS: SHARP;ACHING
DESCRIPTORS: PRESSURE;POUNDING
DESCRIPTORS: SHARP

## 2024-06-01 ASSESSMENT — PAIN DESCRIPTION - ORIENTATION
ORIENTATION: LEFT
ORIENTATION: LEFT
ORIENTATION: ANTERIOR
ORIENTATION: LEFT;POSTERIOR;UPPER
ORIENTATION: LEFT;POSTERIOR
ORIENTATION: ANTERIOR
ORIENTATION: LEFT
ORIENTATION: ANTERIOR;LEFT
ORIENTATION: LEFT

## 2024-06-01 ASSESSMENT — PAIN SCALES - GENERAL
PAINLEVEL_OUTOF10: 6
PAINLEVEL_OUTOF10: 10
PAINLEVEL_OUTOF10: 5
PAINLEVEL_OUTOF10: 8
PAINLEVEL_OUTOF10: 8
PAINLEVEL_OUTOF10: 3
PAINLEVEL_OUTOF10: 8
PAINLEVEL_OUTOF10: 10
PAINLEVEL_OUTOF10: 7
PAINLEVEL_OUTOF10: 8

## 2024-06-01 ASSESSMENT — PAIN DESCRIPTION - PAIN TYPE
TYPE: ACUTE PAIN
TYPE: SURGICAL PAIN
TYPE: ACUTE PAIN

## 2024-06-01 ASSESSMENT — PAIN DESCRIPTION - LOCATION
LOCATION: HIP;FLANK
LOCATION: ABDOMEN
LOCATION: FLANK
LOCATION: HEAD;RIB CAGE
LOCATION: HEAD
LOCATION: HIP;FLANK
LOCATION: FLANK;OTHER (COMMENT)
LOCATION: FLANK
LOCATION: HEAD
LOCATION: FLANK;ABDOMEN;BACK

## 2024-06-01 ASSESSMENT — PAIN DESCRIPTION - FREQUENCY
FREQUENCY: CONTINUOUS
FREQUENCY: CONTINUOUS
FREQUENCY: INTERMITTENT

## 2024-06-01 NOTE — CONSULTS
ICD-10-CM    1. Flank pain  R10.9       2. ABUNDIO (acute kidney injury) (HCC)  N17.9       3. Acute respiratory failure with hypoxia (HCC)  J96.01       4. Acute hypoxic on chronic hypercapnic respiratory failure (HCC)  J96.01 Vascular duplex lower extremity venous bilateral    J96.12 Vascular duplex lower extremity venous bilateral      5. Shortness of breath  R06.02 Echo (TTE) complete (PRN contrast/bubble/strain/3D)     Echo (TTE) complete (PRN contrast/bubble/strain/3D)            ASSESSMENT:   Garrison Buenrostro is a 53 y.o. female admitted with worsening abdominal pain  ABUNDIO  History of left ureteral stone and left stent placement  H/o Right nephrectomy 2/2 kidney stones   Mild left hydronephrosis, new compared to prior CT after stent placement  Tachycardia, suspected early sepsis      PLAN:    On Zosyn  NPO  Suspect poorly draining left dj ureteral stent and sepsis, will exchange and upsize stent  I discussed risks of cystoscopy, left rpg, left dj ureteral stent exchange including bleeding, infection, injury to bladder, ureter, kidney, need for additional procedures, inability to exchange her stent, voiding c/o due to stent irritation including hematuria, frequency, urgency, and dysuria. I discussed alternatives of continued expectant management and PCN placement, and questions were answered.      Follow up arranged? no.      Alessio Agrawal MD    [phone]    June 1, 2024 4:25 PM        Chief Complaint   Patient presents with    Flank Pain    Leg Pain     left       HISTORY OF PRESENT ILLNESS:  Garrison Buenrostro is a 53 y.o. female who is seen in consultation as referred by Dr. Dr Roque  for abdominal pain, tachycardia, ABUNDIO after left dj ureteral stent placement on 5/25/2024. .    HPI:  Location left flank  Duration 24 hours   Associated signs/symptoms nausea/vomiting, fevers  Modifying factors none  Severity moderate    CT 6/1/2024  The right kidney is surgically absent. There is a stone  within the  Adal Weems MD        rOPINIRole (REQUIP) tablet 0.5 mg  0.5 mg Oral BID Adal Weems MD        SUMAtriptan (IMITREX) tablet 50 mg  50 mg Oral Once Edd Roque MD             PHYSICAL EXAMINATION:     /60   Pulse (!) 119   Temp 99.4 °F (37.4 °C) (Oral)   Resp 20   Ht 1.626 m (5' 4\")   Wt 109.3 kg (241 lb)   SpO2 92%   BMI 41.37 kg/m²   Constitutional: Well developed, well-nourished female in no acute distress.   HEENT: Normocephalic, Atraumatic   CV:   no edema   Respiratory: No respiratory distress or difficulties breathing   Abdomen:  Soft, left sided tenderness   Female:  CVA tenderness: left                       Steinberg: clear urine   Skin:  Normal color. No evidence of jaundice.     Neuro/Psych:  Patient with appropriate affect.  Alert and oriented.        REVIEW OF LABS AND IMAGING:         Labs: Results:   Chemistry    Recent Labs     05/31/24 2148 06/01/24  1336    137   K 4.4 3.9    106   CO2 27 25   BUN 17 19*   GLOB 4.3*  --       CBC w/Diff Recent Labs     05/31/24 2148 06/01/24  1336   WBC 8.4 7.8   RBC 3.62* 3.47*   HGB 10.6* 10.1*   HCT 34.0* 33.5*    245      Cultures Invalid input(s): \"CULT\"  [unfilled]      Urinalysis Potassium   Date Value Ref Range Status   06/01/2024 3.9 3.5 - 5.5 mmol/L Final     BUN   Date Value Ref Range Status   06/01/2024 19 (H) 7.0 - 18 MG/DL Final      Coagulation Lab Results   Component Value Date/Time    INR 1.1 05/25/2024 05:44 AM

## 2024-06-01 NOTE — PROGRESS NOTES
Pharmacy Note - Therapeutic Interchange    Pulmicort Respules® (budesonide) + Arformoterol 15mcg/2ml Nebulization Solution were therapeutically interchanged for Symbicort® (budesonide/formoterol) per the P&T Committee approved Therapeutic Interchanges Policy. Approved dose conversion listed in table below.    Medication Ordered Preferred Medication Dispensed   Advair Diskus® (fluticasone/salmeterol) 100/50 mcg BID       Pulmicort Respules® (budesonide) 0.25mg/2ml BID  +  Arformoterol 15mcg/2ml BID   Advair HFA® (fluticasone/salmeterol ) 45/21mcg BID    Fluticasone/salmeterol (Airduo Respiclick) 55/14mcg 1 puff BID    Breo Ellipta® (fluticasone/vilanterol) 100/25 mcg daily    Dulera® (mometasone/formoterol) 100/5 mcg 2 puffs BID    Symbicort® (budesonide/formoterol) 80/4.5 mcg BID        SCOOTER COBB RPH, Pharmacist  6/1/2024 3:09 AM       Pharmacy Note - Therapeutic Interchange    Wellbutrin  mg daily was therapeutically interchanged for Wellbutrin  mg BID per the P&T Committee approved Therapeutic Interchanges Policy      SCOOTER COBB RPH, Pharmacist  6/1/2024 3:09 AM

## 2024-06-01 NOTE — PROGRESS NOTES
Vitals:    06/01/24 1600   BP: 122/60   Pulse: (!) 117   Resp: 20   Temp: 99.4 °F (37.4 °C)   SpO2: 92%     General:  Awake, alert  Cardiovascular:  S1S2+, RRR  Pulmonary: Coarse breath sounds bilaterally.  Currently no wheezing is heard  GI:  Soft, BS+, NT, ND, obese  Extremities:  trace edema      Patient was admitted earlier today by my colleague.  Patient recently had a left ureteric stent placed.  Patient with evidence of UTI.  Patient was also noted to be hypoxic at admission.  CTA did not show any PE in the large vessels.  Patient was initially started on ceftriaxone.  When I came to see the patient she was complaining of some chills and was noted to be shivering.  Patient is also tachycardic.  Given concern for sepsis I have broadened antibiotic coverage with Zosyn.  Worsening renal function also noted.  Nephrology consulted.  Persistent hypoxia noted.  I have consulted pulmonary.  Patient continues to have abdominal pain which is attributed to the recent stent placement.  I have requested Steinberg placement.  Discussed with RN I have also consulted urology and he will see the patient today.  I have upgraded patient to stepdown status.  I discussed with the patient and the nursing staff.  Per nursing staff patient is a difficult stick and patient is requesting a line placement.  I have advised RN to consult the vascular access team for midline.

## 2024-06-01 NOTE — PLAN OF CARE
Problem: Discharge Planning  Goal: Discharge to home or other facility with appropriate resources  Outcome: Progressing  Flowsheets (Taken 6/1/2024 0530)  Discharge to home or other facility with appropriate resources:   Identify barriers to discharge with patient and caregiver   Arrange for needed discharge resources and transportation as appropriate     Problem: Pain  Goal: Verbalizes/displays adequate comfort level or baseline comfort level  Outcome: Progressing     Problem: Safety - Adult  Goal: Free from fall injury  Outcome: Progressing     Problem: Discharge Planning  Goal: Discharge to home or other facility with appropriate resources  Outcome: Progressing  Flowsheets (Taken 6/1/2024 0530)  Discharge to home or other facility with appropriate resources:   Identify barriers to discharge with patient and caregiver   Arrange for needed discharge resources and transportation as appropriate     Problem: Pain  Goal: Verbalizes/displays adequate comfort level or baseline comfort level  Outcome: Progressing     Problem: Safety - Adult  Goal: Free from fall injury  Outcome: Progressing

## 2024-06-01 NOTE — ANESTHESIA PRE PROCEDURE
Department of Anesthesiology  Preprocedure Note       Name:  Garrison Buenrostro   Age:  53 y.o.  :  1970                                          MRN:  [id number]         Date:  2024      Surgeon: Surgeon(s):  Alessio Agrawal MD    Procedure: Procedure(s):  CYSTOSCOPY LEFT RETROGRADE LEFT STENT PLACEMENT    Medications prior to admission:   Prior to Admission medications    Medication Sig Start Date End Date Taking? Authorizing Provider   ondansetron (ZOFRAN) 4 MG tablet Take 1 tablet by mouth every 8 hours as needed for Nausea or Vomiting 5/27/24 6/3/24  Manny Oshea DO   albuterol sulfate HFA (VENTOLIN HFA) 108 (90 Base) MCG/ACT inhaler Inhale 2 puffs into the lungs 4 times daily as needed for Wheezing 24   Manny Oshea DO   hydrOXYzine HCl (ATARAX) 25 MG tablet Take 1 tablet by mouth every 8 hours as needed for Anxiety 5/27/24 6/10/24  Manny Oshea DO   tamsulosin (FLOMAX) 0.4 MG capsule Take 1 capsule by mouth daily  Patient not taking: Reported on 2024  Manny Oshea DO   trospium (SANCTURA) 20 MG tablet Take 1 tablet by mouth 2 times daily as needed (stent discomfort)  Patient not taking: Reported on 2024  Manny Oshea DO   phenazopyridine (PYRIDIUM) 200 MG tablet Take 1 tablet by mouth 3 times daily as needed for Pain  Patient not taking: Reported on 2024  Manny Oshea DO   pregabalin (LYRICA) 100 MG capsule Take 1 capsule by mouth 2 times daily for 30 days. Max Daily Amount: 200 mg 3/21/24 4/20/24  Yasmeen Rothman MD   QUEtiapine (SEROQUEL XR) 300 MG extended release tablet  2/15/24   Provider, MD Kayla   Nebulizers ([employer] Voice [employer]ognition [employer] AEROSOL DELIVERY SYSTEM) [facility] USE NEBULIZER MEDS AS DIRECTED FOR SHORTNESS OF BREATH. 24   Provider, MD Kayla   albuterol sulfate HFA (PROVENTIL;VENTOLIN;PROAIR) 108 (90 Base) MCG/ACT inhaler Inhale 2 puffs into the lungs every 4 hours as needed

## 2024-06-01 NOTE — PROGRESS NOTES
1215-tele called, pt heart rate in the 150's. Went to room to check on her. Pt was ambulating back to bed. She stated she is having difficulty urinating. Bladder scan did not show anything. Pt visibly shaking due to chills. Temp checked. 97.5 and O2 was 92% on 2 L NC.     1230- Pt given xanax and bentyl    1300-MD arrived and orders placed for stat labs and transfer to stepdown.    Antepartum Progress Note    Assessment/Plan   Maria Isabel Cutler is a 24 y.o.  at 33w5d. SHIN: 2024, by Last Menstrual Period.     24 y.o.  at 33w5d with T1DM admitted for DKA     T1DM, DKA resolved  - DKA diagnosed on admission -> resolved on , however patient's insulin pump ran our of insulin on evening of  -> labs subsequently notable for BHB 3.97, AG 17, pH 7.34. DKA now resolved. Lats BHB 0.15, anion gap closed and pH 7.37  - currently on insulin gtt with fluids per protocol    - most recent BG 97, insulin gtt at 1.0 units/hr  - CGM replaced, however pt missing sensor until now. Sensor connecting, plan to transition to pump once connected. Pump settings confirmed on patient's device.  - Plan for transition off insulin gtt 1hr after pump is connected if sugars remain well controlled.     IUP  - CEFM on L&D, FHT cat 1  - Last growth & BPP performed 10/24, EFW 1294g (42%), AC (48%); SSUS done  read pending  - GBS+  - Known fetal cardiac anomaly--> VS slightly thickened needs, non urgent peds cards prior to  discharge  - Needs  testing scheduled as outpatient      cHTN  - Normotensive  - Not on medications     Asthma  - Home flovent ordered  - Albuterol PRN, currently asymptomatic     +Oligoclonal bands  - Diagnosed after admission for blurry vision  - S/p optho (dx macular disorder, for retinal procedure)  - Neuro immunology follow up scheduled outpatient      HSV  - On valtrex  -Asymptomatic     Contraception  -Needs to be addressed prior to disch      Dispo: cEFM on mac2 while on insulin gtt until transitioned back to insulin pump    Discussed with Dr. Levy Damon MD PGY-2    Principal Problem:    Labor and delivery indication for care or intervention    Subjective   Patient denies any nausea or vomiting. Good fetal movement, denies vaginal bleeding, ctxs and LOF. Sensor now at bedside, patient planning to connect to pump now.    Objective    Allergies:   Patient has no known allergies.    Last Vitals:  Temp Pulse Resp BP MAP Pulse Ox   36.9 °C (98.4 °F) 88 16 122/82   100 %     Vitals Min/Max Last 24 Hours:  Temp  Min: 36.7 °C (98.1 °F)  Max: 37.1 °C (98.8 °F)  Pulse  Min: 82  Max: 112  Resp  Min: 16  Max: 18  BP  Min: 107/74  Max: 122/82    Intake/Output:     Intake/Output Summary (Last 24 hours) at 11/23/2023 1954  Last data filed at 11/23/2023 1400  Gross per 24 hour   Intake --   Output 1500 ml   Net -1500 ml       Physical Exam:  General: no acute distress  HEENT: normocephalic, atraumatic  Heart: warm and well perfused  Lungs: breathing comfortably on room air  Abdomen: gravid, soft, nontender to palpation  Extremities: moving all extremities  Neuro: awake and conversant  Psych: appropriate mood and affect    Baseline Fetal Heart Rate (bpm): discontinous with patient sitting up, overall baseline 140 bpm  Baseline Classification: Normal  Variability: Moderate (Between 6 and 25 BPM)  Pattern: Accelerations  FHR Category: Category I  Multiple Births: No   Fetal Decelerations: No  Contractions: irregular, irritability       Lab Data:  Lab Results   Component Value Date    GLUCOSE 68 (L) 11/23/2023     11/23/2023    K 3.4 (L) 11/23/2023     (H) 11/23/2023    CO2 22 11/23/2023    ANIONGAP 11 11/23/2023    BUN 5 (L) 11/23/2023    CREATININE 0.34 (L) 11/23/2023    EGFR >90 11/23/2023    CALCIUM 8.5 (L) 11/23/2023    ALBUMIN 3.1 (L) 11/23/2023    PROT 5.6 (L) 11/23/2023    ALKPHOS 65 11/23/2023    ALT 8 11/23/2023    AST 8 (L) 11/23/2023    BILITOT 0.2 11/23/2023     .  Beta-Hydroxybutyrate   Date/Time Value Ref Range Status   11/23/2023 03:39 AM 0.15 0.02 - 0.27 mmol/L Final   11/22/2023 10:45 PM 0.16 0.02 - 0.27 mmol/L Final     Comment:     MILD HEMOLYSIS DETECTED. The result may be falsely elevated due to hemolysis or other interferents. Clinical correlation is recommended. Repeat testing may be considered.

## 2024-06-01 NOTE — PROGRESS NOTES
1348: TRANSFER - IN REPORT:    Verbal report received from VILMA Mims on Garrison Buenrostro  being received from [address] [place] [address] for change in patient condition (chills, tachy)      Report consisted of patient's Situation, Background, Assessment and   Recommendations(SBAR).     Information from the following report(s) Nurse Handoff Report, Index, Adult Overview, Intake/Output, MAR, and Recent Results was reviewed with the receiving nurse.    Opportunity for questions and clarification was provided.      Assessment completed upon patient's arrival to unit and care assumed.      1445: Pt arrived to unit via bed accompanied by RN and PCT. Vitals completed. Pt in NAD. Pt educated on use of call light with all needs.     1450: Patient /Family /Designee has been informed that [facility] is not responsible for patient belongings per policy and the signed [facility] Patient Agreement document.  Personal items should be sent home or checked in with security.  Patient /Family /Designee selected the following action:                            []  Send personal items home with a family member or friend                                                 []  Check in personal items with security, excluding clothing                            [x]  Maintain personal items at the bedside, against recommendation                                 by [facility]                                   ** If patient /family /designee chooses to maintain personal items at the bedside,                                      Complete the patient belongings inventory in the EMR.     1500: Pt's family at bedside.     1505: MD paged regarding pt's oxygen requirement.     1510: House supervisor called regarding PICC team for midline placement.     1530: 4 Eyes Skin Assessment     NAME:  Garrison Buenrostro  YOB: 1970  MEDICAL RECORD NUMBER:  [id number]    The patient is being assessed for  Head, Face, Ears, Shoulders, Back,

## 2024-06-01 NOTE — PROGRESS NOTES
capsule by mouth 2 times daily    Kayla Donis MD   Cholecalciferol 50 MCG (2000 UT) TABS Take 1 tablet by mouth daily  Patient not taking: Reported on 6/1/2024    Kayla Donis MD   clotrimazole-betamethasone (LOTRISONE) 1-0.05 % cream  11/21/23   Kayla Donis MD   lidocaine (XYLOCAINE) 5 % ointment Apply 2 g topically 3 times daily    Kayla Donis MD   linaclotide (LINZESS) 290 MCG CAPS capsule Take 1 capsule by mouth daily  Patient not taking: Reported on 6/1/2024    Kayla Donis MD   melatonin 5 MG TABS tablet Take 2 tablets by mouth nightly  Patient not taking: Reported on 6/1/2024 12/16/23   Kayla Donis MD   omeprazole (PRILOSEC) 40 MG delayed release capsule Take 1 capsule by mouth daily  Patient not taking: Reported on 6/1/2024    Kayla Donis MD   rizatriptan (MAXALT) 10 MG tablet  11/25/23   Kayla Donis MD   sertraline (ZOLOFT) 100 MG tablet Take by mouth daily    Kayla Donis MD   temazepam (RESTORIL) 30 MG capsule Take 1 capsule by mouth nightly.    Kayla Donis MD   tiZANidine (ZANAFLEX) 2 MG tablet Take 1 pill approx 2 hours before bedtime    Kayla Donis MD   topiramate (TOPAMAX) 50 MG tablet  11/16/23   Kayla Donis MD   traZODone (DESYREL) 100 MG tablet TAKE 1/2 TABLET AT BEDTIME FOR 1 WEEK, THEN INCREASE TO 1 TABLET AT BEDTIME NIGHTLY    Kayla Donis MD   buPROPion (WELLBUTRIN SR) 150 MG extended release tablet TAKE 1 TABLET BY MOUTH EVERY MORNING AND AT NOON 12/16/23   Kayla Donis MD   gabapentin (NEURONTIN) 800 MG tablet Take 1 tablet by mouth 3 times daily as needed. 11/28/23   Kayla Donis MD   budesonide-formoterol (SYMBICORT) 80-4.5 MCG/ACT AERO Inhale 2 puffs into the lungs in the morning and 2 puffs in the evening.    Kayla Donis MD   dicyclomine (BENTYL) 20 MG tablet Take 1 tablet by mouth 4 times daily  Patient not taking: Reported on 6/1/2024  19*   CREATININE 1.66* 2.39*   GLOB 4.3*  --    ALT 27  --    AST 42*  --       CBC w/Diff Recent Labs     05/31/24  2148 06/01/24  1336   WBC 8.4 7.8   RBC 3.62* 3.47*   HGB 10.6* 10.1*   HCT 34.0* 33.5*    245          Assessment/Plan     Principal Problem:    Acute hypoxic on chronic hypercapnic respiratory failure (HCC)  Active Problems:    Seizures (HCC)    Left renal stone    Ureteral colic    Anxiety    Moderate episode of recurrent major depressive disorder (HCC)    Respiratory failure (HCC)    Polypharmacy    Left upper quadrant abdominal pain    Ureteral stent present    Acute respiratory failure with hypoxia and hypercapnia (HCC)    ARF (acute renal failure) (HCC)    Single kidney    UTI (urinary tract infection)  Resolved Problems:    * No resolved hospital problems. *      Patient has acute renal failure.  Not sure exact cause but definitely at risk for ATN also needs to rule out any obstructive cause given the history of his stent and recent stent placement the distribution of his symptoms as per the recent CT.  Also urine suspicious for UTI her pain is mostly from ovulatory chronic causing this discomfort.  Have changed IV fluid to lactated Ringer's at 100 cc/h.  Discontinued normal saline.  Agree with insertion of the Steinberg catheter.  Strict in and out record.  Follow urine culture.  Renal ultrasound to rule out any obstruction.  Urology need to see to decide whether she needs cystoscopy and to check the position of the stent.  Avoid nephrotoxins.  No NSAIDs.  Will give potassium citrate for history of renal stones that might help..  Monitored in stepdown bed.  Adjust the doses of Neurontin given her ARF.  Will check PTH given her history of renal stones for a long time.  Ropinirole for her possible restless leg syndrome.  Will follow.

## 2024-06-01 NOTE — ED PROVIDER NOTES
EMERGENCY DEPARTMENT HISTORY AND PHYSICAL EXAM      Patient Name: Garrison Buenrostro  MRN: [id number]  YOB: 1970  Provider: Ruma Mckenzie PA-C  PCP: Yasmeen Rothman MD   Time/Date of evaluation: 10:06 PM EDT on 5/31/24    History of Presenting Illness     Chief Complaint   Patient presents with    Flank Pain    Leg Pain     left       History Provided By: Patient     History (Narative):   Garrison Buenrostro is a 53 y.o. female with a PMHX of PTSD, sciatica,  who presents to the emergency department  by EMS C/O of left flank pain that radiates down her left leg.  Patient states that it started this morning and has been persistent since.  She states that she has been taking over the counter medications she just Tylenol with minimal relief .patient has had recent surgery on the 25th, a stent placement for an obstructing stone.    Patient denies any fever or chills shortness of breath or chest pain.    However, patient was noted to be hypoxic and tachycardic on triage.    Past History     Past Medical History:  Past Medical History:   Diagnosis Date    PTSD (post-traumatic stress disorder)     Sciatica     Seizures (HCC)        Past Surgical History:  Past Surgical History:   Procedure Laterality Date    APPENDECTOMY      CYSTOSCOPY Left 5/25/2024    CYSTOSCOPY ; LEFT RETROGRADE PYELOGRAM; LEFT DOUBLE J URETERAL STENT INSERTION performed by Junior Ochoa MD at [facility] MAIN OR    KIDNEY REMOVAL Right     2014    LEG SURGERY Left        Family History:  History reviewed. No pertinent family history.    Social History:  Social History     Tobacco Use    Smoking status: Never     Passive exposure: Never    Smokeless tobacco: Never   Vaping Use    Vaping Use: Never used   Substance Use Topics    Alcohol use: Never    Drug use: Never       Medications:  No current facility-administered medications for this encounter.     Current Outpatient Medications   Medication Sig Dispense Refill    ondansetron (ZOFRAN) 4 MG  time    Aggregate critical care time was 45 minutes, which includes only time during which I was engaged in work directly related to the patient's care as described above, whether I was at bedside or elsewhere in the Emergency Department. It did not include time spent performing other reported procedures or the services of residents, students, or nurses       Ruma Andrade PA-C      2:34 AM     Ruma Andrade     Diagnosis and Disposition     DIAGNOSIS: No diagnosis found.    DISPOSITION Admitted 06/01/2024 02:28:14 AM      PATIENT REFERRED TO:  No follow-up provider specified.    DISCHARGE MEDICATIONS:  New Prescriptions    No medications on file       DISCONTINUED MEDICATIONS:  Discontinued Medications    No medications on file              (Please note that portions of this note were completed with a voice recognition program.  Efforts were made to edit the dictations but occasionally words are mis-transcribed.)    Ruma Andrade PA-C (electronically signed)    I Ruma Andrade PA-C am the primary clinician of record.    Dragon Disclaimer     Please note that this dictation was completed with Duplia, the computer voice recognition software.  Quite often unanticipated grammatical, syntax, homophones, and other interpretive errors are inadvertently transcribed by the computer software.  Please disregard these errors.  Please excuse any errors that have escaped final proofreading.       Ruma Andrade PA-C  06/01/24 0237

## 2024-06-01 NOTE — PERIOP NOTE
I received face to face report from Angela FINNEY CRNA that got telephone report from Umer ESPARZA for patient in room #356. Called nurse myself to get status of HCG test.

## 2024-06-01 NOTE — OP NOTE
Patient: Garrison Buenrostro Age: 53 y.o. Sex: female    YOB: 1970 Visit Date: 6/1/2024 PCP: Yasmeen Rothman MD   MRN: [id number]  [facility]: [id number]         PREOPERATIVE DIAGNOSIS:  left ureteral obstruction.    POSTOPERATIVE DIAGNOSIS:  left ureteral obstruction.    PROCEDURE:  1.  Cystoscopy.  2.  left retrograde pyelogram.  3.  left double-J ureteral stent exchange (7 f x 26 cm).    SURGEON:  Alessio Agrawal MD    SURGICAL ASSISTANT:  None.    ANESTHESIA:  general    ESTIMATED BLOOD LOSS:  Minimal.    COMPLICATIONS:  None.    SPECIMENS:  None.    HISTORY:  Garrison Buenrostro is a 53 y.o. female who has has history of left ureteral obstruction secondary to a stone and underwent stent placement 5/24/2024.  She presents today secondary to fever with shaking chills, nausea/vomiting/ tachycardia, and worsening hydronephrosis on CT consistent with poorly functioning stent.   We discussed risks and benefits of cystoscopy, left double-J ureteral stent exchange.  She understood these risks and benefits and desired to proceed.    DESCRIPTION OF PROCEDURE:  After informed consent was obtained, the patient taken to the operating room and placed on the operating table in the supine position.  SCDs were placed for DVT prophylaxis and she received Zosyn preoperatively for perioperative prophylactic antibiotics.  General anesthesia was administered.  After adequate sedation was obtained, the patient was placed in lithotomy position.  Perineum and genitalia prepped and draped in the usual sterile fashion.  A timeout was called.  After calling the time out, cystoscopy was performed using a 22-Greenlandic rigid cystoscope.  This was placed per the urethra in the urinary bladder under direct vision.  The urethra was normal throughout its course.  The bladder was inspected throughout its entirety.  There was no evidence of bladder mucosal lesions or tumor seen.  The right and left ureteral orifices were identified and normal

## 2024-06-01 NOTE — CONSULTS
8.5 - 10.1 MG/DL   Lactic Acid    Collection Time: 06/01/24  1:36 PM   Result Value Ref Range    Lactic Acid, Plasma 1.1 0.4 - 2.0 MMOL/L   CBC with Auto Differential    Collection Time: 06/01/24  1:36 PM   Result Value Ref Range    WBC 7.8 4.6 - 13.2 K/uL    RBC 3.47 (L) 4.20 - 5.30 M/uL    Hemoglobin 10.1 (L) 12.0 - 16.0 g/dL    Hematocrit 33.5 (L) 35.0 - 45.0 %    MCV 96.5 78.0 - 100.0 FL    MCH 29.1 24.0 - 34.0 PG    MCHC 30.1 (L) 31.0 - 37.0 g/dL    RDW 14.2 11.6 - 14.5 %    Platelets 245 135 - 420 K/uL    MPV 10.1 9.2 - 11.8 FL    Nucleated RBCs 0.0 0  WBC    nRBC 0.00 0.00 - 0.01 K/uL    Neutrophils % 64 40 - 73 %    Lymphocytes % 23 21 - 52 %    Monocytes % 8 3 - 10 %    Eosinophils % 4 0 - 5 %    Basophils % 0 0 - 2 %    Immature Granulocytes % 0 0.0 - 0.5 %    Neutrophils Absolute 5.0 1.8 - 8.0 K/UL    Lymphocytes Absolute 1.8 0.9 - 3.6 K/UL    Monocytes Absolute 0.6 0.05 - 1.2 K/UL    Eosinophils Absolute 0.3 0.0 - 0.4 K/UL    Basophils Absolute 0.0 0.0 - 0.1 K/UL    Immature Granulocytes Absolute 0.0 0.00 - 0.04 K/UL    Differential Type AUTOMATED     Magnesium    Collection Time: 06/01/24  1:36 PM   Result Value Ref Range    Magnesium 1.5 (L) 1.6 - 2.6 mg/dL   Sodium, urine, random    Collection Time: 06/01/24  1:45 PM   Result Value Ref Range    SODIUM, RANDOM URINE 27 20 - 110 MMOL/L   Potassium, urine, random    Collection Time: 06/01/24  1:45 PM   Result Value Ref Range    POTASSIUM, RANDOM URINE 35 12.0 - 62 MMOL/L   Chloride, Random Urine    Collection Time: 06/01/24  1:45 PM   Result Value Ref Range    Chloride 28 (L) 55 - 125 MMOL/L   Osmolality, Urine    Collection Time: 06/01/24  1:45 PM   Result Value Ref Range    Osmolality, Ur 280 MOSM/kg H2O       Imaging:  I have personally reviewed the patient’s radiographs and have reviewed the reports:  XR Results (most recent):      CT Results (most recent):  Narrative & Impression  CTA chest- pulmonary embolus protocol      Indications:  Hypoxia and tachycardia.     Technique: Utilizing pulmonary embolus protocol, thin section axial images were  obtained from the thoracic inlet into the upper abdomen after the uneventful  administration of IV contrast. Coronal and sagittal maximum intensity projection  (MIP) post-processed images were generated to better define pulmonary artery  anatomy and enhance sensitivity for detection of pulmonary emboli.  Contrast used: 80 cc Visipaque 320     All CT scans at this facility are performed using dose optimization technique as  appropriate to a performed exam, to include automated exposure control,  adjustment of the mA and/or kV according to patient size (including appropriate  matching for site-specific examinations), or use of iterative reconstruction  technique.     Comparison: None.     Findings:     Adequate quality opacification. There is a good contrast bolus within the  proximal pulmonary artery tree however contrast bolus tapers significantly by  distal segmental branches. The result is that the distal segmental and  subsegmental branches are not diagnostically evaluated. No central embolus is  seen however.     The base of the neck appears unremarkable.  Thoracic aorta not adequately opacified for luminal evaluation. No aneurysm is  seen however.  There are no definite coronary artery calcifications. The heart is not enlarged.  No pericardial effusion.  No mediastinal or hilar adenopathy is seen.  Respiratory motion degrades evaluation of the lungs somewhat. Mild edema is  suspected. There is a plaque-like nodule within the minor fissure (axial 144;  sagittal 50), probably representing an intrafissural node.     The visualized upper abdomen shows a single 7 mm nonobstructing stone within the  left kidney.     IMPRESSION:  :     No central embolus is seen. Peripheral pulmonary artery branches are not  adequately opacified for diagnostic purposes.     Mild pulmonary edema suspected.     Nonobstructing

## 2024-06-01 NOTE — ED TRIAGE NOTES
Pt wheeled to triage c/o left leg pain that shoots up to under her left breast that started today. Pt recently had a stent placed for kidney stone. Pt only has one kidney.

## 2024-06-01 NOTE — PROCEDURES
PICC Line Insertion Procedure Note    Procedure: Insertion of #4 FR/18G Midline    Indications:  Poor Access    Procedure Details   Informed consent was obtained for the procedure, including sedation.  Risks of lung perforation, hemorrhage, and adverse drug reaction were discussed.     #4 FR/18G Midline inserted to the L Basilic vein per hospital protocol.   Blood return:  yes    Findings:  Catheter inserted to 12 cm, with 0 cm. Exposed. Mid upper arm circumference is 44 cm.  There were no changes to vital signs. Catheter was flushed with 10 cc NS. Patient did tolerate procedure well.    Recommendations:    Date: 6/1/2024   Name: Garrison Buenrostro  YOB: 1970    Procedures

## 2024-06-01 NOTE — H&P
Hospitalist Admission History and Physical    NAME:  Garrison Buenrostro   :   1970   MRN:   [id number]     PCP:  Yasmeen Rothman MD  Date/Time:  2024 3:06 AM  Subjective:   CHIEF COMPLAINT: Abdominal pain    HISTORY OF PRESENT ILLNESS:     Garrison is a 53 y.o.   female with history of recent left ureteral stent, discharged few days ago in the hospital, also history of polypharmacy severe depression anxiety on multiple psych meds came in with recurrent abdominal pain worse at home since she was discharged; patient was found to be hypoxic originally started on high flow currently down to 2 to 3 L of oxygen.    CT of the chest was negative for PE; or pna;  No central embolus is seen. Peripheral pulmonary artery branches are not  adequately opacified for diagnostic purposes.     Mild pulmonary edema suspected.     Nonobstructing left renal stone noted.    CT a/p;   IMPRESSION:  There is some stranding around the left ovarian vein, new from the comparison  study, suggesting phlebitis. Nothing to specifically suggest ovarian vein  thrombosis although this is a consideration.      The left ureteral stent appears well-positioned however there is mild  pelvocaliectasis which is new from the comparison study.     Other findings are stable.     Patient's troponin was negative, creatinine was 1.6; UA looked dirty looks like UTI  UDS positive for opiates and benzos;  CBC was normal  D-dimer was 1.4    Patient's main concern is her abdominal pain she does not have any shortness of breath or chest pain or nausea vomiting diarrhea.  She wanted more pain meds.        Past Medical History:   Diagnosis Date    PTSD (post-traumatic stress disorder)     Sciatica     Seizures (HCC)         Past Surgical History:   Procedure Laterality Date    APPENDECTOMY      CYSTOSCOPY Left 2024    CYSTOSCOPY ; LEFT RETROGRADE PYELOGRAM; LEFT DOUBLE J URETERAL STENT INSERTION performed by Junior Ochoa MD at [facility] MAIN OR    KIDNEY

## 2024-06-02 ENCOUNTER — APPOINTMENT (OUTPATIENT)
Facility: HOSPITAL | Age: 54
DRG: 463 | End: 2024-06-02
Attending: INTERNAL MEDICINE
Payer: MEDICAID

## 2024-06-02 ENCOUNTER — APPOINTMENT (OUTPATIENT)
Facility: HOSPITAL | Age: 54
DRG: 463 | End: 2024-06-02
Payer: MEDICAID

## 2024-06-02 PROBLEM — R09.02 HYPOXIA: Status: ACTIVE | Noted: 2024-06-02

## 2024-06-02 PROBLEM — R10.9 FLANK PAIN: Status: ACTIVE | Noted: 2024-06-02

## 2024-06-02 LAB
ALBUMIN SERPL-MCNC: 3.1 G/DL (ref 3.4–5)
ALBUMIN SERPL-MCNC: 3.1 G/DL (ref 3.4–5)
ALBUMIN/GLOB SERPL: 0.7 (ref 0.8–1.7)
ALBUMIN/GLOB SERPL: 0.8 (ref 0.8–1.7)
ALP SERPL-CCNC: 76 U/L (ref 45–117)
ALP SERPL-CCNC: 86 U/L (ref 45–117)
ALT SERPL-CCNC: 20 U/L (ref 13–56)
ALT SERPL-CCNC: 21 U/L (ref 13–56)
ANION GAP SERPL CALC-SCNC: 3 MMOL/L (ref 3–18)
ANION GAP SERPL CALC-SCNC: 5 MMOL/L (ref 3–18)
AST SERPL-CCNC: 18 U/L (ref 10–38)
AST SERPL-CCNC: 19 U/L (ref 10–38)
BACTERIA SPEC CULT: NORMAL
BASOPHILS # BLD: 0 K/UL (ref 0–0.1)
BASOPHILS NFR BLD: 0 % (ref 0–2)
BILIRUB SERPL-MCNC: 0.9 MG/DL (ref 0.2–1)
BILIRUB SERPL-MCNC: 1 MG/DL (ref 0.2–1)
BUN SERPL-MCNC: 22 MG/DL (ref 7–18)
BUN SERPL-MCNC: 25 MG/DL (ref 7–18)
BUN/CREAT SERPL: 8 (ref 12–20)
BUN/CREAT SERPL: 9 (ref 12–20)
CALCIUM SERPL-MCNC: 8.8 MG/DL (ref 8.5–10.1)
CALCIUM SERPL-MCNC: 9.1 MG/DL (ref 8.5–10.1)
CALCIUM SERPL-MCNC: 9.4 MG/DL (ref 8.5–10.1)
CHLORIDE SERPL-SCNC: 108 MMOL/L (ref 100–111)
CHLORIDE SERPL-SCNC: 108 MMOL/L (ref 100–111)
CO2 SERPL-SCNC: 25 MMOL/L (ref 21–32)
CO2 SERPL-SCNC: 25 MMOL/L (ref 21–32)
CREAT SERPL-MCNC: 2.7 MG/DL (ref 0.6–1.3)
CREAT SERPL-MCNC: 2.71 MG/DL (ref 0.6–1.3)
DIFFERENTIAL METHOD BLD: ABNORMAL
ECHO AO ROOT DIAM: 2.6 CM
ECHO AO ROOT INDEX: 1.23 CM/M2
ECHO BSA: 2.22 M2
ECHO EST RA PRESSURE: 8 MMHG
ECHO LA VOL A-L A2C: 55 ML (ref 22–52)
ECHO LA VOL A-L A4C: 43 ML (ref 22–52)
ECHO LA VOL BP: 46 ML (ref 22–52)
ECHO LA VOL MOD A2C: 50 ML (ref 22–52)
ECHO LA VOL MOD A4C: 40 ML (ref 22–52)
ECHO LA VOL/BSA BIPLANE: 22 ML/M2 (ref 16–34)
ECHO LA VOLUME AREA LENGTH: 50 ML
ECHO LA VOLUME INDEX A-L A2C: 26 ML/M2 (ref 16–34)
ECHO LA VOLUME INDEX A-L A4C: 20 ML/M2 (ref 16–34)
ECHO LA VOLUME INDEX AREA LENGTH: 24 ML/M2 (ref 16–34)
ECHO LA VOLUME INDEX MOD A2C: 24 ML/M2 (ref 16–34)
ECHO LA VOLUME INDEX MOD A4C: 19 ML/M2 (ref 16–34)
ECHO LV E' LATERAL VELOCITY: 13 CM/S
ECHO LV E' SEPTAL VELOCITY: 12 CM/S
ECHO LV FRACTIONAL SHORTENING: 28 % (ref 28–44)
ECHO LV INTERNAL DIMENSION DIASTOLE INDEX: 2.03 CM/M2
ECHO LV INTERNAL DIMENSION DIASTOLIC: 4.3 CM (ref 3.9–5.3)
ECHO LV INTERNAL DIMENSION SYSTOLIC INDEX: 1.46 CM/M2
ECHO LV INTERNAL DIMENSION SYSTOLIC: 3.1 CM
ECHO LV IVSD: 1 CM (ref 0.6–0.9)
ECHO LV MASS 2D: 132.7 G (ref 67–162)
ECHO LV MASS INDEX 2D: 62.6 G/M2 (ref 43–95)
ECHO LV POSTERIOR WALL DIASTOLIC: 0.9 CM (ref 0.6–0.9)
ECHO LV RELATIVE WALL THICKNESS RATIO: 0.42
ECHO LVOT AREA: 2.8 CM2
ECHO LVOT DIAM: 1.9 CM
ECHO LVOT MEAN GRADIENT: 5 MMHG
ECHO LVOT PEAK GRADIENT: 8 MMHG
ECHO LVOT PEAK VELOCITY: 1.4 M/S
ECHO LVOT STROKE VOLUME INDEX: 37 ML/M2
ECHO LVOT SV: 78.5 ML
ECHO LVOT VTI: 27.7 CM
ECHO MV A VELOCITY: 0.98 M/S
ECHO MV E DECELERATION TIME (DT): 177.8 MS
ECHO MV E VELOCITY: 0.79 M/S
ECHO MV E/A RATIO: 0.81
ECHO MV E/E' LATERAL: 6.08
ECHO MV E/E' RATIO (AVERAGED): 6.33
ECHO MV E/E' SEPTAL: 6.58
ECHO RIGHT VENTRICULAR SYSTOLIC PRESSURE (RVSP): 29 MMHG
ECHO RV BASAL DIMENSION: 3.2 CM
ECHO RV FREE WALL PEAK S': 16 CM/S
ECHO RV TAPSE: 2.6 CM (ref 1.7–?)
ECHO TV REGURGITANT MAX VELOCITY: 2.3 M/S
ECHO TV REGURGITANT PEAK GRADIENT: 21 MMHG
EKG ATRIAL RATE: 110 BPM
EKG DIAGNOSIS: NORMAL
EKG P AXIS: 51 DEGREES
EKG P-R INTERVAL: 144 MS
EKG Q-T INTERVAL: 340 MS
EKG QRS DURATION: 68 MS
EKG QTC CALCULATION (BAZETT): 460 MS
EKG R AXIS: 9 DEGREES
EKG T AXIS: 5 DEGREES
EKG VENTRICULAR RATE: 110 BPM
EOSINOPHIL # BLD: 0 K/UL (ref 0–0.4)
EOSINOPHIL NFR BLD: 0 % (ref 0–5)
ERYTHROCYTE [DISTWIDTH] IN BLOOD BY AUTOMATED COUNT: 13.8 % (ref 11.6–14.5)
GLOBULIN SER CALC-MCNC: 4 G/DL (ref 2–4)
GLOBULIN SER CALC-MCNC: 4.3 G/DL (ref 2–4)
GLUCOSE SERPL-MCNC: 113 MG/DL (ref 74–99)
GLUCOSE SERPL-MCNC: 137 MG/DL (ref 74–99)
HCT VFR BLD AUTO: 31.9 % (ref 35–45)
HGB BLD-MCNC: 9.6 G/DL (ref 12–16)
IMM GRANULOCYTES # BLD AUTO: 0 K/UL (ref 0–0.04)
IMM GRANULOCYTES NFR BLD AUTO: 0 % (ref 0–0.5)
LYMPHOCYTES # BLD: 0.4 K/UL (ref 0.9–3.6)
LYMPHOCYTES NFR BLD: 6 % (ref 21–52)
MAGNESIUM SERPL-MCNC: 2.6 MG/DL (ref 1.6–2.6)
MCH RBC QN AUTO: 28.8 PG (ref 24–34)
MCHC RBC AUTO-ENTMCNC: 30.1 G/DL (ref 31–37)
MCV RBC AUTO: 95.8 FL (ref 78–100)
MONOCYTES # BLD: 0 K/UL (ref 0.05–1.2)
MONOCYTES NFR BLD: 0 % (ref 3–10)
NEUTS SEG # BLD: 6 K/UL (ref 1.8–8)
NEUTS SEG NFR BLD: 94 % (ref 40–73)
NRBC # BLD: 0 K/UL (ref 0–0.01)
NRBC BLD-RTO: 0 PER 100 WBC
PHOSPHATE SERPL-MCNC: 3.8 MG/DL (ref 2.5–4.9)
PLATELET # BLD AUTO: 277 K/UL (ref 135–420)
PLATELET COMMENT: ABNORMAL
PMV BLD AUTO: 10.7 FL (ref 9.2–11.8)
POTASSIUM SERPL-SCNC: 4.7 MMOL/L (ref 3.5–5.5)
POTASSIUM SERPL-SCNC: 5 MMOL/L (ref 3.5–5.5)
PROT SERPL-MCNC: 7.1 G/DL (ref 6.4–8.2)
PROT SERPL-MCNC: 7.4 G/DL (ref 6.4–8.2)
PTH-INTACT SERPL-MCNC: 73.6 PG/ML (ref 18.4–88)
RBC # BLD AUTO: 3.33 M/UL (ref 4.2–5.3)
RBC MORPH BLD: ABNORMAL
SERVICE CMNT-IMP: NORMAL
SODIUM SERPL-SCNC: 136 MMOL/L (ref 136–145)
SODIUM SERPL-SCNC: 138 MMOL/L (ref 136–145)
TROPONIN I SERPL HS-MCNC: <3 NG/L (ref 0–54)
WBC # BLD AUTO: 6.4 K/UL (ref 4.6–13.2)

## 2024-06-02 PROCEDURE — 99231 SBSQ HOSP IP/OBS SF/LOW 25: CPT | Performed by: STUDENT IN AN ORGANIZED HEALTH CARE EDUCATION/TRAINING PROGRAM

## 2024-06-02 PROCEDURE — 93306 TTE W/DOPPLER COMPLETE: CPT | Performed by: INTERNAL MEDICINE

## 2024-06-02 PROCEDURE — 93306 TTE W/DOPPLER COMPLETE: CPT

## 2024-06-02 PROCEDURE — 93010 ELECTROCARDIOGRAM REPORT: CPT | Performed by: INTERNAL MEDICINE

## 2024-06-02 PROCEDURE — 6360000002 HC RX W HCPCS: Performed by: INTERNAL MEDICINE

## 2024-06-02 PROCEDURE — 94640 AIRWAY INHALATION TREATMENT: CPT

## 2024-06-02 PROCEDURE — 6370000000 HC RX 637 (ALT 250 FOR IP): Performed by: INTERNAL MEDICINE

## 2024-06-02 PROCEDURE — 99252 IP/OBS CONSLTJ NEW/EST SF 35: CPT | Performed by: STUDENT IN AN ORGANIZED HEALTH CARE EDUCATION/TRAINING PROGRAM

## 2024-06-02 PROCEDURE — 1100000003 HC PRIVATE W/ TELEMETRY

## 2024-06-02 PROCEDURE — 36415 COLL VENOUS BLD VENIPUNCTURE: CPT

## 2024-06-02 PROCEDURE — 2700000000 HC OXYGEN THERAPY PER DAY

## 2024-06-02 PROCEDURE — 94761 N-INVAS EAR/PLS OXIMETRY MLT: CPT

## 2024-06-02 PROCEDURE — 2580000003 HC RX 258: Performed by: INTERNAL MEDICINE

## 2024-06-02 PROCEDURE — 6370000000 HC RX 637 (ALT 250 FOR IP): Performed by: EMERGENCY MEDICINE

## 2024-06-02 PROCEDURE — 83735 ASSAY OF MAGNESIUM: CPT

## 2024-06-02 PROCEDURE — 85025 COMPLETE CBC W/AUTO DIFF WBC: CPT

## 2024-06-02 PROCEDURE — 6360000002 HC RX W HCPCS: Performed by: EMERGENCY MEDICINE

## 2024-06-02 PROCEDURE — 83970 ASSAY OF PARATHORMONE: CPT

## 2024-06-02 PROCEDURE — 2580000003 HC RX 258: Performed by: EMERGENCY MEDICINE

## 2024-06-02 PROCEDURE — 99232 SBSQ HOSP IP/OBS MODERATE 35: CPT | Performed by: EMERGENCY MEDICINE

## 2024-06-02 PROCEDURE — 76770 US EXAM ABDO BACK WALL COMP: CPT

## 2024-06-02 PROCEDURE — 80053 COMPREHEN METABOLIC PANEL: CPT

## 2024-06-02 PROCEDURE — 84484 ASSAY OF TROPONIN QUANT: CPT

## 2024-06-02 PROCEDURE — 6370000000 HC RX 637 (ALT 250 FOR IP): Performed by: STUDENT IN AN ORGANIZED HEALTH CARE EDUCATION/TRAINING PROGRAM

## 2024-06-02 PROCEDURE — 84100 ASSAY OF PHOSPHORUS: CPT

## 2024-06-02 RX ORDER — LACTOBACILLUS RHAMNOSUS GG 10B CELL
1 CAPSULE ORAL
Status: DISCONTINUED | OUTPATIENT
Start: 2024-06-03 | End: 2024-06-04 | Stop reason: HOSPADM

## 2024-06-02 RX ORDER — SODIUM CHLORIDE 9 MG/ML
INJECTION, SOLUTION INTRAVENOUS CONTINUOUS
Status: DISCONTINUED | OUTPATIENT
Start: 2024-06-02 | End: 2024-06-04 | Stop reason: HOSPADM

## 2024-06-02 RX ORDER — SENNA AND DOCUSATE SODIUM 50; 8.6 MG/1; MG/1
2 TABLET, FILM COATED ORAL DAILY
Status: DISCONTINUED | OUTPATIENT
Start: 2024-06-02 | End: 2024-06-04 | Stop reason: HOSPADM

## 2024-06-02 RX ORDER — DIPHENHYDRAMINE HCL 25 MG
25 CAPSULE ORAL EVERY 6 HOURS PRN
Status: DISCONTINUED | OUTPATIENT
Start: 2024-06-02 | End: 2024-06-04 | Stop reason: HOSPADM

## 2024-06-02 RX ORDER — BISACODYL 10 MG
10 SUPPOSITORY, RECTAL RECTAL DAILY PRN
Status: DISCONTINUED | OUTPATIENT
Start: 2024-06-02 | End: 2024-06-04 | Stop reason: HOSPADM

## 2024-06-02 RX ADMIN — SODIUM CHLORIDE, PRESERVATIVE FREE 10 ML: 5 INJECTION INTRAVENOUS at 09:49

## 2024-06-02 RX ADMIN — LINACLOTIDE 290 MCG: 145 CAPSULE, GELATIN COATED ORAL at 11:40

## 2024-06-02 RX ADMIN — HYDROMORPHONE HYDROCHLORIDE 1 MG: 1 INJECTION, SOLUTION INTRAMUSCULAR; INTRAVENOUS; SUBCUTANEOUS at 12:30

## 2024-06-02 RX ADMIN — ENOXAPARIN SODIUM 30 MG: 100 INJECTION SUBCUTANEOUS at 09:47

## 2024-06-02 RX ADMIN — ACETAMINOPHEN 650 MG: 325 TABLET ORAL at 20:43

## 2024-06-02 RX ADMIN — SERTRALINE 100 MG: 50 TABLET, FILM COATED ORAL at 09:48

## 2024-06-02 RX ADMIN — DICYCLOMINE HYDROCHLORIDE 20 MG: 10 CAPSULE ORAL at 09:48

## 2024-06-02 RX ADMIN — PIPERACILLIN AND TAZOBACTAM 4500 MG: 4; .5 INJECTION, POWDER, FOR SOLUTION INTRAVENOUS at 01:06

## 2024-06-02 RX ADMIN — GABAPENTIN 200 MG: 100 CAPSULE ORAL at 20:44

## 2024-06-02 RX ADMIN — BUPROPION HYDROCHLORIDE 300 MG: 150 TABLET, EXTENDED RELEASE ORAL at 09:48

## 2024-06-02 RX ADMIN — SENNOSIDES AND DOCUSATE SODIUM 2 TABLET: 50; 8.6 TABLET ORAL at 20:59

## 2024-06-02 RX ADMIN — HYDROMORPHONE HYDROCHLORIDE 1 MG: 1 INJECTION, SOLUTION INTRAMUSCULAR; INTRAVENOUS; SUBCUTANEOUS at 20:46

## 2024-06-02 RX ADMIN — ALPRAZOLAM 1 MG: 0.5 TABLET ORAL at 20:43

## 2024-06-02 RX ADMIN — PIPERACILLIN AND TAZOBACTAM 4500 MG: 4; .5 INJECTION, POWDER, FOR SOLUTION INTRAVENOUS at 16:31

## 2024-06-02 RX ADMIN — ROPINIROLE HYDROCHLORIDE 0.5 MG: 0.25 TABLET, FILM COATED ORAL at 20:45

## 2024-06-02 RX ADMIN — DIPHENHYDRAMINE HYDROCHLORIDE 25 MG: 25 CAPSULE ORAL at 13:47

## 2024-06-02 RX ADMIN — TROSPIUM CHLORIDE 20 MG: 20 TABLET, FILM COATED ORAL at 06:48

## 2024-06-02 RX ADMIN — SODIUM CHLORIDE: 9 INJECTION, SOLUTION INTRAVENOUS at 23:04

## 2024-06-02 RX ADMIN — BUDESONIDE 250 MCG: 0.25 INHALANT RESPIRATORY (INHALATION) at 07:22

## 2024-06-02 RX ADMIN — TROSPIUM CHLORIDE 20 MG: 20 TABLET, FILM COATED ORAL at 16:31

## 2024-06-02 RX ADMIN — LEVETIRACETAM 1000 MG: 500 TABLET, FILM COATED ORAL at 20:42

## 2024-06-02 RX ADMIN — TAMSULOSIN HYDROCHLORIDE 0.4 MG: 0.4 CAPSULE ORAL at 09:48

## 2024-06-02 RX ADMIN — SODIUM CHLORIDE, PRESERVATIVE FREE 10 ML: 5 INJECTION INTRAVENOUS at 21:00

## 2024-06-02 RX ADMIN — DICYCLOMINE HYDROCHLORIDE 20 MG: 10 CAPSULE ORAL at 16:31

## 2024-06-02 RX ADMIN — ROPINIROLE HYDROCHLORIDE 0.5 MG: 0.25 TABLET, FILM COATED ORAL at 09:48

## 2024-06-02 RX ADMIN — POTASSIUM CITRATE 10 MEQ: 5 TABLET ORAL at 09:48

## 2024-06-02 RX ADMIN — DIPHENHYDRAMINE HYDROCHLORIDE 25 MG: 25 CAPSULE ORAL at 20:44

## 2024-06-02 RX ADMIN — BUDESONIDE 250 MCG: 0.25 INHALANT RESPIRATORY (INHALATION) at 20:03

## 2024-06-02 RX ADMIN — GABAPENTIN 200 MG: 100 CAPSULE ORAL at 09:48

## 2024-06-02 RX ADMIN — ONDANSETRON 4 MG: 2 INJECTION INTRAMUSCULAR; INTRAVENOUS at 04:11

## 2024-06-02 RX ADMIN — ONDANSETRON 4 MG: 2 INJECTION INTRAMUSCULAR; INTRAVENOUS at 12:35

## 2024-06-02 RX ADMIN — HYDROMORPHONE HYDROCHLORIDE 1 MG: 1 INJECTION, SOLUTION INTRAMUSCULAR; INTRAVENOUS; SUBCUTANEOUS at 08:12

## 2024-06-02 RX ADMIN — HYDROMORPHONE HYDROCHLORIDE 1 MG: 1 INJECTION, SOLUTION INTRAMUSCULAR; INTRAVENOUS; SUBCUTANEOUS at 04:11

## 2024-06-02 RX ADMIN — DICYCLOMINE HYDROCHLORIDE 20 MG: 10 CAPSULE ORAL at 13:47

## 2024-06-02 RX ADMIN — HYDROMORPHONE HYDROCHLORIDE 1 MG: 1 INJECTION, SOLUTION INTRAMUSCULAR; INTRAVENOUS; SUBCUTANEOUS at 16:31

## 2024-06-02 RX ADMIN — PANTOPRAZOLE SODIUM 40 MG: 40 TABLET, DELAYED RELEASE ORAL at 06:48

## 2024-06-02 RX ADMIN — ARFORMOTEROL TARTRATE 15 MCG: 15 SOLUTION RESPIRATORY (INHALATION) at 07:22

## 2024-06-02 RX ADMIN — ARFORMOTEROL TARTRATE 15 MCG: 15 SOLUTION RESPIRATORY (INHALATION) at 20:03

## 2024-06-02 RX ADMIN — QUETIAPINE FUMARATE 200 MG: 200 TABLET, EXTENDED RELEASE ORAL at 20:46

## 2024-06-02 RX ADMIN — DICYCLOMINE HYDROCHLORIDE 20 MG: 10 CAPSULE ORAL at 20:45

## 2024-06-02 RX ADMIN — SODIUM CHLORIDE: 9 INJECTION, SOLUTION INTRAVENOUS at 12:39

## 2024-06-02 RX ADMIN — LEVETIRACETAM 1000 MG: 500 TABLET, FILM COATED ORAL at 09:48

## 2024-06-02 RX ADMIN — TRAZODONE HYDROCHLORIDE 100 MG: 50 TABLET ORAL at 20:45

## 2024-06-02 RX ADMIN — PIPERACILLIN AND TAZOBACTAM 4500 MG: 4; .5 INJECTION, POWDER, FOR SOLUTION INTRAVENOUS at 09:47

## 2024-06-02 RX ADMIN — ENOXAPARIN SODIUM 30 MG: 100 INJECTION SUBCUTANEOUS at 20:50

## 2024-06-02 RX ADMIN — DIPHENHYDRAMINE HYDROCHLORIDE 25 MG: 25 CAPSULE ORAL at 04:09

## 2024-06-02 ASSESSMENT — PAIN DESCRIPTION - FREQUENCY
FREQUENCY: INTERMITTENT
FREQUENCY: CONTINUOUS

## 2024-06-02 ASSESSMENT — PAIN DESCRIPTION - DESCRIPTORS
DESCRIPTORS: SHARP
DESCRIPTORS: THROBBING;TIGHTNESS
DESCRIPTORS: SHARP
DESCRIPTORS: SHARP
DESCRIPTORS: ACHING
DESCRIPTORS: SHARP
DESCRIPTORS: TIGHTNESS
DESCRIPTORS: SHARP
DESCRIPTORS: SHARP

## 2024-06-02 ASSESSMENT — PAIN DESCRIPTION - LOCATION
LOCATION: ABDOMEN
LOCATION: HEAD;ABDOMEN
LOCATION: ABDOMEN
LOCATION: HEAD;LEG;BACK
LOCATION: ABDOMEN
LOCATION: ABDOMEN;BACK
LOCATION: ABDOMEN;BACK
LOCATION: ABDOMEN

## 2024-06-02 ASSESSMENT — PAIN SCALES - GENERAL
PAINLEVEL_OUTOF10: 8
PAINLEVEL_OUTOF10: 6
PAINLEVEL_OUTOF10: 8
PAINLEVEL_OUTOF10: 9
PAINLEVEL_OUTOF10: 4
PAINLEVEL_OUTOF10: 9
PAINLEVEL_OUTOF10: 8
PAINLEVEL_OUTOF10: 7
PAINLEVEL_OUTOF10: 7
PAINLEVEL_OUTOF10: 4

## 2024-06-02 ASSESSMENT — PAIN DESCRIPTION - PAIN TYPE
TYPE: ACUTE PAIN
TYPE: CHRONIC PAIN
TYPE: ACUTE PAIN
TYPE: ACUTE PAIN

## 2024-06-02 ASSESSMENT — PAIN DESCRIPTION - ORIENTATION
ORIENTATION: LOWER;LEFT
ORIENTATION: ANTERIOR
ORIENTATION: LOWER;LEFT
ORIENTATION: RIGHT
ORIENTATION: LEFT;LOWER
ORIENTATION: ANTERIOR
ORIENTATION: RIGHT
ORIENTATION: LEFT;LOWER
ORIENTATION: ANTERIOR

## 2024-06-02 ASSESSMENT — PAIN - FUNCTIONAL ASSESSMENT
PAIN_FUNCTIONAL_ASSESSMENT: PREVENTS OR INTERFERES SOME ACTIVE ACTIVITIES AND ADLS
PAIN_FUNCTIONAL_ASSESSMENT: ACTIVITIES ARE NOT PREVENTED
PAIN_FUNCTIONAL_ASSESSMENT: ACTIVITIES ARE NOT PREVENTED

## 2024-06-02 ASSESSMENT — PAIN DESCRIPTION - ONSET
ONSET: GRADUAL
ONSET: ON-GOING

## 2024-06-02 NOTE — PROGRESS NOTES
0700: Bedside and Verbal shift change report given to VILMA Ch (oncoming nurse) by VILMA Salmon (offgoing nurse). Report included the following information Nurse Handoff Report, Index, Adult Overview, Intake/Output, MAR, and Recent Results.     0730: Pt attempting to get OOB to use the BR. Pt redirected and informed she has a perez that is draining urine. Pt insist she wants to wash up and change her clothes.     0812: Pt medicated for pain per MAR.     0830: Ultrasound at bedside.     Morning medications and assessment completed. Pt assisted to BR to wash up and brush her teeth. Pt assisted back to bed. Bed alarm on.     1000: Pt assisted with changing clothes.    1205: Dr. Weems and Mya at bedside. Verbal order for IVF NS at 100ml/hr received. RBV.     1230: Pt medicated for pain per MAR.      1235: IVF started.     1250: Echo at bedside.     1400: Pt's family at bedside.     1630: Pt medicated for pain per MAR.     1800: Pt's midline dressing changed d/t old drainage noted.     1930: Bedside and Verbal shift change report given to VILMA Gray (oncoming nurse) by VILMA Ch (offgoing nurse). Report included the following information Nurse Handoff Report, Index, ED Encounter Summary, Adult Overview, Intake/Output, MAR, and Recent Results.

## 2024-06-02 NOTE — PROGRESS NOTES
Progress Note    Garrison Buenrostro  53 y.o.      Admit Date: 5/31/2024  Patient Active Problem List   Diagnosis    Lumbar radiculopathy    Migraine headache    Seizures (HCC)    Left renal stone    Pulmonary nodule seen on imaging study    Ureteral colic    Generalized abdominal pain    Anxiety    Moderate episode of recurrent major depressive disorder (HCC)    Respiratory failure (HCC)    Acute hypoxic on chronic hypercapnic respiratory failure (HCC)    Polypharmacy    Left upper quadrant abdominal pain    Ureteral stent present    Acute respiratory failure with hypoxia and hypercapnia (HCC)    ARF (acute renal failure) (HCC)    Single kidney    UTI (urinary tract infection)           Subjective:     Patient still having a lot of distress.  States Steinberg has continued discomfort also having abdominal pain like before.  She  did undergo cystoscopy along with retrograde and had left-sided hydronephrosis with left ureteric stent poorly functional and also had left ureteric stone.. .  Still being changed at this time been taken out.  Right kidney surgically absent due to recurrent stones in the past.  He did undergo CT of the abdomen and pelvis with and without contrast yesterday on the top of her acute renal failure.  Has difficult IV access now has a midline with a single-lumen.  LR is on hold      A comprehensive review of systems was negative except for that written in the History of Present Illness.    Objective:     /76   Pulse (!) 102   Temp 98.3 °F (36.8 °C) (Oral)   Resp 18   Ht 1.626 m (5' 4\")   Wt 109.4 kg (241 lb 3.2 oz)   SpO2 93%   BMI 41.40 kg/m²       Intake/Output Summary (Last 24 hours) at 6/2/2024 1315  Last data filed at 6/2/2024 1124  Gross per 24 hour   Intake 370 ml   Output 2125 ml   Net -1755 ml       Current Facility-Administered Medications   Medication Dose Route Frequency Provider Last Rate Last Admin    diphenhydrAMINE (BENADRYL) capsule 25 mg  25 mg Oral Q6H PRN Lan Rosado,  % sodium chloride infusion   IntraVENous PRN Jane Bassett MD   Stopped at 06/01/24 1902    potassium chloride (KLOR-CON M) extended release tablet 40 mEq  40 mEq Oral PRJane Bhandari MD        Or    potassium bicarb-citric acid (EFFER-K) effervescent tablet 40 mEq  40 mEq Oral PRN Jane Bassett MD        Or    potassium chloride 10 mEq/100 mL IVPB (Peripheral Line)  10 mEq IntraVENous PRN Jane Bassett MD        magnesium sulfate 2000 mg in 50 mL IVPB premix  2,000 mg IntraVENous PRN Jane Bassett MD   1,000 mg at 06/01/24 1842    enoxaparin Sodium (LOVENOX) injection 30 mg  30 mg SubCUTAneous BID Jane Bassett MD   30 mg at 06/02/24 0947    ondansetron (ZOFRAN-ODT) disintegrating tablet 4 mg  4 mg Oral Q8H PRN Jane Bassett MD   4 mg at 06/01/24 1652    Or    ondansetron (ZOFRAN) injection 4 mg  4 mg IntraVENous Q6H PRN Jane Bassett MD   4 mg at 06/02/24 1235    polyethylene glycol (GLYCOLAX) packet 17 g  17 g Oral Daily PRN Jane Bassett MD        acetaminophen (TYLENOL) tablet 650 mg  650 mg Oral Q6H PRN Jane Bassett MD        Or    acetaminophen (TYLENOL) suppository 650 mg  650 mg Rectal Q6H PRN Jane Bassett MD        arformoterol tartrate (BROVANA) nebulizer solution 15 mcg  15 mcg Nebulization BID RT Jane Bassett MD   15 mcg at 06/02/24 0722    budesonide (PULMICORT) nebulizer suspension 250 mcg  0.25 mg Nebulization BID RT Jane Bassett MD   250 mcg at 06/02/24 0722    HYDROmorphone (DILAUDID) injection 1 mg  1 mg IntraVENous Q4H PRN Jane Bassett MD   1 mg at 06/02/24 1230    piperacillin-tazobactam (ZOSYN) 4,500 mg in sodium chloride 0.9 % 100 mL IVPB (mini-bag)  4,500 mg IntraVENous Q8H Edd Roque MD 25 mL/hr at 06/02/24 0947 4,500 mg at 06/02/24 0947    gabapentin (NEURONTIN) capsule 200 mg  200 mg Oral BID Adal Weems MD   200 mg at 06/02/24 0948    potassium citrate (UROCIT-K)

## 2024-06-02 NOTE — PROGRESS NOTES
[facility]  Progress Note    Patient: Garrison Buenrostro Age: 53 y.o. : 1970 MR#: [id number] SSN: [id number]  Date/Time: 2024    Subjective:     Patient is sitting in bed in no apparent distress, awake and alert.  Still has some stent related abdominal pain.      Assessment/Plan:     Possible sepsis, UTI, recent left ureteric stent placement on May 24  Hypoxia  Acute kidney injury  History of anxiety and depression  History of seizures  Obesity Body mass index is 41.37 kg/m².    PLAN  Status post exchange of stent by urology yesterday evening  Continue Zosyn, follow cultures  Continue IV fluids.  Nephrology is following, monitor renal function  Incentive spirometry.  Pulmonary is following  On Keppra  Continue Neurontin  Continue Seroquel and Zoloft  Bowel regimen  I discussed with the patient regarding her pain regimen.  At this time patient insists on continuing the current regimen of IV Dilaudid and states that it is the only regimen that is helping her pain.  I broached with patient regarding starting to taper it down but patient declines at this time.    I discussed care plan with the patient and nurse.  I also discussed with the nephrologist  PT and OT    Disposition-possibly home with home health care on  or       Case discussed with:  [x]Patient  []Family  []Nursing  []Case Management  DVT Prophylaxis:  []Lovenox  []Hep SQ  []SCDs  []Coumadin   []On Heparin gtt    Objective:   VS: BP (!) 106/50   Pulse 100   Temp 97.9 °F (36.6 °C) (Oral)   Resp 18   Ht 1.626 m (5' 4\")   Wt 109.3 kg (241 lb)   SpO2 92%   BMI 41.37 kg/m²    Tmax/24hrs: Temp (24hrs), Av.4 °F (36.9 °C), Min:97.6 °F (36.4 °C), Max:99.2 °F (37.3 °C)    Input/Output:   Intake/Output Summary (Last 24 hours) at 2024 1859  Last data filed at 2024 1734  Gross per 24 hour   Intake 727 ml   Output 2425 ml   Net -1698 ml       General:  Awake, alert, follows command and responds  16 - 34 ml/m2    LA Volume Index MOD A4C 19 16 - 34 ml/m2    Ao Root Index 1.23 cm/m2    Est. RA Pressure 8 mmHg    RVSP 29 mmHg   Comprehensive Metabolic Panel    Collection Time: 06/02/24  1:37 PM   Result Value Ref Range    Sodium 138 136 - 145 mmol/L    Potassium 4.7 3.5 - 5.5 mmol/L    Chloride 108 100 - 111 mmol/L    CO2 25 21 - 32 mmol/L    Anion Gap 5 3.0 - 18 mmol/L    Glucose 113 (H) 74 - 99 mg/dL    BUN 25 (H) 7.0 - 18 MG/DL    Creatinine 2.71 (H) 0.6 - 1.3 MG/DL    BUN/Creatinine Ratio 9 (L) 12 - 20      Est, Glom Filt Rate 21 (L) >60 ml/min/1.73m2    Calcium 8.8 8.5 - 10.1 MG/DL    Total Bilirubin 0.9 0.2 - 1.0 MG/DL    ALT 20 13 - 56 U/L    AST 19 10 - 38 U/L    Alk Phosphatase 76 45 - 117 U/L    Total Protein 7.1 6.4 - 8.2 g/dL    Albumin 3.1 (L) 3.4 - 5.0 g/dL    Globulin 4.0 2.0 - 4.0 g/dL    Albumin/Globulin Ratio 0.8 0.8 - 1.7       Additional Data Reviewed:      Dragon medical dictation software was used for portions of this report. Unintended errors may occur.  Personal Protective Equipment ( face mask ) was used while interacting with the patient        Signed By: Edd Roque MD     June 2, 2024 6:59 PM

## 2024-06-02 NOTE — CONSULTS
[facility]  Pulmonary, Critical Care, and Sleep Medicine  Progress note    Name: Garrison Buenrostro MRN: [id number]   : 1970 Hospital: [facility]   Date: 2024        IMPRESSION:   Acute hypoxemic /hypercapneic respiratory failure, multifactorial from bibasilar atelectasis with possible superimposed chronic microaspiration from GERD, possible obesity hypoventilation/possible underlying TIA, tracheomalacia/EDAC noted on CT chest, possible reactive airways disease, and compensatory from sepsis  Sepsis secondary to urinary tract infection  Left ureteral stone with mild hydronephrosis s/p cystoscopy with stent replacement on  with urology  Nonoliguric ABUNDIO, likely combination of post-obstructive + ATN  Surgically absent right kidney secondary to recurrent stones  Constipation with large stool burden on CT  GERD  PTSD  Anxiety/depression  History of TBI  Sciatica  History of seizures, last reportedly 2 years ago      PLAN:   Oxygen: Supplemental O2 to maintain SpO2 >90-92%. Titrated to room air this afternoon.   Recommend outpatient sleep evaluation for likely underlying TIA  Bronchial hygiene protocol. Encouraged IS, PEP valve.   Bronchodilators: Pulmicort and Brovana, PRN Duoneb  Steroids: No systemic steroids indicated at this time.   Antibiotics: Per primary team for UTI/sepsis. Currently on Zosyn.   Aspiration precautions  HOB greater than 30 degrees.   Bowel regimen per primary team  Daily PPI for GERD  Follow up TTE results.   Outpatient testing - PFT, polysomnogram  Assess home oxygen needs at discharge  OT, PT, OOB and ambulate  DVT prophylaxis per primary team  Healthy weight     Will Follow     Subjective/Interval History:   24   Patient reports her breathing is much improved today. She was just titrated off supplemental O2. Does still note that tightness in her throat when she lies down flat, but otherwise denies any dyspnea. Denies any cough, sputum  Radiologist Recommendation    Result Date: 5/26/2024  1. Left double-J ureteral stent appears appropriately positioned. Previously identified distal ureter stone not confidently identified on today's study with medical device in place. 2. Additional nonobstructing left renal stones are unchanged. 3. Prior right nephrectomy and additional incidental findings as described.      Ultrasound Result (most recent):  US RETROPERITONEAL COMPLETE 06/02/2024    Narrative  EXAM:  Ultrasound retroperitoneal    CLINICAL INDICATION/HISTORY: Acute renal failure. Bilateral flank pain.    COMPARISON: CT 6/1/2024.    TECHNIQUE: Submitted images from a retroperitoneal ultrasound provided for  interpretation.    FINDINGS:    RIGHT: Surgically absent.    LEFT:  The left kidney measures 13.7 cm in length.  There is normal cortical  thickness and echotexture. No solid or cystic renal mass identified.    There is no collecting system dilatation or evidence of obstruction. 0.8 cm  echogenic focus in the interpolar region, consistent with calculus seen on prior  study. No adjacent free fluid or fluid collections.  The proximal ureter does  not appear dilated.      The bladder is empty due to Steinberg's catheter, thus could not be evaluated.    No free fluid is noted in the pelvis.    Impression  1.  No LEFT hydronephrosis. 0.8 cm LEFT kidney stone.  2.  Right kidney surgically absent.  3.  Bladder empty due to Steinberg's catheter.     No results found for this or any previous visit.         High complexity decision making was performed during the evaluation of this patient at high risk for decompensation with multiple organ involvement     Above mentioned total time spent on reviewing the case/medical record/data/notes/EMR/patient examination/documentation/coordinating care with nurse/consultants, exclusive of procedures with complex decision making performed and > 50% time spent in face to face evaluation.    Emily Rodriguez DO

## 2024-06-02 NOTE — PROGRESS NOTES
1945hrs:  Verbal shift change report given to VILMA Salmon (oncoming nurse) by VILMA Ch (offgoing nurse). Report included the following information Nurse Handoff Report, Index, ED Encounter Summary, Adult Overview, Surgery Report, Intake/Output, MAR, Recent Results, Cardiac Rhythm ST, Neuro Assessment, and Event Log.     Pt is off unit to Surgery.  Pt's family is in the room waiting.      Per VILMA Ch, pt's family provided with update regarding pt's status.    2013hrs:    TRANSFER - IN REPORT:  Verbal report received from VILMA Laguerre on Garrison Buenrostro  being received from [facility]/PACU for routine post-op      Report consisted of patient's Situation, Background, Assessment and   Recommendations(SBAR).     Information from the following report(s) Nurse Handoff Report, Index, Adult Overview, Surgery Report, Intake/Output, MAR, Recent Results, Cardiac Rhythm ST, Neuro Assessment, and Event Log was reviewed with the receiving nurse.    Opportunity for questions and clarification was provided.      Assessment completed upon patient's arrival to unit and care assumed.      2100hrs:  Pt arrived to floor via transport personnel x1.    2129hrs:  Pt c/o 8 out 10 headache pain (heavy pressure and pounding).  Pt is unable to receive pain med until closer to 0000hrs.  RN advised MD to be paged.     2130hrs:  Spoke with Dr. Rosado regarding pt's headache px.  Per MD, pt's MAR to be updated.     2154hrs:   MD re-paged for clarification on med order.  RN spoke to Dr. Rosado and was advised One-time IV Fentanyl ordered for pt.      2315hrs:    4 Eyes Skin Assessment   NAME:  Garrison Buenrostro  YOB: 1970  MEDICAL RECORD NUMBER:  [id number]    The patient is being assessed for  Post-Op Surgical    I agree that at least one RN has performed a thorough Head to Toe Skin Assessment on the patient. ALL assessment sites listed below have been assessed.      Areas assessed by both nurses:  Head, Face, Ears, Shoulders, Back, Chest,

## 2024-06-02 NOTE — PERIOP NOTE
TRANSFER - OUT REPORT:    Verbal report given to VILMA Bullock on Garrison Buenrostro  being transferred to [address] [place] [address] for routine progression of patient care       Report consisted of patient's Situation, Background, Assessment and   Recommendations(SBAR).     Information from the following report(s) Nurse Handoff Report, Surgery Report, Intake/Output, MAR, and Cardiac Rhythm sinus tachy  was reviewed with the receiving nurse.           Lines:       Opportunity for questions and clarification was provided.      Patient transported with:  O2 @ 4lpm, Patient-specific medications from Pharmacy, and Tech

## 2024-06-02 NOTE — PLAN OF CARE
Problem: Pain  Goal: Verbalizes/displays adequate comfort level or baseline comfort level  Outcome: Progressing     Problem: Safety - Adult  Goal: Free from fall injury  Outcome: Progressing     Problem: Skin/Tissue Integrity - Adult  Goal: Skin integrity remains intact  Outcome: Progressing  Flowsheets (Taken 6/2/2024 6134)  Skin Integrity Remains Intact: Monitor for areas of redness and/or skin breakdown

## 2024-06-03 PROBLEM — A41.9 SEPSIS WITHOUT ACUTE ORGAN DYSFUNCTION (HCC): Status: ACTIVE | Noted: 2024-06-03

## 2024-06-03 PROBLEM — N12 PYELONEPHRITIS: Status: ACTIVE | Noted: 2024-06-03

## 2024-06-03 LAB
ANION GAP SERPL CALC-SCNC: 6 MMOL/L (ref 3–18)
BASOPHILS # BLD: 0 K/UL (ref 0–0.1)
BASOPHILS NFR BLD: 0 % (ref 0–2)
BUN SERPL-MCNC: 25 MG/DL (ref 7–18)
BUN/CREAT SERPL: 11 (ref 12–20)
CALCIUM SERPL-MCNC: 8.5 MG/DL (ref 8.5–10.1)
CHLORIDE SERPL-SCNC: 110 MMOL/L (ref 100–111)
CO2 SERPL-SCNC: 21 MMOL/L (ref 21–32)
CREAT SERPL-MCNC: 2.38 MG/DL (ref 0.6–1.3)
DIFFERENTIAL METHOD BLD: ABNORMAL
ECHO BSA: 2.22 M2
EOSINOPHIL # BLD: 0 K/UL (ref 0–0.4)
EOSINOPHIL NFR BLD: 0 % (ref 0–5)
ERYTHROCYTE [DISTWIDTH] IN BLOOD BY AUTOMATED COUNT: 14.1 % (ref 11.6–14.5)
GLUCOSE SERPL-MCNC: 108 MG/DL (ref 74–99)
HCT VFR BLD AUTO: 27.6 % (ref 35–45)
HGB BLD-MCNC: 8.4 G/DL (ref 12–16)
IMM GRANULOCYTES # BLD AUTO: 0.1 K/UL (ref 0–0.04)
IMM GRANULOCYTES NFR BLD AUTO: 1 % (ref 0–0.5)
LYMPHOCYTES # BLD: 1.5 K/UL (ref 0.9–3.6)
LYMPHOCYTES NFR BLD: 17 % (ref 21–52)
MAGNESIUM SERPL-MCNC: 2.4 MG/DL (ref 1.6–2.6)
MCH RBC QN AUTO: 30 PG (ref 24–34)
MCHC RBC AUTO-ENTMCNC: 30.4 G/DL (ref 31–37)
MCV RBC AUTO: 98.6 FL (ref 78–100)
MONOCYTES # BLD: 0.8 K/UL (ref 0.05–1.2)
MONOCYTES NFR BLD: 9 % (ref 3–10)
NEUTS SEG # BLD: 6.7 K/UL (ref 1.8–8)
NEUTS SEG NFR BLD: 73 % (ref 40–73)
NRBC # BLD: 0 K/UL (ref 0–0.01)
NRBC BLD-RTO: 0 PER 100 WBC
PLATELET # BLD AUTO: 203 K/UL (ref 135–420)
PMV BLD AUTO: 10.7 FL (ref 9.2–11.8)
POTASSIUM SERPL-SCNC: 4.5 MMOL/L (ref 3.5–5.5)
RBC # BLD AUTO: 2.8 M/UL (ref 4.2–5.3)
SODIUM SERPL-SCNC: 137 MMOL/L (ref 136–145)
WBC # BLD AUTO: 9.1 K/UL (ref 4.6–13.2)

## 2024-06-03 PROCEDURE — 6360000002 HC RX W HCPCS: Performed by: INTERNAL MEDICINE

## 2024-06-03 PROCEDURE — 6370000000 HC RX 637 (ALT 250 FOR IP): Performed by: FAMILY MEDICINE

## 2024-06-03 PROCEDURE — 85025 COMPLETE CBC W/AUTO DIFF WBC: CPT

## 2024-06-03 PROCEDURE — 97116 GAIT TRAINING THERAPY: CPT

## 2024-06-03 PROCEDURE — 83735 ASSAY OF MAGNESIUM: CPT

## 2024-06-03 PROCEDURE — 6370000000 HC RX 637 (ALT 250 FOR IP): Performed by: INTERNAL MEDICINE

## 2024-06-03 PROCEDURE — 2580000003 HC RX 258: Performed by: EMERGENCY MEDICINE

## 2024-06-03 PROCEDURE — 6370000000 HC RX 637 (ALT 250 FOR IP): Performed by: EMERGENCY MEDICINE

## 2024-06-03 PROCEDURE — 97165 OT EVAL LOW COMPLEX 30 MIN: CPT

## 2024-06-03 PROCEDURE — 94761 N-INVAS EAR/PLS OXIMETRY MLT: CPT

## 2024-06-03 PROCEDURE — 2580000003 HC RX 258: Performed by: INTERNAL MEDICINE

## 2024-06-03 PROCEDURE — 80048 BASIC METABOLIC PNL TOTAL CA: CPT

## 2024-06-03 PROCEDURE — 36415 COLL VENOUS BLD VENIPUNCTURE: CPT

## 2024-06-03 PROCEDURE — 93970 EXTREMITY STUDY: CPT | Performed by: SURGERY

## 2024-06-03 PROCEDURE — 97110 THERAPEUTIC EXERCISES: CPT

## 2024-06-03 PROCEDURE — 6360000002 HC RX W HCPCS: Performed by: EMERGENCY MEDICINE

## 2024-06-03 PROCEDURE — 94640 AIRWAY INHALATION TREATMENT: CPT

## 2024-06-03 PROCEDURE — 99231 SBSQ HOSP IP/OBS SF/LOW 25: CPT | Performed by: INTERNAL MEDICINE

## 2024-06-03 PROCEDURE — 1100000003 HC PRIVATE W/ TELEMETRY

## 2024-06-03 PROCEDURE — 94664 DEMO&/EVAL PT USE INHALER: CPT

## 2024-06-03 PROCEDURE — 97162 PT EVAL MOD COMPLEX 30 MIN: CPT

## 2024-06-03 PROCEDURE — 6370000000 HC RX 637 (ALT 250 FOR IP): Performed by: STUDENT IN AN ORGANIZED HEALTH CARE EDUCATION/TRAINING PROGRAM

## 2024-06-03 PROCEDURE — 36410 VNPNXR 3YR/> PHY/QHP DX/THER: CPT

## 2024-06-03 RX ORDER — LOPERAMIDE HYDROCHLORIDE 2 MG/1
2 CAPSULE ORAL 4 TIMES DAILY PRN
Status: DISCONTINUED | OUTPATIENT
Start: 2024-06-03 | End: 2024-06-04 | Stop reason: HOSPADM

## 2024-06-03 RX ORDER — HYDROMORPHONE HYDROCHLORIDE 2 MG/1
2 TABLET ORAL EVERY 4 HOURS PRN
Status: DISCONTINUED | OUTPATIENT
Start: 2024-06-03 | End: 2024-06-04 | Stop reason: HOSPADM

## 2024-06-03 RX ADMIN — SENNOSIDES AND DOCUSATE SODIUM 2 TABLET: 50; 8.6 TABLET ORAL at 09:40

## 2024-06-03 RX ADMIN — ENOXAPARIN SODIUM 30 MG: 100 INJECTION SUBCUTANEOUS at 09:30

## 2024-06-03 RX ADMIN — ENOXAPARIN SODIUM 30 MG: 100 INJECTION SUBCUTANEOUS at 20:45

## 2024-06-03 RX ADMIN — DICYCLOMINE HYDROCHLORIDE 20 MG: 10 CAPSULE ORAL at 14:02

## 2024-06-03 RX ADMIN — ALPRAZOLAM 1 MG: 0.5 TABLET ORAL at 20:44

## 2024-06-03 RX ADMIN — TROSPIUM CHLORIDE 20 MG: 20 TABLET, FILM COATED ORAL at 06:55

## 2024-06-03 RX ADMIN — ROPINIROLE HYDROCHLORIDE 0.5 MG: 0.25 TABLET, FILM COATED ORAL at 09:30

## 2024-06-03 RX ADMIN — LEVETIRACETAM 1000 MG: 500 TABLET, FILM COATED ORAL at 20:40

## 2024-06-03 RX ADMIN — BUPROPION HYDROCHLORIDE 300 MG: 150 TABLET, EXTENDED RELEASE ORAL at 09:40

## 2024-06-03 RX ADMIN — DICYCLOMINE HYDROCHLORIDE 20 MG: 10 CAPSULE ORAL at 17:09

## 2024-06-03 RX ADMIN — DICYCLOMINE HYDROCHLORIDE 20 MG: 10 CAPSULE ORAL at 09:41

## 2024-06-03 RX ADMIN — SERTRALINE 100 MG: 50 TABLET, FILM COATED ORAL at 09:42

## 2024-06-03 RX ADMIN — ONDANSETRON 4 MG: 2 INJECTION INTRAMUSCULAR; INTRAVENOUS at 04:02

## 2024-06-03 RX ADMIN — HYDROMORPHONE HYDROCHLORIDE 1 MG: 1 INJECTION, SOLUTION INTRAMUSCULAR; INTRAVENOUS; SUBCUTANEOUS at 06:54

## 2024-06-03 RX ADMIN — SODIUM CHLORIDE, PRESERVATIVE FREE 10 ML: 5 INJECTION INTRAVENOUS at 20:46

## 2024-06-03 RX ADMIN — BUDESONIDE 250 MCG: 0.25 INHALANT RESPIRATORY (INHALATION) at 07:39

## 2024-06-03 RX ADMIN — TROSPIUM CHLORIDE 20 MG: 20 TABLET, FILM COATED ORAL at 17:09

## 2024-06-03 RX ADMIN — PANTOPRAZOLE SODIUM 40 MG: 40 TABLET, DELAYED RELEASE ORAL at 06:55

## 2024-06-03 RX ADMIN — GABAPENTIN 200 MG: 100 CAPSULE ORAL at 09:30

## 2024-06-03 RX ADMIN — ALPRAZOLAM 1 MG: 0.5 TABLET ORAL at 06:55

## 2024-06-03 RX ADMIN — LEVETIRACETAM 1000 MG: 500 TABLET, FILM COATED ORAL at 09:30

## 2024-06-03 RX ADMIN — TAMSULOSIN HYDROCHLORIDE 0.4 MG: 0.4 CAPSULE ORAL at 10:08

## 2024-06-03 RX ADMIN — LINACLOTIDE 290 MCG: 145 CAPSULE, GELATIN COATED ORAL at 14:03

## 2024-06-03 RX ADMIN — ARFORMOTEROL TARTRATE 15 MCG: 15 SOLUTION RESPIRATORY (INHALATION) at 07:38

## 2024-06-03 RX ADMIN — HYDROMORPHONE HYDROCHLORIDE 1 MG: 1 INJECTION, SOLUTION INTRAMUSCULAR; INTRAVENOUS; SUBCUTANEOUS at 02:18

## 2024-06-03 RX ADMIN — PIPERACILLIN AND TAZOBACTAM 4500 MG: 4; .5 INJECTION, POWDER, FOR SOLUTION INTRAVENOUS at 02:12

## 2024-06-03 RX ADMIN — ROPINIROLE HYDROCHLORIDE 0.5 MG: 0.25 TABLET, FILM COATED ORAL at 20:47

## 2024-06-03 RX ADMIN — QUETIAPINE FUMARATE 200 MG: 200 TABLET, EXTENDED RELEASE ORAL at 20:40

## 2024-06-03 RX ADMIN — DIPHENHYDRAMINE HYDROCHLORIDE 25 MG: 25 CAPSULE ORAL at 03:50

## 2024-06-03 RX ADMIN — HYDROMORPHONE HYDROCHLORIDE 2 MG: 2 TABLET ORAL at 15:42

## 2024-06-03 RX ADMIN — DICYCLOMINE HYDROCHLORIDE 20 MG: 10 CAPSULE ORAL at 20:40

## 2024-06-03 RX ADMIN — GABAPENTIN 200 MG: 100 CAPSULE ORAL at 20:39

## 2024-06-03 RX ADMIN — Medication 1 CAPSULE: at 09:39

## 2024-06-03 ASSESSMENT — PAIN DESCRIPTION - ORIENTATION
ORIENTATION: LEFT
ORIENTATION: LEFT;ANTERIOR

## 2024-06-03 ASSESSMENT — PAIN DESCRIPTION - DESCRIPTORS
DESCRIPTORS: ACHING
DESCRIPTORS: ACHING
DESCRIPTORS: THROBBING

## 2024-06-03 ASSESSMENT — PAIN SCALES - GENERAL
PAINLEVEL_OUTOF10: 6
PAINLEVEL_OUTOF10: 10
PAINLEVEL_OUTOF10: 9
PAINLEVEL_OUTOF10: 0
PAINLEVEL_OUTOF10: 7
PAINLEVEL_OUTOF10: 0
PAINLEVEL_OUTOF10: 0
PAINLEVEL_OUTOF10: 8
PAINLEVEL_OUTOF10: 7
PAINLEVEL_OUTOF10: 0

## 2024-06-03 ASSESSMENT — PAIN SCALES - WONG BAKER
WONGBAKER_NUMERICALRESPONSE: NO HURT
WONGBAKER_NUMERICALRESPONSE: HURTS LITTLE MORE
WONGBAKER_NUMERICALRESPONSE: NO HURT

## 2024-06-03 ASSESSMENT — PAIN DESCRIPTION - PAIN TYPE
TYPE: ACUTE PAIN;CHRONIC PAIN
TYPE: CHRONIC PAIN

## 2024-06-03 ASSESSMENT — PAIN DESCRIPTION - FREQUENCY
FREQUENCY: CONTINUOUS
FREQUENCY: CONTINUOUS

## 2024-06-03 ASSESSMENT — PAIN DESCRIPTION - ONSET
ONSET: ON-GOING
ONSET: ON-GOING

## 2024-06-03 ASSESSMENT — PAIN DESCRIPTION - LOCATION
LOCATION: BACK
LOCATION: ABDOMEN
LOCATION: BACK
LOCATION: ABDOMEN

## 2024-06-03 ASSESSMENT — PAIN - FUNCTIONAL ASSESSMENT
PAIN_FUNCTIONAL_ASSESSMENT: PREVENTS OR INTERFERES SOME ACTIVE ACTIVITIES AND ADLS
PAIN_FUNCTIONAL_ASSESSMENT: PREVENTS OR INTERFERES SOME ACTIVE ACTIVITIES AND ADLS

## 2024-06-03 NOTE — PROGRESS NOTES
Pt's son and daughter-in-law at bedside, pt eating [employer] food brought in by pt's son.    2242: Pt ambulate to the bathroom for personal hygiene, , pt encouraged to utilize call light for staff assistance, she verbalized understating.    0403: Pt requesting for Zofran injection, no nausea or vomiting observed, med administered, call light within pt's reach.

## 2024-06-03 NOTE — CARE COORDINATION
Rolling walker delivered to patients' bedside. Assembled to match the walker in room that PT has been using. Educated patient how to adjust for comfort if needed. Patient verbalizes understanding and signs proof of delivery that is sent to the Main CM office to be uploaded to [employer].

## 2024-06-03 NOTE — PROGRESS NOTES
PHYSICAL THERAPY EVALUATION/DISCHARGE    Patient: Garrison Buenrostro (53 y.o. female)  Date: 6/3/2024  Primary Diagnosis: Shortness of breath [R06.02]  Respiratory failure (HCC) [J96.90]  Flank pain [R10.9]  ABUNDIO (acute kidney injury) (HCC) [N17.9]  Acute respiratory failure with hypoxia (HCC) [J96.01]  Acute respiratory failure with hypoxia and hypercapnia (HCC) [J96.01, J96.02]  Acute hypoxic on chronic hypercapnic respiratory failure (HCC) [J96.01, J96.12]  Procedure(s) (LRB):  CYSTOSCOPY LEFT RETROGRADE PYELOGRAM ,  LEFT DOUBLE J STENT EXCHANGE (Left) 2 Days Post-Op   Precautions: Fall Risk,  ,    PLOF: Independent. Lives with family in 3 story house with 5 BELUAH, bedroom on first floor. Occasional use of cane for ambulation.    ASSESSMENT AND RECOMMENDATIONS:  Patient received in bed, alert, with RN Oral in room, and agreed to PT. Pt performed bed mobility and transfers supervision. Ambulates supervision with RW 100ft in hallway. Mod cueing for gait training with RW to walk within the walker. Pt appears to be at baseline with mobility and gait. Pt restless and fixated on perez removal. Returned to bed at end of session. Educated on need for RN assistance with mobility; verbalized understanding. Call bell in reach. Patient does not require further skilled physical therapy intervention at this level of care.    Further Equipment Recommendations for Discharge: rolling walker    AM-PAC Inpatient Mobility Raw Score : 18    This [facility] score should be considered in conjunction with interdisciplinary team recommendations to determine the most appropriate discharge setting. Patient's social support, diagnosis, medical stability, and prior level of function should also be taken into consideration.    Current research shows that an AM-PAC score of 18 (14 without stairs) or greater is associated with a discharge to the patient's home setting. Based on above AM-PAC score and current functional mobility deficits, it is recommended

## 2024-06-03 NOTE — PROGRESS NOTES
Progress Note  Pulmonary, Critical Care, and Sleep Medicine    Name: Garrison Buenrostro MRN: [id number]   : 1970 Hospital: [facility]   Date: 6/3/2024        IMPRESSION:   Acute hypoxemic /hypercapneic respiratory failure, multifactorial from bibasilar atelectasis with possible superimposed chronic microaspiration from GERD, possible obesity hypoventilation/possible underlying TIA, tracheomalacia/EDAC noted on CT chest, possible reactive airways disease, and compensatory from sepsis, now on RA  Sepsis secondary to urinary tract infection  Left ureteral stone with mild hydronephrosis s/p cystoscopy with stent replacement on  with urology  Nonoliguric ABUNDIO, likely combination of post-obstructive + ATN  Surgically absent right kidney secondary to recurrent stones  Constipation with large stool burden on CT  GERD  PTSD  Anxiety/depression  History of TBI  Sciatica  History of seizures, last reportedly 2 years ago       PLAN:   Oxygen: Supplemental O2 to maintain SpO2 >90-92%. Currently on RA  Recommend outpatient sleep evaluation for likely underlying TIA  Bronchial hygiene protocol. Encouraged IS, PEP valve.   Bronchodilators: Pulmicort and Brovana, PRN Duoneb  Steroids: No systemic steroids indicated at this time.   Antibiotics: Per primary team for UTI/sepsis. Currently on Zosyn.   Aspiration precautions  HOB greater than 30 degrees.   Bowel regimen per primary team  Daily PPI for GERD  TTE results reviewed  Outpatient testing - PFT, polysomnogram  Assess home oxygen needs at discharge  OT, PT, OOB and ambulate  DVT prophylaxis per primary team  Healthy weight   Will arrange for outpatient follow up  No further PCCM recommendations, will S/o case. Please call as needed     Subjective/Interval History:   I have reviewed the flowsheet and previous day’s notes. Reviewed interval history. Discussed management with nursing staff.    Patient is a 53 y.o. female with a history of right nephrectomy for

## 2024-06-03 NOTE — PLAN OF CARE
Problem: Safety - Adult  Goal: Free from fall injury  Outcome: Progressing     Problem: Pain  Goal: Verbalizes/displays adequate comfort level or baseline comfort level  Outcome: Progressing     Problem: Neurosensory - Adult  Goal: Achieves stable or improved neurological status  Outcome: Progressing  Flowsheets (Taken 6/2/2024 1947)  Achieves stable or improved neurological status: Assess for and report changes in neurological status     Problem: Respiratory - Adult  Goal: Achieves optimal ventilation and oxygenation  Outcome: Progressing  Flowsheets (Taken 6/2/2024 1947)  Achieves optimal ventilation and oxygenation:   Assess for changes in respiratory status   Assess for changes in mentation and behavior   Position to facilitate oxygenation and minimize respiratory effort     Problem: Metabolic/Fluid and Electrolytes - Adult  Goal: Electrolytes maintained within normal limits  Outcome: Progressing  Flowsheets (Taken 6/2/2024 1947)  Electrolytes maintained within normal limits:   Monitor labs and assess patient for signs and symptoms of electrolyte imbalances   Administer electrolyte replacement as ordered  Goal: Hemodynamic stability and optimal renal function maintained  Outcome: Progressing  Flowsheets (Taken 6/2/2024 1947)  Hemodynamic stability and optimal renal function maintained:   Monitor labs and assess for signs and symptoms of volume excess or deficit   Monitor intake, output and patient weight     Problem: Discharge Planning  Goal: Discharge to home or other facility with appropriate resources  Outcome: Progressing  Flowsheets (Taken 6/2/2024 1947)  Discharge to home or other facility with appropriate resources: Identify barriers to discharge with patient and caregiver

## 2024-06-03 NOTE — CARE COORDINATION
06/03/24 1723   Readmission Assessment   Number of Days since last admission? 1-7 days   Previous Disposition Home with Family   Who is being Interviewed Patient   What was the patient's/caregiver's perception as to why they think they needed to return back to the hospital? Did not agree to original recommended D/C plan   Did you visit your Primary Care Physician after you left the hospital, before you returned this time? No  (\" No, I came back to the hospital before I could make a follow up appointment. \")   Why weren't you able to visit your PCP? Did not have an appointment   Did you see a specialist, such as Cardiac, Pulmonary, Orthopedic Physician, etc. after you left the hospital? No   Who advised the patient to return to the hospital? Self-referral   Does the patient report anything that got in the way of taking their medications? No   In our efforts to provide the best possible care to you and others like you, can you think of anything that we could have done to help you after you left the hospital the first time, so that you might not have needed to return so soon? Arrange for more help when leaving the hospital;Other (Comment)  (\" I don't feel that I should have been discharged when I was, I made it clear to the doctor, she didn't want to listen to me.\")

## 2024-06-03 NOTE — CARE COORDINATION
Met with Patient at bedside. CM introduces self and explains role. Patient is alert and oriented x 4. Hipaa verified. Patient agrees to complete initial  assessment.     Patient is not medically ready. PICC Placement today, Nephrology and Urology following.  [facility]. Family will transport patient home via POV.      06/03/24 5087   Service Assessment   Patient Orientation Alert and Oriented;Person;Place;Situation;Self   Cognition Alert   History Provided By Patient   Primary Caregiver Self   Accompanied By/Relationship SELF   Support Systems Family Members;/   Patient's Healthcare Decision Maker is: Legal Next of Kin   PCP Verified by CM Yes   Last Visit to PCP Within last 3 months   Prior Functional Level Assistance with the following:;Cooking;Housework;Shopping;Other (see comment);Mobility  (Driving)   Current Functional Level Assistance with the following:;Cooking;Housework;Other (see comment);Mobility;Shopping  (Driving)   Can patient return to prior living arrangement Yes   Ability to make needs known: Good   Family able to assist with home care needs: Yes   Would you like for me to discuss the discharge plan with any other family members/significant others, and if so, who? No   Financial Resources Medicaid   Community Resources Transportation   Social/Functional History   Lives With Family   Type of Home House   Home Layout Multi-level   Home Access Stairs to enter with rails   Entrance Stairs - Number of Steps 6   Entrance Stairs - Rails Both   Bathroom Shower/Tub Tub/Shower unit  (Shower stool)   Bathroom Toilet Standard   Bathroom Equipment   (Shower stool)   Bathroom Accessibility Accessible   Home Equipment Cane  (Shower stool)   Receives Help From Family   ADL Assistance Needs assistance   Bath Independent   Dressing Independent   Grooming Independent   Feeding Independent   Toileting Independent   Homemaking Assistance Independent   Meal Prep  Total   Laundry Total   Vacuuming Total   Cleaning Total   Gardening Total   Yard Work Total   Driving Total   Shopping Total   Homemaking Responsibilities No   Ambulation Assistance Needs assistance   Transfer Assistance Needs assistance   Active  No   Patient's  Info Relies on others and Medicaid transport.   Occupation Unemployed   Discharge Planning   Living Arrangements Family Members   Current Services Prior To Admission Durable Medical Equipment;Transportation   Current DME Prior to Arrival Cane  (Shower stool)   Potential Assistance Needed Durable Medical Equipment;Home Care   Potential DME Needed Walker   DME Ordered? Walker   Potential Assistance Purchasing Medications No   Type of Home Care Services Aide Services;OT;PT;Nursing Services   Patient expects to be discharged to: House   One/Two Story Residence Other (comment)  (Multi level)   Interior Rails Both   Lift Chair Available No   History of falls? 0   Services At/After Discharge   Transition of Care Consult (CM Consult) Home Health;Discharge Planning   Internal Home Health Yes   Services At/After Discharge DME;Home Health;Transport    Resource Information Provided? No   Mode of Transport at Discharge Other (see comment)  (Family will transport patient home.)   Confirm Follow Up Transport Self   Condition of Participation: Discharge Planning   The Plan for Transition of Care is related to the following treatment goals: Patient is not medically ready. PICC Placement today, Nephrology and Urology following. DCP : Home with [facility] and supportive family. Family will transport patient home via POV.   The Patient and/or Patient Representative was provided with a Choice of Provider? Patient   The Patient and/Or Patient Representative agree with the Discharge Plan? Yes   Freedom of Choice list was provided with basic dialogue that supports the patient's individualized plan of care/goals, treatment preferences, and shares the quality data

## 2024-06-03 NOTE — PROGRESS NOTES
Progress Note      Patient: Garrison Buenrostro MRN: [id number]  SSN: [id number]    YOB: 1970  Age: 53 y.o.  Sex: female      Admit Date: 5/31/2024    LOS: 2 days     Assessment:   #1 Solitary left kidney with proximal stone s/p Jj stent (exchagned 1 week later)     Stented 5/25 then exchanged due to ?stent malfunction on 6/1    No change in renal function   No need for Steinberg as long as ok with nephrology     Stone pathway message sent for return for URS outpatient     Will sign off       Signed By: Wood Julian MD     Simi 3, 2024      PAGER:  [phone]  OFFICE:  [phone]  ANSWERING SERVICE   [phone]    Subjective:     No events overnight.     Objective:     Vitals:    06/03/24 0654 06/03/24 0752 06/03/24 0855 06/03/24 1052   BP:  102/69  124/81   Pulse:  99  (!) 106   Resp: 17 18  18   Temp:  97.6 °F (36.4 °C)  97.6 °F (36.4 °C)   TempSrc:  Oral  Oral   SpO2:  93%  93%   Weight:       Height:   1.626 m (5' 4\")         Intake and Output:  Current Shift: 06/03 0701 - 06/03 1900  In: -   Out: 450 [Urine:450]  Last three shifts: 06/01 1901 - 06/03 0700  In: 1087 [P.O.:837; I.V.:250]  Out: 3375 [Urine:3375]    Current Facility-Administered Medications   Medication Dose Route Frequency    diphenhydrAMINE (BENADRYL) capsule 25 mg  25 mg Oral Q6H PRN    0.9 % sodium chloride infusion   IntraVENous Continuous    perflutren lipid microspheres (DEFINITY) injection 2 mL  2 mL IntraVENous Once    sennosides-docusate sodium (SENOKOT-S) 8.6-50 MG tablet 2 tablet  2 tablet Oral Daily    bisacodyl (DULCOLAX) suppository 10 mg  10 mg Rectal Daily PRN    lactobacillus (CULTURELLE) capsule 1 capsule  1 capsule Oral Daily with breakfast    albuterol (PROVENTIL) nebulizer solution 2.5 mg  2.5 mg Nebulization 4x Daily PRN    ALPRAZolam (XANAX) tablet 1 mg  1 mg Oral TID PRN    [Held by provider] atorvastatin (LIPITOR) tablet 40 mg  40 mg Oral Nightly    buPROPion (WELLBUTRIN XL) extended release tablet 300 mg

## 2024-06-03 NOTE — PROGRESS NOTES
Comprehensive Nutrition Assessment      Type and Reason for Visit:  Initial, Positive Nutrition Screen    Nutrition Recommendations/Plan:   Diet as ordered  Continue to monitor tolerance of PO, weight, labs, and plan of care during admission.     Malnutrition Assessment:  Malnutrition Status:  Insufficient data (06/03/24 1101)    Context:  Acute Illness       Nutrition History and Allergies:   Hx of polypharmacy severe depression and anxiety on multiple psych meds, recent left ureteral stent, discharged few days ago from the hospital. Presented in ED with recurrent abdominal pain worsen since DC. Wt hx: 91.6 kg (6/2/23), 84.4 kg (12/16/23), 84.4 kg (5/24/24), 109.4 kg (admission) - significant wt gain noticed~ 30% x 6 months - suspect fluid related. Food allergy - eliz.    Nutrition Assessment:    Admitted for acute hypoxic on chronic hypercapnic resp failure. Positive nutrition screen for wt loss, but wt gain trend noticed per EMR. Attempted to see pt x2, pt unavailable. Per flowsheet record, varies po intake x 3 days: 1-100%. Will continue follow-up on pt per protocols.    Nutrition Related Findings:    Meds: PPI, Zoloft, NS. Pertinent labs: eGFR 24, Glu 108,  A1c - none   Wound Type: None     Last BM (including prior to admit): 05/31/24  Edema: Right lower extremity, Left lower extremity    Current Nutrition Intake & Therapies:    Average Meal Intake: 51-75%  Average Supplements Intake: None Ordered  ADULT DIET; Regular; No Pork; No Pork or Pork Products     Anthropometric Measures:  Height: 162.6 cm (5' 4\")  Ideal Body Weight (IBW): 120 lbs (55 kg)    Admission Body Weight: 109.3 kg (240 lb 15.4 oz)  Current Body Weight:  (eliz),   IBW.    Current BMI (kg/m2):    Usual Body Weight: 91.6 kg (201 lb 15.1 oz)   Weight Adjustment For: No Adjustment  BMI Categories: Obese Class 3 (BMI 40.0 or greater) (based on admission wt)    Estimated Daily Nutrient Needs:  Energy Requirements Based On: Formula  Weight Used for

## 2024-06-03 NOTE — PROGRESS NOTES
conducted an initial consultation and Spiritual Assessment for Garrison Buenrostro, who is a 53 y.o.,female. Patient's Primary Language is: English.   According to the patient's EMR Buddhist Affiliation is: Unknown.     The reason the Patient came to the hospital is:   Patient Active Problem List    Diagnosis Date Noted    Flank pain 06/02/2024    Hypoxia 06/02/2024    Respiratory failure (HCC) 06/01/2024    Acute hypoxic on chronic hypercapnic respiratory failure (HCC) 06/01/2024    Polypharmacy 06/01/2024    Left upper quadrant abdominal pain 06/01/2024    Ureteral stent present 06/01/2024    Acute respiratory failure with hypoxia and hypercapnia (HCC) 06/01/2024    ARF (acute renal failure) ([facility]) 06/01/2024    Single kidney 06/01/2024    UTI (urinary tract infection) 06/01/2024    Generalized abdominal pain 05/26/2024    Anxiety 05/26/2024    Moderate episode of recurrent major depressive disorder ([facility]) 05/26/2024    Ureteral colic 05/24/2024    Left renal stone 01/12/2024    Pulmonary nodule seen on imaging study 01/12/2024    Lumbar radiculopathy 12/29/2023    Migraine headache 12/29/2023    Seizures ([facility]) 12/29/2023        The  provided the following Interventions:  Initiated a relationship of care and support.   Explored issues of thierry, belief, spirituality and Scientology/ritual needs while hospitalized.  Listened empathically.  Provided chaplaincy education concerning Advance Medical Directive.  Provided information about Spiritual Care Services.  Offered prayer and assurance of continued prayers on patient's behalf.   Chart reviewed.    The following outcomes where achieved:  Patient shared limited information about both their medical narrative and spiritual journey/beliefs.   confirmed Patient's Buddhist Affiliation.  Patient processed feeling about current hospitalization.  Patient expressed gratitude for 's visit.    Assessment:  Patient does not have any Scientology/cultural

## 2024-06-03 NOTE — PROGRESS NOTES
[facility] Hospitalist Group  Progress Note    Patient: Garrison Buenrostro Age: 53 y.o. : 1970 MR#: [id number] SSN: [id number]      Subjective/24-hour events:     Remains difficult IV access.  Continues to complain of pain.    Assessment:   Possible sepsis, UTI, recent left ureteric stent placement on May 24  Hypoxia  Acute kidney injury  History of anxiety and depression  History of seizures  Obesity Body mass index is 41.37 kg/m².    Plan:   Order for vascular access team to place line entered.  Continue IVF, management per nephrology.  D/W Dr. Weems via phone.  OK to discontinue perez.  Continue IV antibiotics, de-escalate pending culture results.  Analgesia, bowel regimen.  Will begin tapering IV analgesic therapy.  D/W Urology, Dr. Julian, this AM.  Continued assistance/recommendations appreciated.    Anticipated discharge:  2 days/home    Case discussed with:  [x]Patient  []Family  [x] Nursing  [x]Case Management  DVT Prophylaxis:  [x]Lovenox  []Hep SQ  []SCDs  []Coumadin   []On Heparin gtt []PO anticoagulant    Objective:   VS: /69   Pulse 99   Temp 97.6 °F (36.4 °C) (Oral)   Resp 18   Ht 1.626 m (5' 4\")   Wt 109.3 kg (241 lb)   SpO2 93%   BMI 41.37 kg/m²      Tmax/24hrs: Temp (24hrs), Av.1 °F (36.7 °C), Min:97.6 °F (36.4 °C), Max:98.9 °F (37.2 °C)    Intake/Output Summary (Last 24 hours) at 6/3/2024 0830  Last data filed at 6/3/2024 0408  Gross per 24 hour   Intake 717 ml   Output 1725 ml   Net -1008 ml       Gen:  In NAD.  Lungs: Clear, no wheezes  Effort nonlabored.  CV: RRR.  Abdomen: Soft, NTTP.  Extremities: Warm, no pitting edema or ischemia.  Neuro:  Awake and alert, moves extremities spontaneously.    Current Facility-Administered Medications   Medication Dose Route Frequency    diphenhydrAMINE (BENADRYL) capsule 25 mg  25 mg Oral Q6H PRN    0.9 % sodium chloride infusion   IntraVENous Continuous    perflutren lipid microspheres (DEFINITY) injection 2 mL  2  acetaminophen (TYLENOL) tablet 650 mg  650 mg Oral Q6H PRN    Or    acetaminophen (TYLENOL) suppository 650 mg  650 mg Rectal Q6H PRN    arformoterol tartrate (BROVANA) nebulizer solution 15 mcg  15 mcg Nebulization BID RT    budesonide (PULMICORT) nebulizer suspension 250 mcg  0.25 mg Nebulization BID RT    HYDROmorphone (DILAUDID) injection 1 mg  1 mg IntraVENous Q4H PRN    piperacillin-tazobactam (ZOSYN) 4,500 mg in sodium chloride 0.9 % 100 mL IVPB (mini-bag)  4,500 mg IntraVENous Q8H    gabapentin (NEURONTIN) capsule 200 mg  200 mg Oral BID    rOPINIRole (REQUIP) tablet 0.5 mg  0.5 mg Oral BID        Labs:    Recent Results (from the past 24 hour(s))   Echo (TTE) complete (PRN contrast/bubble/strain/3D)    Collection Time: 06/02/24  1:30 PM   Result Value Ref Range    IVSd 1.0 (A) 0.6 - 0.9 cm    LVIDd 4.3 3.9 - 5.3 cm    LVIDs 3.1 cm    LVOT Diameter 1.9 cm    LVPWd 0.9 0.6 - 0.9 cm    LVOT Peak Gradient 8 mmHg    LVOT Mean Gradient 5 mmHg    LVOT SV 78.5 ml    LVOT Peak Velocity 1.4 m/s    LVOT VTI 27.7 cm    RV Basal Dimension 3.2 cm    RV Free Wall Peak S' 16 cm/s    LA Volume A/L 50 mL    LA Volume A-L A4C 55 (A) 22 - 52 mL    LA Volume A-L A4C 43 22 - 52 mL    LA Volume MOD A2C 50 22 - 52 mL    LA Volume MOD A4C 40 22 - 52 mL    LA Volume BP 46 22 - 52 mL    MV A Velocity 0.98 m/s    MV E Wave Deceleration Time 177.8 ms    MV E Velocity 0.79 m/s    LV E' Lateral Velocity 13 cm/s    LV E' Septal Velocity 12 cm/s    TAPSE 2.6 1.7 cm    TR Peak Gradient 21 mmHg    TR Max Velocity 2.30 m/s    Aortic Root 2.6 cm    Body Surface Area 2.22 m2    Fractional Shortening 2D 28 28 - 44 %    LVIDd Index 2.03 cm/m2    LVIDs Index 1.46 cm/m2    LV RWT Ratio 0.42     LV Mass 2D 132.7 67 - 162 g    LV Mass 2D Index 62.6 43 - 95 g/m2    MV E/A 0.81     E/E' Ratio (Averaged) 6.33     E/E' Lateral 6.08     E/E' Septal 6.58     LA Volume Index BP 22 16 - 34 ml/m2    LA Volume Index A/L 24 16 - 34 mL/m2    LVOT Stroke Volume

## 2024-06-03 NOTE — PROGRESS NOTES
OCCUPATIONAL THERAPY EVALUATION/DISCHARGE    Patient: Garrison Buenrostro (53 y.o. female)  Date: 6/3/2024  Primary Diagnosis: Shortness of breath [R06.02]  Respiratory failure (HCC) [J96.90]  Flank pain [R10.9]  ABUNDIO (acute kidney injury) (HCC) [N17.9]  Acute respiratory failure with hypoxia (HCC) [J96.01]  Acute respiratory failure with hypoxia and hypercapnia (HCC) [J96.01, J96.02]  Acute hypoxic on chronic hypercapnic respiratory failure (HCC) [J96.01, J96.12]  Procedure(s) (LRB):  CYSTOSCOPY LEFT RETROGRADE PYELOGRAM ,  LEFT DOUBLE J STENT EXCHANGE (Left) 2 Days Post-Op   Precautions: Fall Risk, Seizure, Aspiration Risk,  PLOF: Pt was independent with self-care and used a cane prn for functional mobility.      ASSESSMENT AND RECOMMENDATIONS:  Based on the objective data described below, pt presents with no deficits that impede pt function with ADLs, functional transfers, and functional mobility in preparation for selfcare tasks.  Patient reports L shoulder pain, however does not impact ability to perform ADLs as pt still able to don/doff socks. Patient educated on therapeutic exercises pending pain and left all needs met and call bell in reach. HR ranged between 108 bpm and 119bpm.        Maximum therapeutic gains met at current level of care and patient will be discharged from occupational therapy at this time.    Further Equipment Recommendations for Discharge: rolling walker for community reintegration    AMPA: AM-PAC Inpatient Daily Activity Raw Score: 22    At this time and based on an AM-PAC score, no further OT is recommended upon discharge due to patient at baseline functional status for ADLs.  Recommend patient returns to prior setting with prior services.    This AMPA score should be considered in conjunction with interdisciplinary team recommendations to determine the most appropriate discharge setting. Patient's social support, diagnosis, medical stability, and prior level of function should also be

## 2024-06-03 NOTE — PROCEDURES
PICC Line Insertion Procedure Note    Procedure: Insertion of #4 FR/18G Midline    Indications:  Poor Access    Procedure Details   Informed consent was obtained for the procedure, including sedation.  Risks of lung perforation, hemorrhage, and adverse drug reaction were discussed.     #4 FR/18G Midline inserted to the R Cephalic vein per hospital protocol.   Blood return:  yes    Findings:  Catheter inserted to 12 cm, with 0 cm. Exposed. Mid upper arm circumference is 40 cm.  There were no changes to vital signs. Catheter was flushed with 10 cc NS. Patient did tolerate procedure well.    Recommendations:    Date: 6/3/2024   Name: Garrison Buenrostro  YOB: 1970    Procedures

## 2024-06-03 NOTE — CONSULTS
day    sodium chloride flush 0.9 % injection 5-40 mL  5-40 mL IntraVENous PRN    0.9 % sodium chloride infusion   IntraVENous PRN    potassium chloride (KLOR-CON M) extended release tablet 40 mEq  40 mEq Oral PRN    Or    potassium bicarb-citric acid (EFFER-K) effervescent tablet 40 mEq  40 mEq Oral PRN    Or    potassium chloride 10 mEq/100 mL IVPB (Peripheral Line)  10 mEq IntraVENous PRN    magnesium sulfate 2000 mg in 50 mL IVPB premix  2,000 mg IntraVENous PRN    enoxaparin Sodium (LOVENOX) injection 30 mg  30 mg SubCUTAneous BID    ondansetron (ZOFRAN-ODT) disintegrating tablet 4 mg  4 mg Oral Q8H PRN    Or    ondansetron (ZOFRAN) injection 4 mg  4 mg IntraVENous Q6H PRN    polyethylene glycol (GLYCOLAX) packet 17 g  17 g Oral Daily PRN    acetaminophen (TYLENOL) tablet 650 mg  650 mg Oral Q6H PRN    Or    acetaminophen (TYLENOL) suppository 650 mg  650 mg Rectal Q6H PRN    arformoterol tartrate (BROVANA) nebulizer solution 15 mcg  15 mcg Nebulization BID RT    budesonide (PULMICORT) nebulizer suspension 250 mcg  0.25 mg Nebulization BID RT    piperacillin-tazobactam (ZOSYN) 4,500 mg in sodium chloride 0.9 % 100 mL IVPB (mini-bag)  4,500 mg IntraVENous Q8H    gabapentin (NEURONTIN) capsule 200 mg  200 mg Oral BID    rOPINIRole (REQUIP) tablet 0.5 mg  0.5 mg Oral BID       Allergies: Azithromycin, Ketorolac tromethamine, Morphine, Naproxen, Percocet [oxycodone-acetaminophen], and Pregabalin    History reviewed. No pertinent family history.  Social History     Socioeconomic History    Marital status: Unknown     Spouse name: Not on file    Number of children: Not on file    Years of education: Not on file    Highest education level: Not on file   Occupational History    Not on file   Tobacco Use    Smoking status: Never     Passive exposure: Never    Smokeless tobacco: Never   Vaping Use    Vaping Use: Never used   Substance and Sexual Activity    Alcohol use: Never    Drug use: Never    Sexual activity: Not on  tenderness.  No guarding/rigidity bowel sounds normal. No masses,  No organomegaly. NO paradoxical motion   Extremities: normal, atraumatic, no cyanosis or edema.   Pulses: 2+ and symmetric all extremities.   Skin: Skin color, texture, turgor normal. No rashes or lesions. NO clubbing   Lymph nodes: Not examined   Neurologic: Grossly nonfocal         Labs: Results:   Chemistry Recent Labs     05/31/24  2148 06/01/24  1336 06/02/24  0305 06/02/24  1337 06/03/24  0212   GLUCOSE 109*   < > 137* 113* 108*      < > 136 138 137   K 4.4   < > 5.0 4.7 4.5      < > 108 108 110   CO2 27   < > 25 25 21   BUN 17   < > 22* 25* 25*   CREATININE 1.66*   < > 2.70* 2.71* 2.38*   GLOB 4.3*  --  4.3* 4.0  --    ALT 27  --  21 20  --    AST 42*  --  18 19  --     < > = values in this interval not displayed.      CBC w/Diff Recent Labs     06/01/24  1336 06/02/24  0305 06/03/24  0212   WBC 7.8 6.4 9.1   RBC 3.47* 3.33* 2.80*   HGB 10.1* 9.6* 8.4*   HCT 33.5* 31.9* 27.6*    277 203      Microbiology Results       Procedure Component Value Units Date/Time    Culture, Urine [[id number]] Collected: 06/01/24 1345    Order Status: Canceled Specimen: Urine, clean catch     Culture, Urine [[id number]] Collected: 06/01/24 0236    Order Status: Completed Specimen: Urine, clean catch Updated: 06/02/24 1203     Special Requests NO SPECIAL REQUESTS        Culture No growth (<1,000 CFU/ML)       Respiratory Panel, Molecular, with COVID-19 (Restricted: peds pts or suitable admitted adults) [[id number]] Collected: 05/31/24 2337    Order Status: Completed Specimen: Nasopharyngeal Updated: 06/01/24 0238     Adenovirus by PCR Not detected        Coronavirus 229E by PCR Not detected        Coronavirus HKU1 by PCR Not detected        Coronavirus NL63 by PCR Not detected        Coronavirus OC43 by PCR Not detected        SARS-CoV-2, PCR Not detected        Human Metapneumovirus by PCR Not detected        Rhinovirus Enterovirus PCR Not

## 2024-06-04 ENCOUNTER — HOME HEALTH ADMISSION (OUTPATIENT)
Age: 54
End: 2024-06-04

## 2024-06-04 VITALS
OXYGEN SATURATION: 90 % | HEART RATE: 103 BPM | WEIGHT: 241 LBS | BODY MASS INDEX: 41.15 KG/M2 | DIASTOLIC BLOOD PRESSURE: 66 MMHG | SYSTOLIC BLOOD PRESSURE: 109 MMHG | TEMPERATURE: 97.9 F | HEIGHT: 64 IN | RESPIRATION RATE: 17 BRPM

## 2024-06-04 LAB
ANION GAP SERPL CALC-SCNC: 7 MMOL/L (ref 3–18)
BUN SERPL-MCNC: 16 MG/DL (ref 7–18)
BUN/CREAT SERPL: 9 (ref 12–20)
CALCIUM SERPL-MCNC: 8.7 MG/DL (ref 8.5–10.1)
CHLORIDE SERPL-SCNC: 111 MMOL/L (ref 100–111)
CO2 SERPL-SCNC: 23 MMOL/L (ref 21–32)
CREAT SERPL-MCNC: 1.8 MG/DL (ref 0.6–1.3)
GLUCOSE SERPL-MCNC: 80 MG/DL (ref 74–99)
POTASSIUM SERPL-SCNC: 3.7 MMOL/L (ref 3.5–5.5)
SODIUM SERPL-SCNC: 141 MMOL/L (ref 136–145)

## 2024-06-04 PROCEDURE — 80048 BASIC METABOLIC PNL TOTAL CA: CPT

## 2024-06-04 PROCEDURE — 6370000000 HC RX 637 (ALT 250 FOR IP): Performed by: EMERGENCY MEDICINE

## 2024-06-04 PROCEDURE — 6370000000 HC RX 637 (ALT 250 FOR IP): Performed by: INTERNAL MEDICINE

## 2024-06-04 PROCEDURE — 94761 N-INVAS EAR/PLS OXIMETRY MLT: CPT

## 2024-06-04 PROCEDURE — 2580000003 HC RX 258: Performed by: INTERNAL MEDICINE

## 2024-06-04 PROCEDURE — 6370000000 HC RX 637 (ALT 250 FOR IP): Performed by: FAMILY MEDICINE

## 2024-06-04 PROCEDURE — 36415 COLL VENOUS BLD VENIPUNCTURE: CPT

## 2024-06-04 PROCEDURE — 6360000002 HC RX W HCPCS: Performed by: INTERNAL MEDICINE

## 2024-06-04 RX ORDER — HYDROMORPHONE HYDROCHLORIDE 2 MG/1
2 TABLET ORAL EVERY 6 HOURS PRN
Qty: 12 TABLET | Refills: 0 | Status: SHIPPED | OUTPATIENT
Start: 2024-06-04 | End: 2024-06-07

## 2024-06-04 RX ORDER — GABAPENTIN 100 MG/1
200 CAPSULE ORAL 2 TIMES DAILY
Qty: 60 CAPSULE | Refills: 0 | Status: SHIPPED | OUTPATIENT
Start: 2024-06-04 | End: 2024-06-19

## 2024-06-04 RX ORDER — BUPROPION HYDROCHLORIDE 300 MG/1
300 TABLET ORAL DAILY
Qty: 30 TABLET | Refills: 3 | Status: SHIPPED | OUTPATIENT
Start: 2024-06-05

## 2024-06-04 RX ADMIN — HYDROMORPHONE HYDROCHLORIDE 2 MG: 2 TABLET ORAL at 01:31

## 2024-06-04 RX ADMIN — SERTRALINE 100 MG: 50 TABLET, FILM COATED ORAL at 08:31

## 2024-06-04 RX ADMIN — PANTOPRAZOLE SODIUM 40 MG: 40 TABLET, DELAYED RELEASE ORAL at 05:42

## 2024-06-04 RX ADMIN — SODIUM CHLORIDE, PRESERVATIVE FREE 10 ML: 5 INJECTION INTRAVENOUS at 08:32

## 2024-06-04 RX ADMIN — BUPROPION HYDROCHLORIDE 300 MG: 150 TABLET, EXTENDED RELEASE ORAL at 08:32

## 2024-06-04 RX ADMIN — TROSPIUM CHLORIDE 20 MG: 20 TABLET, FILM COATED ORAL at 05:42

## 2024-06-04 RX ADMIN — ENOXAPARIN SODIUM 30 MG: 100 INJECTION SUBCUTANEOUS at 08:31

## 2024-06-04 RX ADMIN — SENNOSIDES AND DOCUSATE SODIUM 2 TABLET: 50; 8.6 TABLET ORAL at 08:31

## 2024-06-04 RX ADMIN — SODIUM CHLORIDE: 9 INJECTION, SOLUTION INTRAVENOUS at 10:40

## 2024-06-04 RX ADMIN — ROPINIROLE HYDROCHLORIDE 0.5 MG: 0.25 TABLET, FILM COATED ORAL at 08:32

## 2024-06-04 RX ADMIN — LINACLOTIDE 290 MCG: 145 CAPSULE, GELATIN COATED ORAL at 08:32

## 2024-06-04 RX ADMIN — TAMSULOSIN HYDROCHLORIDE 0.4 MG: 0.4 CAPSULE ORAL at 08:32

## 2024-06-04 RX ADMIN — DICYCLOMINE HYDROCHLORIDE 20 MG: 10 CAPSULE ORAL at 08:32

## 2024-06-04 RX ADMIN — DICYCLOMINE HYDROCHLORIDE 20 MG: 10 CAPSULE ORAL at 12:25

## 2024-06-04 RX ADMIN — HYDROMORPHONE HYDROCHLORIDE 2 MG: 2 TABLET ORAL at 05:42

## 2024-06-04 RX ADMIN — Medication 1 CAPSULE: at 08:31

## 2024-06-04 RX ADMIN — GABAPENTIN 200 MG: 100 CAPSULE ORAL at 08:31

## 2024-06-04 RX ADMIN — LEVETIRACETAM 1000 MG: 500 TABLET, FILM COATED ORAL at 08:31

## 2024-06-04 ASSESSMENT — PAIN DESCRIPTION - DESCRIPTORS: DESCRIPTORS: TENDER;THROBBING;ACHING

## 2024-06-04 ASSESSMENT — PAIN DESCRIPTION - LOCATION
LOCATION: BACK
LOCATION: BACK;LEG

## 2024-06-04 ASSESSMENT — PAIN SCALES - GENERAL
PAINLEVEL_OUTOF10: 8
PAINLEVEL_OUTOF10: 8
PAINLEVEL_OUTOF10: 0

## 2024-06-04 ASSESSMENT — PAIN DESCRIPTION - PAIN TYPE: TYPE: ACUTE PAIN

## 2024-06-04 ASSESSMENT — PAIN DESCRIPTION - ORIENTATION: ORIENTATION: LEFT;MID

## 2024-06-04 NOTE — PROGRESS NOTES
Diarrhea; has received zosyn- will order stool for c.diff and prn imodium once sample is collected

## 2024-06-04 NOTE — HOME CARE
Received home health referral for [facility] for the following disciplines  (SN / PT / OT / HHA).  Discharge order noted for today.    Spoke with patient's dtr via phone.  Explained home health care services and routines.  Demographics verified including insurance, phone and address confirmed.  Answered all questions to the best of this writer's scope of practice.  Caregivers available family as primary.  Orders noted and arranged and sent to central intake and scheduling.       ---   RAYMOND Peace [facility] Home Care Liaison  ]

## 2024-06-04 NOTE — PROGRESS NOTES
Progress Note    Garrison Buenrostro  53 y.o.      Admit Date: 5/31/2024  Patient Active Problem List   Diagnosis    Lumbar radiculopathy    Migraine headache    Seizures (HCC)    Left renal stone    Pulmonary nodule seen on imaging study    Ureteral colic    Generalized abdominal pain    Anxiety    Moderate episode of recurrent major depressive disorder (HCC)    Respiratory failure (HCC)    Acute hypoxic on chronic hypercapnic respiratory failure (HCC)    Polypharmacy    Left upper quadrant abdominal pain    Ureteral stent present    Acute respiratory failure with hypoxia and hypercapnia (HCC)    ARF (acute renal failure) (HCC)    Single kidney    UTI (urinary tract infection)    Flank pain    Hypoxia    Sepsis without acute organ dysfunction (HCC)    Pyelonephritis           Subjective:     Patient feels good ,no new complaint pain is under control., Steinberg  Catheter was taken out.  Making urine without any problem did receive IV fluid and well-hydrated.  Renal function has started and continued  improving.  Has history of recurrent kidney stones with recent ureteric stone in the left ureter with obstruction required urological procedure.  Has single kidney.  Right kidney was taken out after numerous lithotripsy and complication of kidney stones      A comprehensive review of systems was negative except for that written in the History of Present Illness.    Objective:     /66   Pulse (!) 103   Temp 97.9 °F (36.6 °C) (Oral)   Resp 17   Ht 1.626 m (5' 4\")   Wt 109.3 kg (241 lb)   SpO2 90%   BMI 41.37 kg/m²       Intake/Output Summary (Last 24 hours) at 6/4/2024 1127  Last data filed at 6/4/2024 0800  Gross per 24 hour   Intake 580 ml   Output 551 ml   Net 29 ml       Current Facility-Administered Medications   Medication Dose Route Frequency Provider Last Rate Last Admin    HYDROmorphone (DILAUDID) tablet 2 mg  2 mg Oral Q4H PRN Aime Best MD   2 mg at 06/04/24 0542    loperamide (IMODIUM) capsule 2 mg

## 2024-06-04 NOTE — CARE COORDINATION
06/04/24 0905   /Social Work Whiteboard Notes   /Social Work Whiteboard RED 06/04/2024 When medically ready Patient dispo will be home with [facility] and family. Per Vicenta liaison for [facility], they are following patient at discharge. Pleas contact [facility] asap if patient needs IV abx when discharge so [facility] will have time to get ABX ordered per Vicenta. [facility]       Discharge order noted for today. Orders received. No other CM needs identified at this time. Case management remains available as needed.      Claire Taylor BSW   Case Management

## 2024-06-04 NOTE — PROGRESS NOTES
Patient discharged to home. All belongings returned, all medication administered as ordered, all lines removed. Discharge education completed with patient and family member, all questions and concerns addressed. Instructed patient and family member about scheduled follow up doctor visits and all the new medication prescribed. Patient accompanied to the vehicle via wheelchair. Transported home via car with family member.

## 2024-06-04 NOTE — PLAN OF CARE
Problem: Discharge Planning  Goal: Discharge to home or other facility with appropriate resources  Outcome: Adequate for Discharge     Problem: Pain  Goal: Verbalizes/displays adequate comfort level or baseline comfort level  Outcome: Adequate for Discharge     Problem: Safety - Adult  Goal: Free from fall injury  Outcome: Adequate for Discharge     Problem: Cardiovascular - Adult  Goal: Maintains optimal cardiac output and hemodynamic stability  Outcome: Adequate for Discharge  Flowsheets (Taken 6/4/2024 0800)  Maintains optimal cardiac output and hemodynamic stability: Monitor blood pressure and heart rate     Problem: Skin/Tissue Integrity - Adult  Goal: Skin integrity remains intact  Outcome: Adequate for Discharge  Flowsheets (Taken 6/4/2024 0800)  Skin Integrity Remains Intact: Monitor for areas of redness and/or skin breakdown     Problem: Gastrointestinal - Adult  Goal: Minimal or absence of nausea and vomiting  Outcome: Adequate for Discharge     Problem: Musculoskeletal - Adult  Goal: Return mobility to safest level of function  Outcome: Adequate for Discharge

## 2024-06-04 NOTE — PROGRESS NOTES
2053  Notified Dr Kat of Pt. Multiple loose stool.  MD ordered to collect stool for c-diff.    Pt educated on stool sample and given hat.    2300  unable to collect stool., Pt voided in hat.    0300  No loose stool at this time.    0500  Pt resting comfortably in bed, not in distress.    0630  Pt able to rest and sleep well throughout the shift.    Verbal and bedside Shift changed report given to VIMLA Villagomez (oncoming RN) on Pt. Condition. Report consisted of patient’s Situation, History, Activities, intake/output,  Background, Assessment and Recommendations(SBAR).     Information from the following report(s) Kardex, order Summary, Lab results and MAR was reviewed with the receiving nurse.    Opportunity for questions and clarification was provided.

## 2024-06-04 NOTE — PROGRESS NOTES
Infectious Disease progress Note        Reason:sepsis, complicated UTI/left kidney pyelonephritis    Current abx Prior abx   Piperacillin/tazobactam since 5/31      Lines:       Assessment :  53 y.o. female with history of recent left ureteral stent,  polypharmacy, severe depression, anxiety on multiple psych meds presented to [facility] on 5/31/2024 with recurrent abdominal pain.    admitted to [facility] 5/24-5/27/24 with ureteric colic.   Stent was placed.  UA and urine culture were collected. Negative urine culture 5/24/24    Now with hypercapnic respiratory failure on admission, tachycardia,  tachypnea on presentation, persistent left flank pain    I agree that initial clinical presentation is concerning for sepsis due to left kidney pyelonephritis.    However after obtaining detailed history, exam, review of available imaging studies/intraoperative findings; clinical suspicion of sepsis is low    Status post left ureteral stent exchange on 6/1/2024.  No purulent urine noted intraoperatively.  Urine culture 6/1/2024-no growth    Left flank pain could be due to stent irritation- improved    Acute kidney injury: Likely secondary to volume depletion.  Nephrology follow-up appreciated    Acute hypercapnic respiratory failure: Likely multifactorial-obesity hypoventilation syndrome, opiates related, atelectasis.  Pulmonary follow-up appreciated    Mx complicated due to lack of iv access- difficult iv access candidate    Clinically stable off antibiotics    Recommendations:     Monitor off antibiotics  Follow-up nephrology recommendations  Follow-up pulmonary recommendations  Follow-up urology recommendations regarding stent removal  Pain management per primary team  6.  Discharge planning per primary team       Above plan was discussed in details with patient, RN and dr Best. Please call me if any further questions or concerns. Will continue to participate in the care of this patient.  HPI:        Patient denies any fever,  exposure: Never    Smokeless tobacco: Never   Vaping Use    Vaping Use: Never used   Substance and Sexual Activity    Alcohol use: Never    Drug use: Never    Sexual activity: Not on file   Other Topics Concern    Not on file   Social History Narrative    Not on file     Social Determinants of Health     Financial Resource Strain: Not on file   Food Insecurity: No Food Insecurity (2024)    Hunger Vital Sign     Worried About Running Out of Food in the Last Year: Never true     Ran Out of Food in the Last Year: Never true   Transportation Needs: No Transportation Needs (2024)    PRAPARE - Transportation     Lack of Transportation (Medical): No     Lack of Transportation (Non-Medical): No   Physical Activity: Not on file   Stress: Not on file   Social Connections: Not on file   Intimate Partner Violence: Not on file   Housing Stability: Low Risk  (2024)    Housing Stability Vital Sign     Unable to Pay for Housing in the Last Year: No     Number of Places Lived in the Last Year: 2     Unstable Housing in the Last Year: No     Social History     Tobacco Use   Smoking Status Never    Passive exposure: Never   Smokeless Tobacco Never        Temp (24hrs), Av °F (36.7 °C), Min:97.6 °F (36.4 °C), Max:98.5 °F (36.9 °C)    /74   Pulse 95   Temp 97.6 °F (36.4 °C) (Oral)   Resp 17   Ht 1.626 m (5' 4\")   Wt 109.3 kg (241 lb)   SpO2 93%   BMI 41.37 kg/m²     ROS: 12 point review of systems obtained details.  Pertinent positive as mentioned in HPI, otherwise negative    Physical Exam:      General:  Laying on the bed, alert, in no apparent distress   Head:  Normocephalic, without obvious abnormality, atraumatic.   Eyes:  ANicteric,    Nose: Nares normal. Mucosa normal. No drainage or sinus tenderness.    Throat: Lips, mucosa, and tongue normal. Teeth and gums normal    Neck: Supple, symmetrical, trachea midline, no adenopathy, thyroid: no enlargment/tenderness/nodules    Back:   Symmetric   Lungs:

## 2024-06-06 ENCOUNTER — HOME CARE VISIT (OUTPATIENT)
Age: 54
End: 2024-06-06

## 2024-06-07 ENCOUNTER — HOME CARE VISIT (OUTPATIENT)
Age: 54
End: 2024-06-07

## 2024-06-07 LAB
BACTERIA SPEC CULT: NORMAL
BACTERIA SPEC CULT: NORMAL
SERVICE CMNT-IMP: NORMAL
SERVICE CMNT-IMP: NORMAL

## 2024-06-09 ENCOUNTER — HOME CARE VISIT (OUTPATIENT)
Age: 54
End: 2024-06-09

## 2024-06-10 ENCOUNTER — HOME CARE VISIT (OUTPATIENT)
Age: 54
End: 2024-06-10

## 2024-06-12 ENCOUNTER — HOME CARE VISIT (OUTPATIENT)
Age: 54
End: 2024-06-12

## 2024-06-13 ENCOUNTER — HOME CARE VISIT (OUTPATIENT)
Age: 54
End: 2024-06-13

## 2024-06-18 ENCOUNTER — HOME CARE VISIT (OUTPATIENT)
Age: 54
End: 2024-06-18

## 2024-06-20 ENCOUNTER — APPOINTMENT (OUTPATIENT)
Facility: HOSPITAL | Age: 54
End: 2024-06-20
Payer: MEDICAID

## 2024-06-20 ENCOUNTER — HOSPITAL ENCOUNTER (EMERGENCY)
Facility: HOSPITAL | Age: 54
Discharge: HOME | End: 2024-06-20
Attending: STUDENT IN AN ORGANIZED HEALTH CARE EDUCATION/TRAINING PROGRAM
Payer: MEDICAID

## 2024-06-20 VITALS
HEIGHT: 64 IN | HEART RATE: 100 BPM | BODY MASS INDEX: 37.56 KG/M2 | RESPIRATION RATE: 18 BRPM | SYSTOLIC BLOOD PRESSURE: 128 MMHG | DIASTOLIC BLOOD PRESSURE: 70 MMHG | TEMPERATURE: 98.2 F | WEIGHT: 220 LBS | OXYGEN SATURATION: 98 %

## 2024-06-20 DIAGNOSIS — N28.89 URETERITIS: ICD-10-CM

## 2024-06-20 DIAGNOSIS — R10.9 FLANK PAIN: Primary | ICD-10-CM

## 2024-06-20 LAB
ANION GAP SERPL CALC-SCNC: 2 MMOL/L (ref 3–18)
APPEARANCE UR: ABNORMAL
BASOPHILS # BLD: 0 K/UL (ref 0–0.1)
BASOPHILS NFR BLD: 0 % (ref 0–2)
BILIRUB UR QL: NEGATIVE
BUN SERPL-MCNC: 9 MG/DL (ref 7–18)
BUN/CREAT SERPL: 10 (ref 12–20)
CALCIUM SERPL-MCNC: 9.8 MG/DL (ref 8.5–10.1)
CHLORIDE SERPL-SCNC: 112 MMOL/L (ref 100–111)
CO2 SERPL-SCNC: 29 MMOL/L (ref 21–32)
COLOR UR: YELLOW
CREAT SERPL-MCNC: 0.92 MG/DL (ref 0.6–1.3)
DIFFERENTIAL METHOD BLD: ABNORMAL
ECHO BSA: 2.12 M2
EOSINOPHIL # BLD: 0.3 K/UL (ref 0–0.4)
EOSINOPHIL NFR BLD: 5 % (ref 0–5)
EPITH CASTS URNS QL MICRO: NORMAL /LPF (ref 0–5)
ERYTHROCYTE [DISTWIDTH] IN BLOOD BY AUTOMATED COUNT: 13.9 % (ref 11.6–14.5)
GLUCOSE SERPL-MCNC: 98 MG/DL (ref 74–99)
GLUCOSE UR STRIP.AUTO-MCNC: NEGATIVE MG/DL
HCT VFR BLD AUTO: 30.9 % (ref 35–45)
HGB BLD-MCNC: 9.9 G/DL (ref 12–16)
HGB UR QL STRIP: ABNORMAL
IMM GRANULOCYTES # BLD AUTO: 0 K/UL (ref 0–0.04)
IMM GRANULOCYTES NFR BLD AUTO: 0 % (ref 0–0.5)
KETONES UR QL STRIP.AUTO: NEGATIVE MG/DL
LEUKOCYTE ESTERASE UR QL STRIP.AUTO: ABNORMAL
LYMPHOCYTES # BLD: 1.4 K/UL (ref 0.9–3.6)
LYMPHOCYTES NFR BLD: 30 % (ref 21–52)
MCH RBC QN AUTO: 29.8 PG (ref 24–34)
MCHC RBC AUTO-ENTMCNC: 32 G/DL (ref 31–37)
MCV RBC AUTO: 93.1 FL (ref 78–100)
MONOCYTES # BLD: 0.3 K/UL (ref 0.05–1.2)
MONOCYTES NFR BLD: 7 % (ref 3–10)
NEUTS SEG # BLD: 2.8 K/UL (ref 1.8–8)
NEUTS SEG NFR BLD: 58 % (ref 40–73)
NITRITE UR QL STRIP.AUTO: NEGATIVE
NRBC # BLD: 0 K/UL (ref 0–0.01)
NRBC BLD-RTO: 0 PER 100 WBC
OTHER: NORMAL
PH UR STRIP: 6.5 (ref 5–8)
PLATELET # BLD AUTO: 201 K/UL (ref 135–420)
PMV BLD AUTO: 10.2 FL (ref 9.2–11.8)
POTASSIUM SERPL-SCNC: 3.7 MMOL/L (ref 3.5–5.5)
PROT UR STRIP-MCNC: 100 MG/DL
RBC # BLD AUTO: 3.32 M/UL (ref 4.2–5.3)
RBC #/AREA URNS HPF: NORMAL /HPF (ref 0–5)
SODIUM SERPL-SCNC: 143 MMOL/L (ref 136–145)
SP GR UR REFRACTOMETRY: 1.01 (ref 1–1.03)
UROBILINOGEN UR QL STRIP.AUTO: 0.2 EU/DL (ref 0.2–1)
WBC # BLD AUTO: 4.8 K/UL (ref 4.6–13.2)
WBC URNS QL MICRO: POSITIVE /HPF (ref 0–4)

## 2024-06-20 PROCEDURE — 74176 CT ABD & PELVIS W/O CONTRAST: CPT

## 2024-06-20 PROCEDURE — 87086 URINE CULTURE/COLONY COUNT: CPT

## 2024-06-20 PROCEDURE — 99284 EMERGENCY DEPT VISIT MOD MDM: CPT

## 2024-06-20 PROCEDURE — 6360000002 HC RX W HCPCS: Performed by: PHYSICIAN ASSISTANT

## 2024-06-20 PROCEDURE — 85025 COMPLETE CBC W/AUTO DIFF WBC: CPT

## 2024-06-20 PROCEDURE — 2580000003 HC RX 258: Performed by: PHYSICIAN ASSISTANT

## 2024-06-20 PROCEDURE — 96374 THER/PROPH/DIAG INJ IV PUSH: CPT

## 2024-06-20 PROCEDURE — 81001 URINALYSIS AUTO W/SCOPE: CPT

## 2024-06-20 PROCEDURE — 80048 BASIC METABOLIC PNL TOTAL CA: CPT

## 2024-06-20 PROCEDURE — 96376 TX/PRO/DX INJ SAME DRUG ADON: CPT

## 2024-06-20 PROCEDURE — 96375 TX/PRO/DX INJ NEW DRUG ADDON: CPT

## 2024-06-20 PROCEDURE — 93971 EXTREMITY STUDY: CPT

## 2024-06-20 RX ORDER — HYDROMORPHONE HYDROCHLORIDE 1 MG/ML
1 INJECTION, SOLUTION INTRAMUSCULAR; INTRAVENOUS; SUBCUTANEOUS
Status: COMPLETED | OUTPATIENT
Start: 2024-06-20 | End: 2024-06-20

## 2024-06-20 RX ORDER — DIPHENHYDRAMINE HCL 25 MG
25 TABLET ORAL EVERY 6 HOURS PRN
Qty: 30 TABLET | Refills: 0 | Status: SHIPPED | OUTPATIENT
Start: 2024-06-20 | End: 2024-07-20

## 2024-06-20 RX ORDER — DIPHENHYDRAMINE HYDROCHLORIDE 50 MG/ML
12.5 INJECTION INTRAMUSCULAR; INTRAVENOUS
Status: COMPLETED | OUTPATIENT
Start: 2024-06-20 | End: 2024-06-20

## 2024-06-20 RX ORDER — HYDROCODONE BITARTRATE AND ACETAMINOPHEN 5; 325 MG/1; MG/1
1 TABLET ORAL EVERY 6 HOURS PRN
Qty: 12 TABLET | Refills: 0 | Status: SHIPPED | OUTPATIENT
Start: 2024-06-20 | End: 2024-06-23

## 2024-06-20 RX ORDER — DIPHENHYDRAMINE HYDROCHLORIDE 50 MG/ML
25 INJECTION INTRAMUSCULAR; INTRAVENOUS
Status: COMPLETED | OUTPATIENT
Start: 2024-06-20 | End: 2024-06-20

## 2024-06-20 RX ORDER — CEFPODOXIME PROXETIL 100 MG/1
100 TABLET, FILM COATED ORAL 2 TIMES DAILY
Qty: 20 TABLET | Refills: 0 | Status: SHIPPED | OUTPATIENT
Start: 2024-06-20 | End: 2024-06-30

## 2024-06-20 RX ADMIN — HYDROMORPHONE HYDROCHLORIDE 0.5 MG: 1 INJECTION, SOLUTION INTRAMUSCULAR; INTRAVENOUS; SUBCUTANEOUS at 23:15

## 2024-06-20 RX ADMIN — DIPHENHYDRAMINE HYDROCHLORIDE 12.5 MG: 50 INJECTION, SOLUTION INTRAMUSCULAR; INTRAVENOUS at 21:04

## 2024-06-20 RX ADMIN — HYDROMORPHONE HYDROCHLORIDE 1 MG: 1 INJECTION, SOLUTION INTRAMUSCULAR; INTRAVENOUS; SUBCUTANEOUS at 21:05

## 2024-06-20 RX ADMIN — WATER 1000 MG: 1 INJECTION INTRAMUSCULAR; INTRAVENOUS; SUBCUTANEOUS at 19:48

## 2024-06-20 RX ADMIN — DIPHENHYDRAMINE HYDROCHLORIDE 25 MG: 50 INJECTION, SOLUTION INTRAMUSCULAR; INTRAVENOUS at 18:49

## 2024-06-20 RX ADMIN — HYDROMORPHONE HYDROCHLORIDE 1 MG: 1 INJECTION, SOLUTION INTRAMUSCULAR; INTRAVENOUS; SUBCUTANEOUS at 18:49

## 2024-06-20 ASSESSMENT — ENCOUNTER SYMPTOMS
BACK PAIN: 0
WHEEZING: 0
SHORTNESS OF BREATH: 0
VOMITING: 0
RHINORRHEA: 0
STRIDOR: 0
EYE DISCHARGE: 0
NAUSEA: 0
COUGH: 0
EYE REDNESS: 0
SORE THROAT: 0
ABDOMINAL PAIN: 1

## 2024-06-20 ASSESSMENT — PAIN - FUNCTIONAL ASSESSMENT: PAIN_FUNCTIONAL_ASSESSMENT: 0-10

## 2024-06-20 ASSESSMENT — PAIN DESCRIPTION - LOCATION
LOCATION: ABDOMEN
LOCATION: LEG
LOCATION: ABDOMEN

## 2024-06-20 ASSESSMENT — PAIN SCALES - GENERAL
PAINLEVEL_OUTOF10: 10
PAINLEVEL_OUTOF10: 9
PAINLEVEL_OUTOF10: 7
PAINLEVEL_OUTOF10: 8
PAINLEVEL_OUTOF10: 7

## 2024-06-20 ASSESSMENT — PAIN DESCRIPTION - ORIENTATION
ORIENTATION: LEFT
ORIENTATION: LEFT

## 2024-06-20 NOTE — ED PROVIDER NOTES
EMERGENCY DEPARTMENT HISTORY AND PHYSICAL EXAM    Date: 6/20/2024  Patient Name: Garrison Buenrostro    History of Presenting Illness     Chief Complaint   Patient presents with    Leg Pain    stent replacement         History Provided By: patient     Chief Complaint: flank pain, ureteral stent in place, leg pain   Duration: few days  Timing:  acute  Location: L flank, L abdomen, LLE   Quality: sharp  Severity: moderate to severe  Modifying Factors: gabapentin has not helped  Associated Symptoms: flank pain, dysuria, urinary frequency, hematuria, abdominal pain, flank pain, leg pain      Additional History (Context): Garrison Buenrostro is a 53 y.o. female with past medical history seizures PTSD right nephrectomy and kidney stones with current ureteral stent in place who presents with complaints of worsening left flank pain radiating to the left lower abdomen for the past few days.  The patient was to the hospital at the end of May.  She had a double-J ureteral stent placed and was supposed to follow-up with urology to have it removed.  Patient states urology recommended she first see her primary care physician where they would see her in the office.  Patient states her primary care physician will not see her because she no showed it to an appointment however she states the no-show was due to her hospitalization.  The patient is reporting increased left flank pain radiating around to the left lower abdomen for the past several days.  She also reports some left leg pain and swelling for the past few days.  Patient does not take any anticoagulants at this time.  She denies fever and chills.  Denies chest pain shortness of breath.  No other complaints reported at this time    PCP: No primary care provider on file.    No current facility-administered medications for this encounter.     Current Outpatient Medications   Medication Sig Dispense Refill    HYDROcodone-acetaminophen (NORCO) 5-325 MG per tablet Take 1 tablet by mouth

## 2024-06-20 NOTE — ED TRIAGE NOTES
Patient presents to the ed with left leg pain and needing stents replaced. Pt states that she had stents placed the 1st of June and the stents were scheduled to be removed last Tuesday. Patient states that she was advised that they wouldn't replace her stents until she was evaluated by her PCP, however, patient has been unable to get an appointment with that PCP and she has been looking for another PCP, however, has been unable to find one. Pt presents today with extreme left leg pain. Pt only has her left kidney the right kidney was removed.

## 2024-06-21 RX ORDER — HYDROCODONE BITARTRATE AND ACETAMINOPHEN 7.5; 325 MG/1; MG/1
1 TABLET ORAL 2 TIMES DAILY
Qty: 60 TABLET | Refills: 0 | Status: SHIPPED | OUTPATIENT
Start: 2024-06-21 | End: 2024-07-21

## 2024-06-23 LAB
BACTERIA SPEC CULT: ABNORMAL
BACTERIA SPEC CULT: ABNORMAL
CC UR VC: ABNORMAL
SERVICE CMNT-IMP: ABNORMAL

## 2024-06-27 ENCOUNTER — APPOINTMENT (OUTPATIENT)
Facility: HOSPITAL | Age: 54
End: 2024-06-27
Payer: MEDICAID

## 2024-06-27 ENCOUNTER — HOSPITAL ENCOUNTER (EMERGENCY)
Facility: HOSPITAL | Age: 54
Discharge: HOME | End: 2024-06-28
Attending: STUDENT IN AN ORGANIZED HEALTH CARE EDUCATION/TRAINING PROGRAM
Payer: MEDICAID

## 2024-06-27 VITALS
RESPIRATION RATE: 27 BRPM | DIASTOLIC BLOOD PRESSURE: 71 MMHG | OXYGEN SATURATION: 91 % | WEIGHT: 148 LBS | SYSTOLIC BLOOD PRESSURE: 126 MMHG | TEMPERATURE: 98.1 F | HEIGHT: 64 IN | HEART RATE: 98 BPM | BODY MASS INDEX: 25.27 KG/M2

## 2024-06-27 DIAGNOSIS — N30.01 ACUTE CYSTITIS WITH HEMATURIA: Primary | ICD-10-CM

## 2024-06-27 LAB
ALBUMIN SERPL-MCNC: 3 G/DL (ref 3.4–5)
ALBUMIN/GLOB SERPL: 0.8 (ref 0.8–1.7)
ALP SERPL-CCNC: 102 U/L (ref 45–117)
ALT SERPL-CCNC: 51 U/L (ref 13–56)
ANION GAP SERPL CALC-SCNC: 4 MMOL/L (ref 3–18)
APPEARANCE UR: CLEAR
AST SERPL-CCNC: 38 U/L (ref 10–38)
BACTERIA URNS QL MICRO: ABNORMAL /HPF
BASOPHILS # BLD: 0 K/UL (ref 0–0.1)
BASOPHILS NFR BLD: 1 % (ref 0–2)
BILIRUB DIRECT SERPL-MCNC: <0.1 MG/DL (ref 0–0.2)
BILIRUB SERPL-MCNC: 0.3 MG/DL (ref 0.2–1)
BILIRUB UR QL: NEGATIVE
BUN SERPL-MCNC: 9 MG/DL (ref 7–18)
BUN/CREAT SERPL: 11 (ref 12–20)
CALCIUM SERPL-MCNC: 9.3 MG/DL (ref 8.5–10.1)
CHLORIDE SERPL-SCNC: 105 MMOL/L (ref 100–111)
CO2 SERPL-SCNC: 30 MMOL/L (ref 21–32)
COLOR UR: ABNORMAL
CREAT SERPL-MCNC: 0.85 MG/DL (ref 0.6–1.3)
DIFFERENTIAL METHOD BLD: ABNORMAL
EOSINOPHIL # BLD: 0.3 K/UL (ref 0–0.4)
EOSINOPHIL NFR BLD: 6 % (ref 0–5)
EPITH CASTS URNS QL MICRO: ABNORMAL /LPF (ref 0–5)
ERYTHROCYTE [DISTWIDTH] IN BLOOD BY AUTOMATED COUNT: 14.3 % (ref 11.6–14.5)
GLOBULIN SER CALC-MCNC: 4 G/DL (ref 2–4)
GLUCOSE SERPL-MCNC: 84 MG/DL (ref 74–99)
GLUCOSE UR STRIP.AUTO-MCNC: NEGATIVE MG/DL
HCT VFR BLD AUTO: 31.6 % (ref 35–45)
HGB BLD-MCNC: 10.4 G/DL (ref 12–16)
HGB UR QL STRIP: ABNORMAL
IMM GRANULOCYTES # BLD AUTO: 0 K/UL (ref 0–0.04)
IMM GRANULOCYTES NFR BLD AUTO: 0 % (ref 0–0.5)
KETONES UR QL STRIP.AUTO: NEGATIVE MG/DL
LEUKOCYTE ESTERASE UR QL STRIP.AUTO: ABNORMAL
LIPASE SERPL-CCNC: 20 U/L (ref 13–75)
LYMPHOCYTES # BLD: 1.7 K/UL (ref 0.9–3.6)
LYMPHOCYTES NFR BLD: 36 % (ref 21–52)
MCH RBC QN AUTO: 30.5 PG (ref 24–34)
MCHC RBC AUTO-ENTMCNC: 32.9 G/DL (ref 31–37)
MCV RBC AUTO: 92.7 FL (ref 78–100)
MONOCYTES # BLD: 0.4 K/UL (ref 0.05–1.2)
MONOCYTES NFR BLD: 8 % (ref 3–10)
NEUTS SEG # BLD: 2.3 K/UL (ref 1.8–8)
NEUTS SEG NFR BLD: 49 % (ref 40–73)
NITRITE UR QL STRIP.AUTO: NEGATIVE
NRBC # BLD: 0 K/UL (ref 0–0.01)
NRBC BLD-RTO: 0 PER 100 WBC
PH UR STRIP: 7.5 (ref 5–8)
PLATELET # BLD AUTO: 242 K/UL (ref 135–420)
PMV BLD AUTO: 10 FL (ref 9.2–11.8)
POTASSIUM SERPL-SCNC: 3.9 MMOL/L (ref 3.5–5.5)
PROT SERPL-MCNC: 7 G/DL (ref 6.4–8.2)
PROT UR STRIP-MCNC: 30 MG/DL
RBC # BLD AUTO: 3.41 M/UL (ref 4.2–5.3)
RBC #/AREA URNS HPF: ABNORMAL /HPF (ref 0–5)
SODIUM SERPL-SCNC: 139 MMOL/L (ref 136–145)
SP GR UR REFRACTOMETRY: 1.01 (ref 1–1.03)
UROBILINOGEN UR QL STRIP.AUTO: 0.2 EU/DL (ref 0.2–1)
WBC # BLD AUTO: 4.6 K/UL (ref 4.6–13.2)
WBC URNS QL MICRO: ABNORMAL /HPF (ref 0–4)

## 2024-06-27 PROCEDURE — 74177 CT ABD & PELVIS W/CONTRAST: CPT

## 2024-06-27 PROCEDURE — 80048 BASIC METABOLIC PNL TOTAL CA: CPT

## 2024-06-27 PROCEDURE — 6360000004 HC RX CONTRAST MEDICATION

## 2024-06-27 PROCEDURE — 6360000002 HC RX W HCPCS

## 2024-06-27 PROCEDURE — 80076 HEPATIC FUNCTION PANEL: CPT

## 2024-06-27 PROCEDURE — 96374 THER/PROPH/DIAG INJ IV PUSH: CPT

## 2024-06-27 PROCEDURE — 94761 N-INVAS EAR/PLS OXIMETRY MLT: CPT

## 2024-06-27 PROCEDURE — 87086 URINE CULTURE/COLONY COUNT: CPT

## 2024-06-27 PROCEDURE — 99285 EMERGENCY DEPT VISIT HI MDM: CPT

## 2024-06-27 PROCEDURE — 2580000003 HC RX 258

## 2024-06-27 PROCEDURE — 96375 TX/PRO/DX INJ NEW DRUG ADDON: CPT

## 2024-06-27 PROCEDURE — 85025 COMPLETE CBC W/AUTO DIFF WBC: CPT

## 2024-06-27 PROCEDURE — 93005 ELECTROCARDIOGRAM TRACING: CPT

## 2024-06-27 PROCEDURE — 81001 URINALYSIS AUTO W/SCOPE: CPT

## 2024-06-27 PROCEDURE — 83690 ASSAY OF LIPASE: CPT

## 2024-06-27 RX ORDER — DROPERIDOL 2.5 MG/ML
0.62 INJECTION, SOLUTION INTRAMUSCULAR; INTRAVENOUS ONCE
Status: COMPLETED | OUTPATIENT
Start: 2024-06-27 | End: 2024-06-27

## 2024-06-27 RX ORDER — DIPHENHYDRAMINE HYDROCHLORIDE 50 MG/ML
25 INJECTION INTRAMUSCULAR; INTRAVENOUS
Status: DISCONTINUED | OUTPATIENT
Start: 2024-06-27 | End: 2024-06-27

## 2024-06-27 RX ORDER — SULFAMETHOXAZOLE AND TRIMETHOPRIM 800; 160 MG/1; MG/1
1 TABLET ORAL 2 TIMES DAILY
Qty: 20 TABLET | Refills: 0 | Status: SHIPPED | OUTPATIENT
Start: 2024-06-27 | End: 2024-07-07

## 2024-06-27 RX ADMIN — CEFTRIAXONE 1000 MG: 1 INJECTION, POWDER, FOR SOLUTION INTRAMUSCULAR; INTRAVENOUS at 19:02

## 2024-06-27 RX ADMIN — DROPERIDOL 0.62 MG: 2.5 INJECTION, SOLUTION INTRAMUSCULAR; INTRAVENOUS at 19:30

## 2024-06-27 RX ADMIN — IOPAMIDOL 100 ML: 612 INJECTION, SOLUTION INTRAVENOUS at 20:53

## 2024-06-27 ASSESSMENT — PAIN - FUNCTIONAL ASSESSMENT: PAIN_FUNCTIONAL_ASSESSMENT: 0-10

## 2024-06-27 ASSESSMENT — PAIN SCALES - GENERAL: PAINLEVEL_OUTOF10: 10

## 2024-06-27 NOTE — ED PROVIDER NOTES
clindamycin 150 MG capsule  Commonly known as: CLEOCIN     dicyclomine 20 MG tablet  Commonly known as: BENTYL  Take 1 tablet by mouth 4 times daily     diphenhydrAMINE 25 MG tablet  Commonly known as: Benadryl Allergy  Take 1 tablet by mouth every 6 hours as needed for Itching or Allergies     gabapentin 100 MG capsule  Commonly known as: NEURONTIN  Take 2 capsules by mouth in the morning and at bedtime for 15 days. Max Daily Amount: 400 mg     HYDROcodone-acetaminophen 7.5-325 MG per tablet  Commonly known as: Norco  Take 1 tablet by mouth 2 times daily for 30 days. Intended supply: 30 days Max Daily Amount: 2 tablets     ibuprofen 800 MG tablet  Commonly known as: ADVIL;MOTRIN     levETIRAcetam 1000 MG tablet  Commonly known as: KEPPRA  Take one tab twice a day     lidocaine 5 % ointment  Commonly known as: XYLOCAINE     Linzess 290 MCG Caps capsule  Generic drug: linaclotide     meloxicam 15 MG tablet  Commonly known as: MOBIC     omeprazole 40 MG delayed release capsule  Commonly known as: PRILOSEC     penicillin v potassium 500 MG tablet  Commonly known as: VEETID     QUEtiapine 300 MG extended release tablet  Commonly known as: SEROquel XR     sertraline 100 MG tablet  Commonly known as: ZOLOFT     Symbicort 80-4.5 MCG/ACT Aero  Generic drug: budesonide-formoterol     tamsulosin 0.4 MG capsule  Commonly known as: FLOMAX  Take 1 capsule by mouth daily     temazepam 30 MG capsule  Commonly known as: RESTORIL     traMADol 50 MG tablet  Commonly known as: ULTRAM     traZODone 100 MG tablet  Commonly known as: DESYREL     trospium 20 MG tablet  Commonly known as: SANCTURA  Take 1 tablet by mouth 2 times daily as needed (stent discomfort)     Vios Aerosol Delivery System Misc     zolpidem 10 MG tablet  Commonly known as: AMBIEN               Where to Get Your Medications        Information about where to get these medications is not yet available    Ask your nurse or doctor about these

## 2024-06-27 NOTE — ED TRIAGE NOTES
Patient presented to the Emergency Dept with a complaint of generalized swelling to body and lower abdominal and back pain, minimal amount of urine for the past month    PMH: Kidney stone had stent placed on May 29th     Patient alert and oriented x 4, patient breathes freely on room air in nil cardiopulmonary distress

## 2024-06-28 LAB
BACTERIA SPEC CULT: NORMAL
EKG ATRIAL RATE: 97 BPM
EKG DIAGNOSIS: NORMAL
EKG P AXIS: 49 DEGREES
EKG P-R INTERVAL: 156 MS
EKG Q-T INTERVAL: 358 MS
EKG QRS DURATION: 78 MS
EKG QTC CALCULATION (BAZETT): 454 MS
EKG R AXIS: 15 DEGREES
EKG T AXIS: 33 DEGREES
EKG VENTRICULAR RATE: 97 BPM
SERVICE CMNT-IMP: NORMAL

## 2024-06-28 PROCEDURE — 93010 ELECTROCARDIOGRAM REPORT: CPT | Performed by: INTERNAL MEDICINE

## 2024-06-28 NOTE — DISCHARGE INSTRUCTIONS
You were evaluated in the ED today for abdominal pain.  You are found to have a urinary tract infection.  It is very important you take your antibiotic as prescribed.  Please finish the full course of antibiotics even if your symptoms improve.  You have an appointment tomorrow with your urologist.  Please follow-up tomorrow.  You also have scheduled for surgery on 07/12/2024    Please follow-up with your urologist.    Please return to the ED if you develop worsening abdominal pain, persistent vomiting unable to tolerate fluids, chest pain or shortness of breath or any other worsening or concerning symptoms

## 2024-07-01 PROBLEM — N39.0 UTI (URINARY TRACT INFECTION): Status: RESOLVED | Noted: 2024-06-01 | Resolved: 2024-07-01

## 2024-07-03 ENCOUNTER — APPOINTMENT (OUTPATIENT)
Facility: HOSPITAL | Age: 54
DRG: 446 | End: 2024-07-03
Payer: MEDICAID

## 2024-07-03 ENCOUNTER — HOSPITAL ENCOUNTER (INPATIENT)
Facility: HOSPITAL | Age: 54
LOS: 6 days | Discharge: HOME | DRG: 446 | End: 2024-07-09
Attending: STUDENT IN AN ORGANIZED HEALTH CARE EDUCATION/TRAINING PROGRAM | Admitting: INTERNAL MEDICINE
Payer: MEDICAID

## 2024-07-03 DIAGNOSIS — R56.9 SEIZURES (HCC): ICD-10-CM

## 2024-07-03 PROBLEM — R10.9 ABDOMINAL PAIN: Status: ACTIVE | Noted: 2024-07-03

## 2024-07-03 LAB
ALBUMIN SERPL-MCNC: 3.4 G/DL (ref 3.4–5)
ALBUMIN/GLOB SERPL: 0.9 (ref 0.8–1.7)
ALP SERPL-CCNC: 94 U/L (ref 45–117)
ALT SERPL-CCNC: 51 U/L (ref 13–56)
ANION GAP SERPL CALC-SCNC: 6 MMOL/L (ref 3–18)
APPEARANCE UR: ABNORMAL
AST SERPL-CCNC: 43 U/L (ref 10–38)
BACTERIA URNS QL MICRO: ABNORMAL /HPF
BASOPHILS # BLD: 0 K/UL (ref 0–0.1)
BASOPHILS NFR BLD: 1 % (ref 0–2)
BILIRUB SERPL-MCNC: 0.2 MG/DL (ref 0.2–1)
BILIRUB UR QL: NEGATIVE
BUN SERPL-MCNC: 14 MG/DL (ref 7–18)
BUN/CREAT SERPL: 17 (ref 12–20)
CALCIUM SERPL-MCNC: 9.7 MG/DL (ref 8.5–10.1)
CHLORIDE SERPL-SCNC: 108 MMOL/L (ref 100–111)
CO2 SERPL-SCNC: 24 MMOL/L (ref 21–32)
COLOR UR: YELLOW
CREAT SERPL-MCNC: 0.82 MG/DL (ref 0.6–1.3)
DIFFERENTIAL METHOD BLD: ABNORMAL
EOSINOPHIL # BLD: 0.2 K/UL (ref 0–0.4)
EOSINOPHIL NFR BLD: 5 % (ref 0–5)
EPITH CASTS URNS QL MICRO: ABNORMAL /LPF (ref 0–5)
ERYTHROCYTE [DISTWIDTH] IN BLOOD BY AUTOMATED COUNT: 14.3 % (ref 11.6–14.5)
GLOBULIN SER CALC-MCNC: 4 G/DL (ref 2–4)
GLUCOSE SERPL-MCNC: 90 MG/DL (ref 74–99)
GLUCOSE UR STRIP.AUTO-MCNC: NEGATIVE MG/DL
HCT VFR BLD AUTO: 34.7 % (ref 35–45)
HGB BLD-MCNC: 11.5 G/DL (ref 12–16)
HGB UR QL STRIP: ABNORMAL
IMM GRANULOCYTES # BLD AUTO: 0 K/UL (ref 0–0.04)
IMM GRANULOCYTES NFR BLD AUTO: 0 % (ref 0–0.5)
KETONES UR QL STRIP.AUTO: NEGATIVE MG/DL
LEUKOCYTE ESTERASE UR QL STRIP.AUTO: ABNORMAL
LYMPHOCYTES # BLD: 1.4 K/UL (ref 0.9–3.6)
LYMPHOCYTES NFR BLD: 45 % (ref 21–52)
MCH RBC QN AUTO: 30.3 PG (ref 24–34)
MCHC RBC AUTO-ENTMCNC: 33.1 G/DL (ref 31–37)
MCV RBC AUTO: 91.3 FL (ref 78–100)
MONOCYTES # BLD: 0.2 K/UL (ref 0.05–1.2)
MONOCYTES NFR BLD: 7 % (ref 3–10)
NEUTS SEG # BLD: 1.3 K/UL (ref 1.8–8)
NEUTS SEG NFR BLD: 42 % (ref 40–73)
NITRITE UR QL STRIP.AUTO: NEGATIVE
NRBC # BLD: 0 K/UL (ref 0–0.01)
NRBC BLD-RTO: 0 PER 100 WBC
PH UR STRIP: 6.5 (ref 5–8)
PLATELET # BLD AUTO: 241 K/UL (ref 135–420)
PMV BLD AUTO: 10.2 FL (ref 9.2–11.8)
POTASSIUM SERPL-SCNC: 4.5 MMOL/L (ref 3.5–5.5)
PROT SERPL-MCNC: 7.4 G/DL (ref 6.4–8.2)
PROT UR STRIP-MCNC: 30 MG/DL
RBC # BLD AUTO: 3.8 M/UL (ref 4.2–5.3)
RBC #/AREA URNS HPF: ABNORMAL /HPF (ref 0–5)
SODIUM SERPL-SCNC: 138 MMOL/L (ref 136–145)
SP GR UR REFRACTOMETRY: 1.01 (ref 1–1.03)
UROBILINOGEN UR QL STRIP.AUTO: 0.2 EU/DL (ref 0.2–1)
WBC # BLD AUTO: 3.1 K/UL (ref 4.6–13.2)
WBC URNS QL MICRO: ABNORMAL /HPF (ref 0–4)

## 2024-07-03 PROCEDURE — 85025 COMPLETE CBC W/AUTO DIFF WBC: CPT

## 2024-07-03 PROCEDURE — 96374 THER/PROPH/DIAG INJ IV PUSH: CPT

## 2024-07-03 PROCEDURE — 87086 URINE CULTURE/COLONY COUNT: CPT

## 2024-07-03 PROCEDURE — 1100000000 HC RM PRIVATE

## 2024-07-03 PROCEDURE — 74177 CT ABD & PELVIS W/CONTRAST: CPT

## 2024-07-03 PROCEDURE — 6360000004 HC RX CONTRAST MEDICATION: Performed by: PHYSICIAN ASSISTANT

## 2024-07-03 PROCEDURE — 6360000002 HC RX W HCPCS: Performed by: STUDENT IN AN ORGANIZED HEALTH CARE EDUCATION/TRAINING PROGRAM

## 2024-07-03 PROCEDURE — 99285 EMERGENCY DEPT VISIT HI MDM: CPT

## 2024-07-03 PROCEDURE — 81001 URINALYSIS AUTO W/SCOPE: CPT

## 2024-07-03 PROCEDURE — 2580000003 HC RX 258: Performed by: STUDENT IN AN ORGANIZED HEALTH CARE EDUCATION/TRAINING PROGRAM

## 2024-07-03 PROCEDURE — 80053 COMPREHEN METABOLIC PANEL: CPT

## 2024-07-03 PROCEDURE — 6370000000 HC RX 637 (ALT 250 FOR IP): Performed by: STUDENT IN AN ORGANIZED HEALTH CARE EDUCATION/TRAINING PROGRAM

## 2024-07-03 PROCEDURE — 6360000002 HC RX W HCPCS: Performed by: PHYSICIAN ASSISTANT

## 2024-07-03 PROCEDURE — 96375 TX/PRO/DX INJ NEW DRUG ADDON: CPT

## 2024-07-03 RX ORDER — ONDANSETRON 2 MG/ML
4 INJECTION INTRAMUSCULAR; INTRAVENOUS
Status: COMPLETED | OUTPATIENT
Start: 2024-07-03 | End: 2024-07-03

## 2024-07-03 RX ORDER — DIPHENHYDRAMINE HCL 25 MG
25 CAPSULE ORAL
Status: COMPLETED | OUTPATIENT
Start: 2024-07-03 | End: 2024-07-03

## 2024-07-03 RX ORDER — LEVETIRACETAM 500 MG/5ML
1000 INJECTION, SOLUTION, CONCENTRATE INTRAVENOUS ONCE
Status: COMPLETED | OUTPATIENT
Start: 2024-07-03 | End: 2024-07-03

## 2024-07-03 RX ORDER — FENTANYL CITRATE 50 UG/ML
50 INJECTION, SOLUTION INTRAMUSCULAR; INTRAVENOUS
Status: COMPLETED | OUTPATIENT
Start: 2024-07-03 | End: 2024-07-03

## 2024-07-03 RX ORDER — DIPHENHYDRAMINE HYDROCHLORIDE 50 MG/ML
25 INJECTION INTRAMUSCULAR; INTRAVENOUS
Status: COMPLETED | OUTPATIENT
Start: 2024-07-03 | End: 2024-07-03

## 2024-07-03 RX ORDER — 0.9 % SODIUM CHLORIDE 0.9 %
1000 INTRAVENOUS SOLUTION INTRAVENOUS ONCE
Status: COMPLETED | OUTPATIENT
Start: 2024-07-03 | End: 2024-07-04

## 2024-07-03 RX ADMIN — ONDANSETRON 4 MG: 2 INJECTION INTRAMUSCULAR; INTRAVENOUS at 19:22

## 2024-07-03 RX ADMIN — DIPHENHYDRAMINE HYDROCHLORIDE 25 MG: 25 CAPSULE ORAL at 23:13

## 2024-07-03 RX ADMIN — HYDROMORPHONE HYDROCHLORIDE 0.5 MG: 1 INJECTION, SOLUTION INTRAMUSCULAR; INTRAVENOUS; SUBCUTANEOUS at 19:22

## 2024-07-03 RX ADMIN — FENTANYL CITRATE 50 MCG: 50 INJECTION INTRAMUSCULAR; INTRAVENOUS at 23:13

## 2024-07-03 RX ADMIN — CEFTRIAXONE 1000 MG: 1 INJECTION, POWDER, FOR SOLUTION INTRAMUSCULAR; INTRAVENOUS at 21:20

## 2024-07-03 RX ADMIN — SODIUM CHLORIDE 1000 ML: 9 INJECTION, SOLUTION INTRAVENOUS at 21:27

## 2024-07-03 RX ADMIN — LEVETIRACETAM 1000 MG: 100 INJECTION INTRAVENOUS at 23:14

## 2024-07-03 RX ADMIN — DIPHENHYDRAMINE HYDROCHLORIDE 25 MG: 50 INJECTION, SOLUTION INTRAMUSCULAR; INTRAVENOUS at 19:22

## 2024-07-03 RX ADMIN — IOPAMIDOL 80 ML: 755 INJECTION, SOLUTION INTRAVENOUS at 20:37

## 2024-07-03 ASSESSMENT — PAIN DESCRIPTION - LOCATION
LOCATION: FLANK
LOCATION: GROIN
LOCATION: FLANK;ABDOMEN

## 2024-07-03 ASSESSMENT — PAIN - FUNCTIONAL ASSESSMENT: PAIN_FUNCTIONAL_ASSESSMENT: 0-10

## 2024-07-03 ASSESSMENT — PAIN SCALES - GENERAL
PAINLEVEL_OUTOF10: 9
PAINLEVEL_OUTOF10: 7
PAINLEVEL_OUTOF10: 8

## 2024-07-03 ASSESSMENT — PAIN DESCRIPTION - ORIENTATION: ORIENTATION: LEFT

## 2024-07-04 LAB
ANION GAP SERPL CALC-SCNC: 6 MMOL/L (ref 3–18)
BACTERIA SPEC CULT: NORMAL
BASOPHILS # BLD: 0 K/UL (ref 0–0.1)
BASOPHILS NFR BLD: 1 % (ref 0–2)
BUN SERPL-MCNC: 14 MG/DL (ref 7–18)
BUN/CREAT SERPL: 17 (ref 12–20)
CALCIUM SERPL-MCNC: 8.8 MG/DL (ref 8.5–10.1)
CHLORIDE SERPL-SCNC: 108 MMOL/L (ref 100–111)
CO2 SERPL-SCNC: 23 MMOL/L (ref 21–32)
CREAT SERPL-MCNC: 0.84 MG/DL (ref 0.6–1.3)
DIFFERENTIAL METHOD BLD: ABNORMAL
EOSINOPHIL # BLD: 0.2 K/UL (ref 0–0.4)
EOSINOPHIL NFR BLD: 4 % (ref 0–5)
ERYTHROCYTE [DISTWIDTH] IN BLOOD BY AUTOMATED COUNT: 14.3 % (ref 11.6–14.5)
GLUCOSE SERPL-MCNC: 104 MG/DL (ref 74–99)
HCT VFR BLD AUTO: 34.1 % (ref 35–45)
HGB BLD-MCNC: 10.9 G/DL (ref 12–16)
IMM GRANULOCYTES # BLD AUTO: 0 K/UL (ref 0–0.04)
IMM GRANULOCYTES NFR BLD AUTO: 0 % (ref 0–0.5)
LYMPHOCYTES # BLD: 1.7 K/UL (ref 0.9–3.6)
LYMPHOCYTES NFR BLD: 47 % (ref 21–52)
MCH RBC QN AUTO: 29.9 PG (ref 24–34)
MCHC RBC AUTO-ENTMCNC: 32 G/DL (ref 31–37)
MCV RBC AUTO: 93.4 FL (ref 78–100)
MONOCYTES # BLD: 0.3 K/UL (ref 0.05–1.2)
MONOCYTES NFR BLD: 9 % (ref 3–10)
NEUTS SEG # BLD: 1.4 K/UL (ref 1.8–8)
NEUTS SEG NFR BLD: 39 % (ref 40–73)
NRBC # BLD: 0 K/UL (ref 0–0.01)
NRBC BLD-RTO: 0 PER 100 WBC
PLATELET # BLD AUTO: 251 K/UL (ref 135–420)
PMV BLD AUTO: 10 FL (ref 9.2–11.8)
POTASSIUM SERPL-SCNC: 4.1 MMOL/L (ref 3.5–5.5)
RBC # BLD AUTO: 3.65 M/UL (ref 4.2–5.3)
SERVICE CMNT-IMP: NORMAL
SODIUM SERPL-SCNC: 137 MMOL/L (ref 136–145)
WBC # BLD AUTO: 3.5 K/UL (ref 4.6–13.2)

## 2024-07-04 PROCEDURE — 2580000003 HC RX 258: Performed by: INTERNAL MEDICINE

## 2024-07-04 PROCEDURE — 6360000002 HC RX W HCPCS: Performed by: INTERNAL MEDICINE

## 2024-07-04 PROCEDURE — 6370000000 HC RX 637 (ALT 250 FOR IP): Performed by: INTERNAL MEDICINE

## 2024-07-04 PROCEDURE — 2580000003 HC RX 258: Performed by: HOSPITALIST

## 2024-07-04 PROCEDURE — 6360000002 HC RX W HCPCS: Performed by: HOSPITALIST

## 2024-07-04 PROCEDURE — 1100000003 HC PRIVATE W/ TELEMETRY

## 2024-07-04 PROCEDURE — 85025 COMPLETE CBC W/AUTO DIFF WBC: CPT

## 2024-07-04 PROCEDURE — 99232 SBSQ HOSP IP/OBS MODERATE 35: CPT | Performed by: HOSPITALIST

## 2024-07-04 PROCEDURE — 94761 N-INVAS EAR/PLS OXIMETRY MLT: CPT

## 2024-07-04 PROCEDURE — 94640 AIRWAY INHALATION TREATMENT: CPT

## 2024-07-04 PROCEDURE — 87086 URINE CULTURE/COLONY COUNT: CPT

## 2024-07-04 PROCEDURE — 80048 BASIC METABOLIC PNL TOTAL CA: CPT

## 2024-07-04 PROCEDURE — 36415 COLL VENOUS BLD VENIPUNCTURE: CPT

## 2024-07-04 PROCEDURE — 6370000000 HC RX 637 (ALT 250 FOR IP): Performed by: HOSPITALIST

## 2024-07-04 PROCEDURE — 94664 DEMO&/EVAL PT USE INHALER: CPT

## 2024-07-04 RX ORDER — SODIUM CHLORIDE 0.9 % (FLUSH) 0.9 %
5-40 SYRINGE (ML) INJECTION EVERY 12 HOURS SCHEDULED
Status: DISCONTINUED | OUTPATIENT
Start: 2024-07-04 | End: 2024-07-09 | Stop reason: HOSPADM

## 2024-07-04 RX ORDER — DIPHENHYDRAMINE HCL 25 MG
25 CAPSULE ORAL EVERY 6 HOURS PRN
Status: DISCONTINUED | OUTPATIENT
Start: 2024-07-04 | End: 2024-07-04

## 2024-07-04 RX ORDER — ACETAMINOPHEN 650 MG/1
650 SUPPOSITORY RECTAL EVERY 6 HOURS PRN
Status: DISCONTINUED | OUTPATIENT
Start: 2024-07-04 | End: 2024-07-09 | Stop reason: HOSPADM

## 2024-07-04 RX ORDER — SODIUM CHLORIDE 0.9 % (FLUSH) 0.9 %
5-40 SYRINGE (ML) INJECTION PRN
Status: DISCONTINUED | OUTPATIENT
Start: 2024-07-04 | End: 2024-07-09 | Stop reason: HOSPADM

## 2024-07-04 RX ORDER — POLYETHYLENE GLYCOL 3350 17 G/17G
17 POWDER, FOR SOLUTION ORAL DAILY PRN
Status: DISCONTINUED | OUTPATIENT
Start: 2024-07-04 | End: 2024-07-09 | Stop reason: HOSPADM

## 2024-07-04 RX ORDER — ONDANSETRON 4 MG/1
4 TABLET, ORALLY DISINTEGRATING ORAL EVERY 8 HOURS PRN
Status: DISCONTINUED | OUTPATIENT
Start: 2024-07-04 | End: 2024-07-09

## 2024-07-04 RX ORDER — ATORVASTATIN CALCIUM 40 MG/1
40 TABLET, FILM COATED ORAL NIGHTLY
Status: DISCONTINUED | OUTPATIENT
Start: 2024-07-04 | End: 2024-07-09 | Stop reason: HOSPADM

## 2024-07-04 RX ORDER — POTASSIUM CHLORIDE 7.45 MG/ML
10 INJECTION INTRAVENOUS PRN
Status: DISCONTINUED | OUTPATIENT
Start: 2024-07-04 | End: 2024-07-09 | Stop reason: HOSPADM

## 2024-07-04 RX ORDER — MAGNESIUM SULFATE IN WATER 40 MG/ML
2000 INJECTION, SOLUTION INTRAVENOUS PRN
Status: DISCONTINUED | OUTPATIENT
Start: 2024-07-04 | End: 2024-07-09 | Stop reason: HOSPADM

## 2024-07-04 RX ORDER — DIPHENHYDRAMINE HYDROCHLORIDE 50 MG/ML
12.5 INJECTION INTRAMUSCULAR; INTRAVENOUS EVERY 4 HOURS PRN
Status: DISCONTINUED | OUTPATIENT
Start: 2024-07-04 | End: 2024-07-09 | Stop reason: HOSPADM

## 2024-07-04 RX ORDER — ENOXAPARIN SODIUM 100 MG/ML
40 INJECTION SUBCUTANEOUS DAILY
Status: DISCONTINUED | OUTPATIENT
Start: 2024-07-04 | End: 2024-07-05

## 2024-07-04 RX ORDER — BUPROPION HYDROCHLORIDE 300 MG/1
300 TABLET ORAL DAILY
Status: DISCONTINUED | OUTPATIENT
Start: 2024-07-04 | End: 2024-07-09 | Stop reason: HOSPADM

## 2024-07-04 RX ORDER — TEMAZEPAM 15 MG/1
30 CAPSULE ORAL NIGHTLY PRN
Status: DISCONTINUED | OUTPATIENT
Start: 2024-07-04 | End: 2024-07-09 | Stop reason: HOSPADM

## 2024-07-04 RX ORDER — SODIUM CHLORIDE 9 MG/ML
INJECTION, SOLUTION INTRAVENOUS CONTINUOUS
Status: DISCONTINUED | OUTPATIENT
Start: 2024-07-04 | End: 2024-07-05

## 2024-07-04 RX ORDER — PANTOPRAZOLE SODIUM 40 MG/1
40 TABLET, DELAYED RELEASE ORAL
Status: DISCONTINUED | OUTPATIENT
Start: 2024-07-04 | End: 2024-07-09 | Stop reason: HOSPADM

## 2024-07-04 RX ORDER — GABAPENTIN 400 MG/1
400 CAPSULE ORAL 3 TIMES DAILY
Status: DISCONTINUED | OUTPATIENT
Start: 2024-07-04 | End: 2024-07-09 | Stop reason: HOSPADM

## 2024-07-04 RX ORDER — QUETIAPINE 300 MG/1
300 TABLET, FILM COATED, EXTENDED RELEASE ORAL NIGHTLY
Status: DISCONTINUED | OUTPATIENT
Start: 2024-07-04 | End: 2024-07-09 | Stop reason: HOSPADM

## 2024-07-04 RX ORDER — TRAZODONE HYDROCHLORIDE 100 MG/1
100 TABLET ORAL NIGHTLY
Status: DISCONTINUED | OUTPATIENT
Start: 2024-07-04 | End: 2024-07-09 | Stop reason: HOSPADM

## 2024-07-04 RX ORDER — ALPRAZOLAM 0.5 MG/1
1 TABLET ORAL 4 TIMES DAILY PRN
Status: DISCONTINUED | OUTPATIENT
Start: 2024-07-04 | End: 2024-07-04

## 2024-07-04 RX ORDER — ALPRAZOLAM 0.5 MG/1
1 TABLET ORAL 2 TIMES DAILY PRN
Status: DISCONTINUED | OUTPATIENT
Start: 2024-07-04 | End: 2024-07-09 | Stop reason: HOSPADM

## 2024-07-04 RX ORDER — ACETAMINOPHEN 325 MG/1
650 TABLET ORAL EVERY 6 HOURS PRN
Status: DISCONTINUED | OUTPATIENT
Start: 2024-07-04 | End: 2024-07-09 | Stop reason: HOSPADM

## 2024-07-04 RX ORDER — ONDANSETRON 2 MG/ML
4 INJECTION INTRAMUSCULAR; INTRAVENOUS EVERY 6 HOURS PRN
Status: DISCONTINUED | OUTPATIENT
Start: 2024-07-04 | End: 2024-07-09

## 2024-07-04 RX ORDER — LEVETIRACETAM 500 MG/1
1000 TABLET ORAL 2 TIMES DAILY
Status: DISCONTINUED | OUTPATIENT
Start: 2024-07-04 | End: 2024-07-09 | Stop reason: HOSPADM

## 2024-07-04 RX ORDER — ZOLPIDEM TARTRATE 5 MG/1
5 TABLET ORAL NIGHTLY PRN
Status: DISCONTINUED | OUTPATIENT
Start: 2024-07-04 | End: 2024-07-09 | Stop reason: HOSPADM

## 2024-07-04 RX ORDER — TAMSULOSIN HYDROCHLORIDE 0.4 MG/1
0.4 CAPSULE ORAL DAILY
Status: DISCONTINUED | OUTPATIENT
Start: 2024-07-04 | End: 2024-07-09 | Stop reason: HOSPADM

## 2024-07-04 RX ORDER — SENNOSIDES 8.6 MG
650 CAPSULE ORAL EVERY 6 HOURS PRN
Status: DISCONTINUED | OUTPATIENT
Start: 2024-07-04 | End: 2024-07-04 | Stop reason: SDUPTHER

## 2024-07-04 RX ORDER — POTASSIUM CHLORIDE 20 MEQ/1
40 TABLET, EXTENDED RELEASE ORAL PRN
Status: DISCONTINUED | OUTPATIENT
Start: 2024-07-04 | End: 2024-07-09 | Stop reason: HOSPADM

## 2024-07-04 RX ORDER — ALBUTEROL SULFATE 2.5 MG/3ML
2.5 SOLUTION RESPIRATORY (INHALATION) 4 TIMES DAILY PRN
Status: DISCONTINUED | OUTPATIENT
Start: 2024-07-04 | End: 2024-07-09 | Stop reason: HOSPADM

## 2024-07-04 RX ORDER — TRAMADOL HYDROCHLORIDE 50 MG/1
100 TABLET ORAL EVERY 8 HOURS PRN
Status: DISCONTINUED | OUTPATIENT
Start: 2024-07-04 | End: 2024-07-05

## 2024-07-04 RX ORDER — CELECOXIB 100 MG/1
100 CAPSULE ORAL 2 TIMES DAILY
Status: DISCONTINUED | OUTPATIENT
Start: 2024-07-04 | End: 2024-07-09 | Stop reason: HOSPADM

## 2024-07-04 RX ORDER — DICYCLOMINE HYDROCHLORIDE 10 MG/1
20 CAPSULE ORAL 4 TIMES DAILY PRN
Status: DISCONTINUED | OUTPATIENT
Start: 2024-07-04 | End: 2024-07-09 | Stop reason: HOSPADM

## 2024-07-04 RX ORDER — HYDROCODONE BITARTRATE AND ACETAMINOPHEN 7.5; 325 MG/1; MG/1
1 TABLET ORAL EVERY 6 HOURS PRN
Status: DISCONTINUED | OUTPATIENT
Start: 2024-07-04 | End: 2024-07-09 | Stop reason: HOSPADM

## 2024-07-04 RX ORDER — SODIUM CHLORIDE 9 MG/ML
INJECTION, SOLUTION INTRAVENOUS PRN
Status: DISCONTINUED | OUTPATIENT
Start: 2024-07-04 | End: 2024-07-09 | Stop reason: HOSPADM

## 2024-07-04 RX ADMIN — WATER 1000 MG: 1 INJECTION INTRAMUSCULAR; INTRAVENOUS; SUBCUTANEOUS at 21:31

## 2024-07-04 RX ADMIN — HYDROMORPHONE HYDROCHLORIDE 0.5 MG: 1 INJECTION, SOLUTION INTRAMUSCULAR; INTRAVENOUS; SUBCUTANEOUS at 16:53

## 2024-07-04 RX ADMIN — CELECOXIB 100 MG: 100 CAPSULE ORAL at 02:30

## 2024-07-04 RX ADMIN — HYDROCODONE BITARTRATE AND ACETAMINOPHEN 1 TABLET: 7.5; 325 TABLET ORAL at 13:06

## 2024-07-04 RX ADMIN — LEVETIRACETAM 1000 MG: 500 TABLET, FILM COATED ORAL at 08:39

## 2024-07-04 RX ADMIN — DIPHENHYDRAMINE HYDROCHLORIDE 12.5 MG: 50 INJECTION, SOLUTION INTRAMUSCULAR; INTRAVENOUS at 17:32

## 2024-07-04 RX ADMIN — CELECOXIB 100 MG: 100 CAPSULE ORAL at 08:39

## 2024-07-04 RX ADMIN — DIPHENHYDRAMINE HYDROCHLORIDE 25 MG: 25 CAPSULE ORAL at 04:13

## 2024-07-04 RX ADMIN — ENOXAPARIN SODIUM 40 MG: 100 INJECTION SUBCUTANEOUS at 08:33

## 2024-07-04 RX ADMIN — ALPRAZOLAM 1 MG: 0.5 TABLET ORAL at 13:06

## 2024-07-04 RX ADMIN — GABAPENTIN 400 MG: 400 CAPSULE ORAL at 21:32

## 2024-07-04 RX ADMIN — TAMSULOSIN HYDROCHLORIDE 0.4 MG: 0.4 CAPSULE ORAL at 08:39

## 2024-07-04 RX ADMIN — DIPHENHYDRAMINE HYDROCHLORIDE 12.5 MG: 50 INJECTION, SOLUTION INTRAMUSCULAR; INTRAVENOUS at 22:33

## 2024-07-04 RX ADMIN — HYDROCODONE BITARTRATE AND ACETAMINOPHEN 1 TABLET: 7.5; 325 TABLET ORAL at 03:00

## 2024-07-04 RX ADMIN — PANTOPRAZOLE SODIUM 40 MG: 40 TABLET, DELAYED RELEASE ORAL at 06:25

## 2024-07-04 RX ADMIN — ATORVASTATIN CALCIUM 40 MG: 40 TABLET, FILM COATED ORAL at 21:32

## 2024-07-04 RX ADMIN — TRAZODONE HYDROCHLORIDE 100 MG: 100 TABLET ORAL at 21:32

## 2024-07-04 RX ADMIN — ARFORMOTEROL TARTRATE: 15 SOLUTION RESPIRATORY (INHALATION) at 08:12

## 2024-07-04 RX ADMIN — LEVETIRACETAM 1000 MG: 500 TABLET, FILM COATED ORAL at 21:31

## 2024-07-04 RX ADMIN — GABAPENTIN 400 MG: 400 CAPSULE ORAL at 14:08

## 2024-07-04 RX ADMIN — SODIUM CHLORIDE, PRESERVATIVE FREE 10 ML: 5 INJECTION INTRAVENOUS at 21:40

## 2024-07-04 RX ADMIN — HYDROMORPHONE HYDROCHLORIDE 0.5 MG: 1 INJECTION, SOLUTION INTRAMUSCULAR; INTRAVENOUS; SUBCUTANEOUS at 04:13

## 2024-07-04 RX ADMIN — BUPROPION HYDROCHLORIDE 300 MG: 300 TABLET, EXTENDED RELEASE ORAL at 08:39

## 2024-07-04 RX ADMIN — SERTRALINE 100 MG: 50 TABLET, FILM COATED ORAL at 08:39

## 2024-07-04 RX ADMIN — HYDROMORPHONE HYDROCHLORIDE 0.5 MG: 1 INJECTION, SOLUTION INTRAMUSCULAR; INTRAVENOUS; SUBCUTANEOUS at 22:33

## 2024-07-04 RX ADMIN — CELECOXIB 100 MG: 100 CAPSULE ORAL at 21:32

## 2024-07-04 RX ADMIN — HYDROMORPHONE HYDROCHLORIDE 0.5 MG: 1 INJECTION, SOLUTION INTRAMUSCULAR; INTRAVENOUS; SUBCUTANEOUS at 08:33

## 2024-07-04 RX ADMIN — QUETIAPINE FUMARATE 300 MG: 300 TABLET, EXTENDED RELEASE ORAL at 21:32

## 2024-07-04 RX ADMIN — ARFORMOTEROL TARTRATE: 15 SOLUTION RESPIRATORY (INHALATION) at 19:20

## 2024-07-04 RX ADMIN — SODIUM CHLORIDE: 9 INJECTION, SOLUTION INTRAVENOUS at 13:08

## 2024-07-04 RX ADMIN — DIPHENHYDRAMINE HYDROCHLORIDE 25 MG: 25 CAPSULE ORAL at 10:09

## 2024-07-04 RX ADMIN — ALPRAZOLAM 1 MG: 0.5 TABLET ORAL at 03:08

## 2024-07-04 RX ADMIN — ZOLPIDEM TARTRATE 5 MG: 5 TABLET ORAL at 22:32

## 2024-07-04 RX ADMIN — SODIUM CHLORIDE, PRESERVATIVE FREE 10 ML: 5 INJECTION INTRAVENOUS at 08:34

## 2024-07-04 ASSESSMENT — PAIN SCALES - GENERAL
PAINLEVEL_OUTOF10: 9
PAINLEVEL_OUTOF10: 8
PAINLEVEL_OUTOF10: 9
PAINLEVEL_OUTOF10: 9
PAINLEVEL_OUTOF10: 8
PAINLEVEL_OUTOF10: 0
PAINLEVEL_OUTOF10: 0
PAINLEVEL_OUTOF10: 10
PAINLEVEL_OUTOF10: 0
PAINLEVEL_OUTOF10: 5
PAINLEVEL_OUTOF10: 8
PAINLEVEL_OUTOF10: 9
PAINLEVEL_OUTOF10: 8
PAINLEVEL_OUTOF10: 7

## 2024-07-04 ASSESSMENT — PAIN - FUNCTIONAL ASSESSMENT
PAIN_FUNCTIONAL_ASSESSMENT: PREVENTS OR INTERFERES SOME ACTIVE ACTIVITIES AND ADLS
PAIN_FUNCTIONAL_ASSESSMENT: PREVENTS OR INTERFERES SOME ACTIVE ACTIVITIES AND ADLS
PAIN_FUNCTIONAL_ASSESSMENT: ACTIVITIES ARE NOT PREVENTED

## 2024-07-04 ASSESSMENT — PAIN DESCRIPTION - ORIENTATION
ORIENTATION: ANTERIOR
ORIENTATION: LEFT;LOWER
ORIENTATION: LEFT;ANTERIOR;LOWER
ORIENTATION: LEFT;LOWER;ANTERIOR
ORIENTATION: LEFT;ANTERIOR;LOWER
ORIENTATION: LEFT;ANTERIOR;LOWER
ORIENTATION: LEFT;LOWER
ORIENTATION: LEFT

## 2024-07-04 ASSESSMENT — PAIN DESCRIPTION - LOCATION
LOCATION: ABDOMEN

## 2024-07-04 ASSESSMENT — PAIN DESCRIPTION - DESCRIPTORS
DESCRIPTORS: SHARP
DESCRIPTORS: DISCOMFORT
DESCRIPTORS: ACHING

## 2024-07-04 ASSESSMENT — PAIN DESCRIPTION - PAIN TYPE: TYPE: ACUTE PAIN

## 2024-07-04 NOTE — ED NOTES
Report was given at bedside to Kamryn RN for assumption of care. All questions and concerns regarding assumption of care were answered and the nurse assuming care verbalized understanding.

## 2024-07-04 NOTE — ED NOTES
TRANSFER - OUT REPORT:    Verbal report given to VILMA Johnson on Garrison Buenrostro  being transferred to [facility] for routine progression of patient care       Report consisted of patient's Situation, Background, Assessment and   Recommendations(SBAR).     Information from the following report(s) Nurse Handoff Report, MAR, Recent Results, and Neuro Assessment was reviewed with the receiving nurse.    [place] Fall Assessment:                           Lines:   Peripheral IV 07/03/24 Right Forearm (Active)        Opportunity for questions and clarification was provided.      Patient transported with:  Transportation

## 2024-07-04 NOTE — ED PROVIDER NOTES
ED Course as of 07/04/24 0354   Wed Jul 03, 2024 2050 Cs to ks 52 yo female pm[facility] of nephrectomy / left stent cc- nausea/ flank pain/ vomiting/ workup- tachy / leukopenia/ plan- pending ct scan and labs  [KS]   2050 WBC, UA: 10 to 14 [KS]   2050 Leukocyte Esterase, Urine(!): MODERATE [KS]   2314 Per urology.  Recommendations for patient to be admitted.  Will place patient on IV antibiotics and scheduled for possible stent exchange. [KS]      ED Course User Index  [KS] Bladimir Talley MD Sarpong, Keneth, MD  07/04/24 0354

## 2024-07-05 LAB
ANION GAP SERPL CALC-SCNC: 3 MMOL/L (ref 3–18)
BASOPHILS # BLD: 0 K/UL (ref 0–0.1)
BASOPHILS NFR BLD: 1 % (ref 0–2)
BUN SERPL-MCNC: 15 MG/DL (ref 7–18)
BUN/CREAT SERPL: 19 (ref 12–20)
CALCIUM SERPL-MCNC: 8.2 MG/DL (ref 8.5–10.1)
CHLORIDE SERPL-SCNC: 111 MMOL/L (ref 100–111)
CO2 SERPL-SCNC: 26 MMOL/L (ref 21–32)
CREAT SERPL-MCNC: 0.78 MG/DL (ref 0.6–1.3)
DIFFERENTIAL METHOD BLD: ABNORMAL
EOSINOPHIL # BLD: 0.2 K/UL (ref 0–0.4)
EOSINOPHIL NFR BLD: 5 % (ref 0–5)
ERYTHROCYTE [DISTWIDTH] IN BLOOD BY AUTOMATED COUNT: 13.9 % (ref 11.6–14.5)
GLUCOSE SERPL-MCNC: 100 MG/DL (ref 74–99)
HCT VFR BLD AUTO: 31.5 % (ref 35–45)
HGB BLD-MCNC: 9.9 G/DL (ref 12–16)
IMM GRANULOCYTES # BLD AUTO: 0 K/UL (ref 0–0.04)
IMM GRANULOCYTES NFR BLD AUTO: 0 % (ref 0–0.5)
LYMPHOCYTES # BLD: 2.1 K/UL (ref 0.9–3.6)
LYMPHOCYTES NFR BLD: 52 % (ref 21–52)
MAGNESIUM SERPL-MCNC: 1.8 MG/DL (ref 1.6–2.6)
MCH RBC QN AUTO: 29.9 PG (ref 24–34)
MCHC RBC AUTO-ENTMCNC: 31.4 G/DL (ref 31–37)
MCV RBC AUTO: 95.2 FL (ref 78–100)
MONOCYTES # BLD: 0.3 K/UL (ref 0.05–1.2)
MONOCYTES NFR BLD: 8 % (ref 3–10)
NEUTS SEG # BLD: 1.4 K/UL (ref 1.8–8)
NEUTS SEG NFR BLD: 35 % (ref 40–73)
NRBC # BLD: 0 K/UL (ref 0–0.01)
NRBC BLD-RTO: 0 PER 100 WBC
PLATELET # BLD AUTO: 235 K/UL (ref 135–420)
PMV BLD AUTO: 10.2 FL (ref 9.2–11.8)
POTASSIUM SERPL-SCNC: 4.1 MMOL/L (ref 3.5–5.5)
RBC # BLD AUTO: 3.31 M/UL (ref 4.2–5.3)
SODIUM SERPL-SCNC: 140 MMOL/L (ref 136–145)
WBC # BLD AUTO: 4.1 K/UL (ref 4.6–13.2)

## 2024-07-05 PROCEDURE — 6360000002 HC RX W HCPCS: Performed by: HOSPITALIST

## 2024-07-05 PROCEDURE — 2580000003 HC RX 258: Performed by: HOSPITALIST

## 2024-07-05 PROCEDURE — 36415 COLL VENOUS BLD VENIPUNCTURE: CPT

## 2024-07-05 PROCEDURE — 6360000002 HC RX W HCPCS: Performed by: INTERNAL MEDICINE

## 2024-07-05 PROCEDURE — 6370000000 HC RX 637 (ALT 250 FOR IP): Performed by: INTERNAL MEDICINE

## 2024-07-05 PROCEDURE — 6370000000 HC RX 637 (ALT 250 FOR IP): Performed by: HOSPITALIST

## 2024-07-05 PROCEDURE — 2580000003 HC RX 258: Performed by: INTERNAL MEDICINE

## 2024-07-05 PROCEDURE — 1100000003 HC PRIVATE W/ TELEMETRY

## 2024-07-05 PROCEDURE — 85025 COMPLETE CBC W/AUTO DIFF WBC: CPT

## 2024-07-05 PROCEDURE — 80048 BASIC METABOLIC PNL TOTAL CA: CPT

## 2024-07-05 PROCEDURE — 94640 AIRWAY INHALATION TREATMENT: CPT

## 2024-07-05 PROCEDURE — 94761 N-INVAS EAR/PLS OXIMETRY MLT: CPT

## 2024-07-05 PROCEDURE — 83735 ASSAY OF MAGNESIUM: CPT

## 2024-07-05 PROCEDURE — 99232 SBSQ HOSP IP/OBS MODERATE 35: CPT | Performed by: HOSPITALIST

## 2024-07-05 RX ORDER — SODIUM CHLORIDE 9 MG/ML
INJECTION, SOLUTION INTRAVENOUS CONTINUOUS
Status: DISPENSED | OUTPATIENT
Start: 2024-07-05 | End: 2024-07-06

## 2024-07-05 RX ORDER — ENOXAPARIN SODIUM 100 MG/ML
30 INJECTION SUBCUTANEOUS 2 TIMES DAILY
Status: DISCONTINUED | OUTPATIENT
Start: 2024-07-06 | End: 2024-07-09 | Stop reason: HOSPADM

## 2024-07-05 RX ORDER — HYDROMORPHONE HYDROCHLORIDE 1 MG/ML
0.5 INJECTION, SOLUTION INTRAMUSCULAR; INTRAVENOUS; SUBCUTANEOUS EVERY 4 HOURS PRN
Status: DISCONTINUED | OUTPATIENT
Start: 2024-07-05 | End: 2024-07-09 | Stop reason: HOSPADM

## 2024-07-05 RX ORDER — TRAMADOL HYDROCHLORIDE 50 MG/1
100 TABLET ORAL EVERY 8 HOURS PRN
Status: DISCONTINUED | OUTPATIENT
Start: 2024-07-05 | End: 2024-07-09

## 2024-07-05 RX ORDER — LACTOBACILLUS RHAMNOSUS GG 10B CELL
1 CAPSULE ORAL 2 TIMES DAILY
Status: DISCONTINUED | OUTPATIENT
Start: 2024-07-05 | End: 2024-07-09 | Stop reason: HOSPADM

## 2024-07-05 RX ADMIN — DIPHENHYDRAMINE HYDROCHLORIDE 12.5 MG: 50 INJECTION, SOLUTION INTRAMUSCULAR; INTRAVENOUS at 23:42

## 2024-07-05 RX ADMIN — SODIUM CHLORIDE, PRESERVATIVE FREE 10 ML: 5 INJECTION INTRAVENOUS at 21:27

## 2024-07-05 RX ADMIN — ARFORMOTEROL TARTRATE: 15 SOLUTION RESPIRATORY (INHALATION) at 21:42

## 2024-07-05 RX ADMIN — Medication 1 CAPSULE: at 21:26

## 2024-07-05 RX ADMIN — LEVETIRACETAM 1000 MG: 500 TABLET, FILM COATED ORAL at 09:19

## 2024-07-05 RX ADMIN — SERTRALINE 100 MG: 50 TABLET, FILM COATED ORAL at 09:19

## 2024-07-05 RX ADMIN — PANTOPRAZOLE SODIUM 40 MG: 40 TABLET, DELAYED RELEASE ORAL at 05:57

## 2024-07-05 RX ADMIN — TRAMADOL HYDROCHLORIDE 100 MG: 50 TABLET ORAL at 16:34

## 2024-07-05 RX ADMIN — QUETIAPINE FUMARATE 300 MG: 300 TABLET, EXTENDED RELEASE ORAL at 23:11

## 2024-07-05 RX ADMIN — SODIUM CHLORIDE: 9 INJECTION, SOLUTION INTRAVENOUS at 10:04

## 2024-07-05 RX ADMIN — ARFORMOTEROL TARTRATE: 15 SOLUTION RESPIRATORY (INHALATION) at 08:28

## 2024-07-05 RX ADMIN — BUPROPION HYDROCHLORIDE 300 MG: 300 TABLET, EXTENDED RELEASE ORAL at 09:19

## 2024-07-05 RX ADMIN — SODIUM CHLORIDE: 9 INJECTION, SOLUTION INTRAVENOUS at 17:39

## 2024-07-05 RX ADMIN — SODIUM CHLORIDE: 9 INJECTION, SOLUTION INTRAVENOUS at 00:27

## 2024-07-05 RX ADMIN — ATORVASTATIN CALCIUM 40 MG: 40 TABLET, FILM COATED ORAL at 21:27

## 2024-07-05 RX ADMIN — CELECOXIB 100 MG: 100 CAPSULE ORAL at 21:27

## 2024-07-05 RX ADMIN — HYDROMORPHONE HYDROCHLORIDE 0.5 MG: 1 INJECTION, SOLUTION INTRAMUSCULAR; INTRAVENOUS; SUBCUTANEOUS at 23:42

## 2024-07-05 RX ADMIN — CELECOXIB 100 MG: 100 CAPSULE ORAL at 09:19

## 2024-07-05 RX ADMIN — SODIUM CHLORIDE, PRESERVATIVE FREE 10 ML: 5 INJECTION INTRAVENOUS at 09:20

## 2024-07-05 RX ADMIN — ALPRAZOLAM 1 MG: 0.5 TABLET ORAL at 18:16

## 2024-07-05 RX ADMIN — WATER 1000 MG: 1 INJECTION INTRAMUSCULAR; INTRAVENOUS; SUBCUTANEOUS at 23:10

## 2024-07-05 RX ADMIN — HYDROMORPHONE HYDROCHLORIDE 0.5 MG: 1 INJECTION, SOLUTION INTRAMUSCULAR; INTRAVENOUS; SUBCUTANEOUS at 07:23

## 2024-07-05 RX ADMIN — ALPRAZOLAM 1 MG: 0.5 TABLET ORAL at 05:57

## 2024-07-05 RX ADMIN — HYDROMORPHONE HYDROCHLORIDE 0.5 MG: 1 INJECTION, SOLUTION INTRAMUSCULAR; INTRAVENOUS; SUBCUTANEOUS at 12:25

## 2024-07-05 RX ADMIN — DIPHENHYDRAMINE HYDROCHLORIDE 12.5 MG: 50 INJECTION, SOLUTION INTRAMUSCULAR; INTRAVENOUS at 12:25

## 2024-07-05 RX ADMIN — HYDROCODONE BITARTRATE AND ACETAMINOPHEN 1 TABLET: 7.5; 325 TABLET ORAL at 21:25

## 2024-07-05 RX ADMIN — HYDROMORPHONE HYDROCHLORIDE 0.5 MG: 1 INJECTION, SOLUTION INTRAMUSCULAR; INTRAVENOUS; SUBCUTANEOUS at 18:20

## 2024-07-05 RX ADMIN — DIPHENHYDRAMINE HYDROCHLORIDE 12.5 MG: 50 INJECTION, SOLUTION INTRAMUSCULAR; INTRAVENOUS at 07:24

## 2024-07-05 RX ADMIN — TAMSULOSIN HYDROCHLORIDE 0.4 MG: 0.4 CAPSULE ORAL at 09:19

## 2024-07-05 RX ADMIN — GABAPENTIN 400 MG: 400 CAPSULE ORAL at 14:27

## 2024-07-05 RX ADMIN — GABAPENTIN 400 MG: 400 CAPSULE ORAL at 09:19

## 2024-07-05 RX ADMIN — ENOXAPARIN SODIUM 40 MG: 100 INJECTION SUBCUTANEOUS at 09:17

## 2024-07-05 RX ADMIN — LEVETIRACETAM 1000 MG: 500 TABLET, FILM COATED ORAL at 21:26

## 2024-07-05 ASSESSMENT — PAIN DESCRIPTION - PAIN TYPE
TYPE: ACUTE PAIN

## 2024-07-05 ASSESSMENT — PAIN DESCRIPTION - DIRECTION
RADIATING_TOWARDS: LOCALIZED

## 2024-07-05 ASSESSMENT — PAIN DESCRIPTION - LOCATION
LOCATION: ABDOMEN
LOCATION: FLANK
LOCATION: BACK;ABDOMEN;FLANK
LOCATION: ABDOMEN
LOCATION: FLANK

## 2024-07-05 ASSESSMENT — PAIN SCALES - GENERAL
PAINLEVEL_OUTOF10: 9
PAINLEVEL_OUTOF10: 7
PAINLEVEL_OUTOF10: 0
PAINLEVEL_OUTOF10: 8
PAINLEVEL_OUTOF10: 5
PAINLEVEL_OUTOF10: 7
PAINLEVEL_OUTOF10: 5
PAINLEVEL_OUTOF10: 6
PAINLEVEL_OUTOF10: 5
PAINLEVEL_OUTOF10: 8
PAINLEVEL_OUTOF10: 0
PAINLEVEL_OUTOF10: 6
PAINLEVEL_OUTOF10: 8
PAINLEVEL_OUTOF10: 0

## 2024-07-05 ASSESSMENT — PAIN DESCRIPTION - DESCRIPTORS
DESCRIPTORS: SHARP
DESCRIPTORS: ACHING
DESCRIPTORS: ACHING
DESCRIPTORS: SHARP
DESCRIPTORS: ACHING
DESCRIPTORS: SHARP
DESCRIPTORS: ACHING
DESCRIPTORS: SHARP

## 2024-07-05 ASSESSMENT — PAIN DESCRIPTION - FREQUENCY
FREQUENCY: CONTINUOUS

## 2024-07-05 ASSESSMENT — PAIN DESCRIPTION - ONSET
ONSET: ON-GOING

## 2024-07-05 ASSESSMENT — PAIN - FUNCTIONAL ASSESSMENT
PAIN_FUNCTIONAL_ASSESSMENT: PREVENTS OR INTERFERES SOME ACTIVE ACTIVITIES AND ADLS
PAIN_FUNCTIONAL_ASSESSMENT: ACTIVITIES ARE NOT PREVENTED
PAIN_FUNCTIONAL_ASSESSMENT: PREVENTS OR INTERFERES SOME ACTIVE ACTIVITIES AND ADLS
PAIN_FUNCTIONAL_ASSESSMENT: ACTIVITIES ARE NOT PREVENTED
PAIN_FUNCTIONAL_ASSESSMENT: ACTIVITIES ARE NOT PREVENTED

## 2024-07-05 ASSESSMENT — PAIN DESCRIPTION - ORIENTATION
ORIENTATION: LEFT;LOWER
ORIENTATION: LEFT
ORIENTATION: LEFT;LOWER;ANTERIOR
ORIENTATION: LEFT
ORIENTATION: LEFT;LOWER
ORIENTATION: LEFT;LOWER
ORIENTATION: LEFT
ORIENTATION: LEFT;LOWER

## 2024-07-05 NOTE — CARE COORDINATION
CM met with pt to discuss discharge planning, pt verified demographics. Pt is independent lives with her children and grand children. Will need PCP on dc, currently has no needs, she has great family support. They will transport home when medically stable.    Dayanna BRISCOEN RN  Case Management

## 2024-07-05 NOTE — PLAN OF CARE
Problem: Pain  Goal: Verbalizes/displays adequate comfort level or baseline comfort level  7/5/2024 1111 by Aarti Garcia RN  Outcome: Progressing  Flowsheets (Taken 7/5/2024 0805)  Verbalizes/displays adequate comfort level or baseline comfort level:   Encourage patient to monitor pain and request assistance   Assess pain using appropriate pain scale   Administer analgesics based on type and severity of pain and evaluate response   Implement non-pharmacological measures as appropriate and evaluate response   Consider cultural and social influences on pain and pain management  7/5/2024 0153 by Marina Conte RN  Outcome: Progressing     Problem: Safety - Adult  Goal: Free from fall injury  7/5/2024 1111 by Aarti Garcia RN  Outcome: Progressing  7/5/2024 0153 by Marina Conte RN  Outcome: Progressing

## 2024-07-05 NOTE — CONSULTS
No diagnosis found.      ASSESSMENT:  -Nephrolithiasis.   Surgical History:  S/p cysto; left RPG; left double j ureteral stent placement on 05/25/24 by Dr. Ochoa.  S/p cysto, left retrograde pyelogram, left double-J ureteral stent exchange (7 f x 26cm) on 06/01/24 with Dr. Agrawal  CT A/P 05/24/24: There is a 5 x 3 mm distal left ureteral stone. Presently, not causing obstruction.  Additional nonobstructing left kidney stones.  Previous right nephrectomy.  CT AP 7/3/24: Stable nonobstructing left renal stone. Left ureteral stent in place, without  hydronephrosis.    PLAN:  Appreciate overall management per medicine   CT AP 7/3/24 reviewed stent in appropriate position  Per chart review pt has been difficulty to reach to further coordinate medical clearance, office appts etc for OP stone treatment. Multiple ER visits noted  Pt adamant to have stent removed-pt with solitary kidney. Recommend IP stone tx given issues with compliance.   Ucx pending, keep on broad spectrum abx  Obtain repeat Ucx Sat AM for possible OR Monday     Amelia Richard NP  [facility]     Follow up-pending hospital course         HISTORY OF PRESENT ILLNESS:   Garrison Buenrostro is a 53 y.o. female with a history of nephrolithiasis. Bounce back ER visits with acute pain. Now notes worsening nausea and decreased appetite.         Past Medical History:   Diagnosis Date    PTSD (post-traumatic stress disorder)     Sciatica     Seizures (HCC)        Past Surgical History:   Procedure Laterality Date    APPENDECTOMY      CYSTOSCOPY Left 5/25/2024    CYSTOSCOPY ; LEFT RETROGRADE PYELOGRAM; LEFT DOUBLE J URETERAL STENT INSERTION performed by Junior Ochoa MD at [facility] MAIN OR    CYSTOSCOPY Left 6/1/2024    CYSTOSCOPY LEFT RETROGRADE PYELOGRAM ,  LEFT DOUBLE J STENT EXCHANGE performed by Alessio Agrawal MD at [facility] MAIN OR    KIDNEY REMOVAL Right     2014    LEG SURGERY Left        Social History     Tobacco Use    Smoking status: Never     
  HGB 11.5* 10.9* 9.9*   HCT 34.7* 34.1* 31.5*    251 235      Microbiology Results       Procedure Component Value Units Date/Time    Culture, Urine [[id number]] Collected: 07/04/24 2125    Order Status: No result Updated: 07/05/24 0000    Urine Culture [[id number]] Collected: 07/03/24 1740    Order Status: Completed Specimen: Urine, clean catch Updated: 07/04/24 2220     Special Requests NO SPECIAL REQUESTS        Culture No growth (<1,000 CFU/ML)       Urine Culture [[id number]] Collected: 06/27/24 1913    Order Status: Completed Specimen: Urine, clean catch Updated: 06/28/24 2047     Special Requests NO SPECIAL REQUESTS        Culture No growth (<1,000 CFU/ML)                   RADIOLOGY:    All available imaging studies/reports in [facility] for this admission were reviewed      Disclaimer: Sections of this note are dictated utilizing voice recognition software, which may have resulted in some phonetic based errors in grammar and contents. Even though attempts were made to correct all the mistakes, some may have been missed, and remained in the body of the document. If questions arise, please contact our department.    Dr. Freida Thomas, Infectious Disease Specialist  [phone]  July 5, 2024  8:52 AM

## 2024-07-05 NOTE — PLAN OF CARE
Problem: Safety - Adult  Goal: Free from fall injury  7/5/2024 0153 by Marina Conte RN  Outcome: Progressing  7/4/2024 1838 by Johana Glasgow RN  Outcome: Progressing     Problem: Pain  Goal: Verbalizes/displays adequate comfort level or baseline comfort level  7/5/2024 0153 by Marina Conte RN  Outcome: Progressing  7/4/2024 1838 by Johana Glasgow RN  Outcome: Progressing

## 2024-07-06 LAB
BACTERIA SPEC CULT: NORMAL
SERVICE CMNT-IMP: NORMAL

## 2024-07-06 PROCEDURE — 1100000003 HC PRIVATE W/ TELEMETRY

## 2024-07-06 PROCEDURE — 6360000002 HC RX W HCPCS: Performed by: INTERNAL MEDICINE

## 2024-07-06 PROCEDURE — 6370000000 HC RX 637 (ALT 250 FOR IP): Performed by: HOSPITALIST

## 2024-07-06 PROCEDURE — 6360000002 HC RX W HCPCS: Performed by: HOSPITALIST

## 2024-07-06 PROCEDURE — 2580000003 HC RX 258: Performed by: INTERNAL MEDICINE

## 2024-07-06 PROCEDURE — 6370000000 HC RX 637 (ALT 250 FOR IP): Performed by: INTERNAL MEDICINE

## 2024-07-06 PROCEDURE — 94761 N-INVAS EAR/PLS OXIMETRY MLT: CPT

## 2024-07-06 PROCEDURE — 94640 AIRWAY INHALATION TREATMENT: CPT

## 2024-07-06 PROCEDURE — 2700000000 HC OXYGEN THERAPY PER DAY

## 2024-07-06 PROCEDURE — 87086 URINE CULTURE/COLONY COUNT: CPT

## 2024-07-06 PROCEDURE — 99232 SBSQ HOSP IP/OBS MODERATE 35: CPT | Performed by: HOSPITALIST

## 2024-07-06 RX ORDER — BUDESONIDE 0.25 MG/2ML
0.25 INHALANT ORAL
Status: DISCONTINUED | OUTPATIENT
Start: 2024-07-06 | End: 2024-07-09 | Stop reason: HOSPADM

## 2024-07-06 RX ORDER — ARFORMOTEROL TARTRATE 15 UG/2ML
15 SOLUTION RESPIRATORY (INHALATION)
Status: DISCONTINUED | OUTPATIENT
Start: 2024-07-06 | End: 2024-07-09 | Stop reason: HOSPADM

## 2024-07-06 RX ADMIN — GABAPENTIN 400 MG: 400 CAPSULE ORAL at 13:49

## 2024-07-06 RX ADMIN — SODIUM CHLORIDE, PRESERVATIVE FREE 10 ML: 5 INJECTION INTRAVENOUS at 21:27

## 2024-07-06 RX ADMIN — HYDROMORPHONE HYDROCHLORIDE 0.5 MG: 1 INJECTION, SOLUTION INTRAMUSCULAR; INTRAVENOUS; SUBCUTANEOUS at 21:22

## 2024-07-06 RX ADMIN — HYDROMORPHONE HYDROCHLORIDE 0.5 MG: 1 INJECTION, SOLUTION INTRAMUSCULAR; INTRAVENOUS; SUBCUTANEOUS at 14:30

## 2024-07-06 RX ADMIN — CELECOXIB 100 MG: 100 CAPSULE ORAL at 09:03

## 2024-07-06 RX ADMIN — QUETIAPINE FUMARATE 300 MG: 300 TABLET, EXTENDED RELEASE ORAL at 21:23

## 2024-07-06 RX ADMIN — CELECOXIB 100 MG: 100 CAPSULE ORAL at 21:23

## 2024-07-06 RX ADMIN — SERTRALINE 100 MG: 50 TABLET, FILM COATED ORAL at 09:04

## 2024-07-06 RX ADMIN — DIPHENHYDRAMINE HYDROCHLORIDE 12.5 MG: 50 INJECTION, SOLUTION INTRAMUSCULAR; INTRAVENOUS at 09:11

## 2024-07-06 RX ADMIN — HYDROMORPHONE HYDROCHLORIDE 0.5 MG: 1 INJECTION, SOLUTION INTRAMUSCULAR; INTRAVENOUS; SUBCUTANEOUS at 09:04

## 2024-07-06 RX ADMIN — ARFORMOTEROL TARTRATE: 15 SOLUTION RESPIRATORY (INHALATION) at 07:50

## 2024-07-06 RX ADMIN — Medication 1 CAPSULE: at 21:23

## 2024-07-06 RX ADMIN — Medication 1 CAPSULE: at 09:03

## 2024-07-06 RX ADMIN — ZOLPIDEM TARTRATE 5 MG: 5 TABLET ORAL at 02:09

## 2024-07-06 RX ADMIN — ENOXAPARIN SODIUM 30 MG: 100 INJECTION SUBCUTANEOUS at 21:26

## 2024-07-06 RX ADMIN — BUPROPION HYDROCHLORIDE 300 MG: 300 TABLET, EXTENDED RELEASE ORAL at 09:03

## 2024-07-06 RX ADMIN — BUDESONIDE 250 MCG: 0.25 INHALANT RESPIRATORY (INHALATION) at 21:06

## 2024-07-06 RX ADMIN — TAMSULOSIN HYDROCHLORIDE 0.4 MG: 0.4 CAPSULE ORAL at 09:02

## 2024-07-06 RX ADMIN — DIPHENHYDRAMINE HYDROCHLORIDE 12.5 MG: 50 INJECTION, SOLUTION INTRAMUSCULAR; INTRAVENOUS at 21:22

## 2024-07-06 RX ADMIN — LEVETIRACETAM 1000 MG: 500 TABLET, FILM COATED ORAL at 21:23

## 2024-07-06 RX ADMIN — PANTOPRAZOLE SODIUM 40 MG: 40 TABLET, DELAYED RELEASE ORAL at 06:07

## 2024-07-06 RX ADMIN — ATORVASTATIN CALCIUM 40 MG: 40 TABLET, FILM COATED ORAL at 21:23

## 2024-07-06 RX ADMIN — ALPRAZOLAM 1 MG: 0.5 TABLET ORAL at 21:43

## 2024-07-06 RX ADMIN — DIPHENHYDRAMINE HYDROCHLORIDE 12.5 MG: 50 INJECTION, SOLUTION INTRAMUSCULAR; INTRAVENOUS at 14:29

## 2024-07-06 RX ADMIN — TRAMADOL HYDROCHLORIDE 100 MG: 50 TABLET ORAL at 02:15

## 2024-07-06 RX ADMIN — ENOXAPARIN SODIUM 30 MG: 100 INJECTION SUBCUTANEOUS at 09:02

## 2024-07-06 RX ADMIN — TEMAZEPAM 30 MG: 15 CAPSULE ORAL at 02:09

## 2024-07-06 RX ADMIN — GABAPENTIN 400 MG: 400 CAPSULE ORAL at 09:04

## 2024-07-06 RX ADMIN — GABAPENTIN 400 MG: 400 CAPSULE ORAL at 21:43

## 2024-07-06 RX ADMIN — HYDROMORPHONE HYDROCHLORIDE 0.5 MG: 1 INJECTION, SOLUTION INTRAMUSCULAR; INTRAVENOUS; SUBCUTANEOUS at 04:57

## 2024-07-06 RX ADMIN — LEVETIRACETAM 1000 MG: 500 TABLET, FILM COATED ORAL at 09:04

## 2024-07-06 RX ADMIN — SODIUM CHLORIDE, PRESERVATIVE FREE 10 ML: 5 INJECTION INTRAVENOUS at 09:12

## 2024-07-06 RX ADMIN — ARFORMOTEROL TARTRATE 15 MCG: 15 SOLUTION RESPIRATORY (INHALATION) at 21:06

## 2024-07-06 ASSESSMENT — PAIN SCALES - GENERAL
PAINLEVEL_OUTOF10: 9
PAINLEVEL_OUTOF10: 3
PAINLEVEL_OUTOF10: 9
PAINLEVEL_OUTOF10: 7
PAINLEVEL_OUTOF10: 3
PAINLEVEL_OUTOF10: 8
PAINLEVEL_OUTOF10: 10
PAINLEVEL_OUTOF10: 5
PAINLEVEL_OUTOF10: 9
PAINLEVEL_OUTOF10: 9
PAINLEVEL_OUTOF10: 0
PAINLEVEL_OUTOF10: 0
PAINLEVEL_OUTOF10: 3
PAINLEVEL_OUTOF10: 7
PAINLEVEL_OUTOF10: 0

## 2024-07-06 ASSESSMENT — PAIN DESCRIPTION - DESCRIPTORS
DESCRIPTORS: SHARP
DESCRIPTORS: DULL;CRAMPING
DESCRIPTORS: SHARP;STABBING;THROBBING
DESCRIPTORS: ACHING
DESCRIPTORS: ACHING;SHARP
DESCRIPTORS: DULL;POUNDING;PRESSURE
DESCRIPTORS: SHARP

## 2024-07-06 ASSESSMENT — PAIN - FUNCTIONAL ASSESSMENT
PAIN_FUNCTIONAL_ASSESSMENT: ACTIVITIES ARE NOT PREVENTED
PAIN_FUNCTIONAL_ASSESSMENT: PREVENTS OR INTERFERES SOME ACTIVE ACTIVITIES AND ADLS
PAIN_FUNCTIONAL_ASSESSMENT: ACTIVITIES ARE NOT PREVENTED
PAIN_FUNCTIONAL_ASSESSMENT: ACTIVITIES ARE NOT PREVENTED
PAIN_FUNCTIONAL_ASSESSMENT: PREVENTS OR INTERFERES SOME ACTIVE ACTIVITIES AND ADLS
PAIN_FUNCTIONAL_ASSESSMENT: ACTIVITIES ARE NOT PREVENTED
PAIN_FUNCTIONAL_ASSESSMENT: PREVENTS OR INTERFERES WITH MANY ACTIVE NOT PASSIVE ACTIVITIES
PAIN_FUNCTIONAL_ASSESSMENT: PREVENTS OR INTERFERES WITH ALL ACTIVE AND SOME PASSIVE ACTIVITIES

## 2024-07-06 ASSESSMENT — PAIN DESCRIPTION - ORIENTATION
ORIENTATION: LEFT
ORIENTATION: LEFT;LOWER
ORIENTATION: LEFT
ORIENTATION: LEFT;LOWER
ORIENTATION: LEFT
ORIENTATION: LEFT;LOWER
ORIENTATION: LEFT
ORIENTATION: LEFT

## 2024-07-06 ASSESSMENT — PAIN DESCRIPTION - DIRECTION
RADIATING_TOWARDS: LOWER BACK
RADIATING_TOWARDS: THIGH
RADIATING_TOWARDS: THIGH
RADIATING_TOWARDS: LOCALIZED
RADIATING_TOWARDS: THIGH

## 2024-07-06 ASSESSMENT — PAIN DESCRIPTION - PAIN TYPE
TYPE: ACUTE PAIN

## 2024-07-06 ASSESSMENT — PAIN DESCRIPTION - FREQUENCY
FREQUENCY: INTERMITTENT
FREQUENCY: CONTINUOUS
FREQUENCY: INTERMITTENT

## 2024-07-06 ASSESSMENT — PAIN DESCRIPTION - LOCATION
LOCATION: ABDOMEN
LOCATION: FLANK
LOCATION: ABDOMEN
LOCATION: ABDOMEN
LOCATION: FLANK
LOCATION: ABDOMEN
LOCATION: ABDOMEN
LOCATION: FLANK
LOCATION: FLANK

## 2024-07-06 ASSESSMENT — PAIN DESCRIPTION - ONSET
ONSET: ON-GOING

## 2024-07-06 NOTE — PLAN OF CARE
Problem: Pain  Goal: Verbalizes/displays adequate comfort level or baseline comfort level  7/6/2024 1055 by Aarti Garcia, RN  Outcome: Progressing  Flowsheets (Taken 7/6/2024 0903)  Verbalizes/displays adequate comfort level or baseline comfort level:   Encourage patient to monitor pain and request assistance   Assess pain using appropriate pain scale   Administer analgesics based on type and severity of pain and evaluate response   Implement non-pharmacological measures as appropriate and evaluate response   Consider cultural and social influences on pain and pain management  7/6/2024 0435 by Ruth Wick, RN  Outcome: Progressing     Problem: Safety - Adult  Goal: Free from fall injury  7/6/2024 1055 by Aarti Garcia, RN  Outcome: Progressing  7/6/2024 0435 by Ruth Wick, RN  Outcome: Progressing     Problem: Nutrition Deficit:  Goal: Optimize nutritional status  Outcome: Progressing

## 2024-07-07 ENCOUNTER — ANESTHESIA EVENT (OUTPATIENT)
Facility: HOSPITAL | Age: 54
DRG: 446 | End: 2024-07-07
Payer: MEDICAID

## 2024-07-07 LAB
BACTERIA SPEC CULT: NORMAL
SERVICE CMNT-IMP: NORMAL

## 2024-07-07 PROCEDURE — 6370000000 HC RX 637 (ALT 250 FOR IP): Performed by: HOSPITALIST

## 2024-07-07 PROCEDURE — 1100000003 HC PRIVATE W/ TELEMETRY

## 2024-07-07 PROCEDURE — 6370000000 HC RX 637 (ALT 250 FOR IP): Performed by: INTERNAL MEDICINE

## 2024-07-07 PROCEDURE — 99232 SBSQ HOSP IP/OBS MODERATE 35: CPT | Performed by: HOSPITALIST

## 2024-07-07 PROCEDURE — 6360000002 HC RX W HCPCS: Performed by: INTERNAL MEDICINE

## 2024-07-07 PROCEDURE — 05HF33Z INSERTION OF INFUSION DEVICE INTO LEFT CEPHALIC VEIN, PERCUTANEOUS APPROACH: ICD-10-PCS | Performed by: HOSPITALIST

## 2024-07-07 PROCEDURE — 94640 AIRWAY INHALATION TREATMENT: CPT

## 2024-07-07 PROCEDURE — 6360000002 HC RX W HCPCS: Performed by: HOSPITALIST

## 2024-07-07 PROCEDURE — 36410 VNPNXR 3YR/> PHY/QHP DX/THER: CPT

## 2024-07-07 PROCEDURE — 94761 N-INVAS EAR/PLS OXIMETRY MLT: CPT

## 2024-07-07 PROCEDURE — 2580000003 HC RX 258: Performed by: INTERNAL MEDICINE

## 2024-07-07 RX ADMIN — DIPHENHYDRAMINE HYDROCHLORIDE 12.5 MG: 50 INJECTION, SOLUTION INTRAMUSCULAR; INTRAVENOUS at 15:07

## 2024-07-07 RX ADMIN — SERTRALINE 100 MG: 50 TABLET, FILM COATED ORAL at 08:53

## 2024-07-07 RX ADMIN — WATER 1000 MG: 1 INJECTION INTRAMUSCULAR; INTRAVENOUS; SUBCUTANEOUS at 20:25

## 2024-07-07 RX ADMIN — Medication 1 CAPSULE: at 08:53

## 2024-07-07 RX ADMIN — GABAPENTIN 400 MG: 400 CAPSULE ORAL at 08:53

## 2024-07-07 RX ADMIN — GABAPENTIN 400 MG: 400 CAPSULE ORAL at 20:28

## 2024-07-07 RX ADMIN — HYDROMORPHONE HYDROCHLORIDE 0.5 MG: 1 INJECTION, SOLUTION INTRAMUSCULAR; INTRAVENOUS; SUBCUTANEOUS at 20:22

## 2024-07-07 RX ADMIN — ATORVASTATIN CALCIUM 40 MG: 40 TABLET, FILM COATED ORAL at 20:28

## 2024-07-07 RX ADMIN — WATER 1000 MG: 1 INJECTION INTRAMUSCULAR; INTRAVENOUS; SUBCUTANEOUS at 05:34

## 2024-07-07 RX ADMIN — HYDROMORPHONE HYDROCHLORIDE 0.5 MG: 1 INJECTION, SOLUTION INTRAMUSCULAR; INTRAVENOUS; SUBCUTANEOUS at 15:07

## 2024-07-07 RX ADMIN — DIPHENHYDRAMINE HYDROCHLORIDE 12.5 MG: 50 INJECTION, SOLUTION INTRAMUSCULAR; INTRAVENOUS at 20:24

## 2024-07-07 RX ADMIN — Medication 1 CAPSULE: at 20:28

## 2024-07-07 RX ADMIN — HYDROMORPHONE HYDROCHLORIDE 0.5 MG: 1 INJECTION, SOLUTION INTRAMUSCULAR; INTRAVENOUS; SUBCUTANEOUS at 10:26

## 2024-07-07 RX ADMIN — SODIUM CHLORIDE, PRESERVATIVE FREE 10 ML: 5 INJECTION INTRAVENOUS at 20:32

## 2024-07-07 RX ADMIN — GABAPENTIN 400 MG: 400 CAPSULE ORAL at 14:08

## 2024-07-07 RX ADMIN — LEVETIRACETAM 1000 MG: 500 TABLET, FILM COATED ORAL at 08:53

## 2024-07-07 RX ADMIN — LEVETIRACETAM 1000 MG: 500 TABLET, FILM COATED ORAL at 20:28

## 2024-07-07 RX ADMIN — HYDROMORPHONE HYDROCHLORIDE 0.5 MG: 1 INJECTION, SOLUTION INTRAMUSCULAR; INTRAVENOUS; SUBCUTANEOUS at 05:33

## 2024-07-07 RX ADMIN — DIPHENHYDRAMINE HYDROCHLORIDE 12.5 MG: 50 INJECTION, SOLUTION INTRAMUSCULAR; INTRAVENOUS at 10:26

## 2024-07-07 RX ADMIN — BUPROPION HYDROCHLORIDE 300 MG: 300 TABLET, EXTENDED RELEASE ORAL at 08:53

## 2024-07-07 RX ADMIN — ALPRAZOLAM 1 MG: 0.5 TABLET ORAL at 22:28

## 2024-07-07 RX ADMIN — BUDESONIDE 250 MCG: 0.25 INHALANT RESPIRATORY (INHALATION) at 07:15

## 2024-07-07 RX ADMIN — CELECOXIB 100 MG: 100 CAPSULE ORAL at 20:28

## 2024-07-07 RX ADMIN — BUDESONIDE 250 MCG: 0.25 INHALANT RESPIRATORY (INHALATION) at 20:54

## 2024-07-07 RX ADMIN — ARFORMOTEROL TARTRATE 15 MCG: 15 SOLUTION RESPIRATORY (INHALATION) at 20:54

## 2024-07-07 RX ADMIN — DIPHENHYDRAMINE HYDROCHLORIDE 12.5 MG: 50 INJECTION, SOLUTION INTRAMUSCULAR; INTRAVENOUS at 05:33

## 2024-07-07 RX ADMIN — HYDROCODONE BITARTRATE AND ACETAMINOPHEN 1 TABLET: 7.5; 325 TABLET ORAL at 17:40

## 2024-07-07 RX ADMIN — SODIUM CHLORIDE, PRESERVATIVE FREE 10 ML: 5 INJECTION INTRAVENOUS at 08:54

## 2024-07-07 RX ADMIN — QUETIAPINE FUMARATE 300 MG: 300 TABLET, EXTENDED RELEASE ORAL at 22:28

## 2024-07-07 RX ADMIN — TAMSULOSIN HYDROCHLORIDE 0.4 MG: 0.4 CAPSULE ORAL at 08:53

## 2024-07-07 RX ADMIN — PANTOPRAZOLE SODIUM 40 MG: 40 TABLET, DELAYED RELEASE ORAL at 08:53

## 2024-07-07 RX ADMIN — ARFORMOTEROL TARTRATE 15 MCG: 15 SOLUTION RESPIRATORY (INHALATION) at 07:15

## 2024-07-07 RX ADMIN — CELECOXIB 100 MG: 100 CAPSULE ORAL at 08:53

## 2024-07-07 ASSESSMENT — PAIN DESCRIPTION - DIRECTION
RADIATING_TOWARDS: LOWER BACK
RADIATING_TOWARDS: NOT RADIATING
RADIATING_TOWARDS: LOWER BACK
RADIATING_TOWARDS: LOWER BACK
RADIATING_TOWARDS: NOT RADIATING

## 2024-07-07 ASSESSMENT — PAIN SCALES - GENERAL
PAINLEVEL_OUTOF10: 0
PAINLEVEL_OUTOF10: 8
PAINLEVEL_OUTOF10: 7
PAINLEVEL_OUTOF10: 8
PAINLEVEL_OUTOF10: 0
PAINLEVEL_OUTOF10: 3
PAINLEVEL_OUTOF10: 7
PAINLEVEL_OUTOF10: 10
PAINLEVEL_OUTOF10: 0
PAINLEVEL_OUTOF10: 0

## 2024-07-07 ASSESSMENT — PAIN DESCRIPTION - LOCATION
LOCATION: ABDOMEN
LOCATION: BACK
LOCATION: ABDOMEN
LOCATION: ABDOMEN

## 2024-07-07 ASSESSMENT — PAIN DESCRIPTION - FREQUENCY
FREQUENCY: INTERMITTENT

## 2024-07-07 ASSESSMENT — PAIN DESCRIPTION - ONSET
ONSET: GRADUAL
ONSET: ON-GOING
ONSET: GRADUAL

## 2024-07-07 ASSESSMENT — PAIN DESCRIPTION - PAIN TYPE
TYPE: ACUTE PAIN

## 2024-07-07 ASSESSMENT — PAIN - FUNCTIONAL ASSESSMENT
PAIN_FUNCTIONAL_ASSESSMENT: INTOLERABLE, UNABLE TO DO ANY ACTIVE OR PASSIVE ACTIVITIES
PAIN_FUNCTIONAL_ASSESSMENT: PREVENTS OR INTERFERES SOME ACTIVE ACTIVITIES AND ADLS
PAIN_FUNCTIONAL_ASSESSMENT: ACTIVITIES ARE NOT PREVENTED

## 2024-07-07 ASSESSMENT — PAIN DESCRIPTION - DESCRIPTORS
DESCRIPTORS: SHARP
DESCRIPTORS: ACHING
DESCRIPTORS: SHARP;SPASM
DESCRIPTORS: ACHING

## 2024-07-07 ASSESSMENT — PAIN DESCRIPTION - ORIENTATION
ORIENTATION: RIGHT;LEFT;LOWER
ORIENTATION: LEFT;LOWER
ORIENTATION: LOWER
ORIENTATION: LEFT;LOWER

## 2024-07-07 NOTE — PLAN OF CARE
Pt's bed is low and locked, and call bell as well as frequently used items are within reach. Bed alarm is in use, and pt is instructed to call for a stand-by assist for safety before she gets out of bed.

## 2024-07-07 NOTE — PLAN OF CARE
Problem: Pain  Goal: Verbalizes/displays adequate comfort level or baseline comfort level  7/7/2024 0935 by Aarti Garcia RN  Outcome: Progressing  7/7/2024 0805 by Felicita Cruz RN  Outcome: Progressing  Flowsheets  Taken 7/7/2024 0745 by Aarti Garcia RN  Verbalizes/displays adequate comfort level or baseline comfort level:   Encourage patient to monitor pain and request assistance   Assess pain using appropriate pain scale   Administer analgesics based on type and severity of pain and evaluate response   Implement non-pharmacological measures as appropriate and evaluate response   Consider cultural and social influences on pain and pain management  Taken 7/6/2024 2000 by Felicita Cruz RN  Verbalizes/displays adequate comfort level or baseline comfort level:   Encourage patient to monitor pain and request assistance   Assess pain using appropriate pain scale   Administer analgesics based on type and severity of pain and evaluate response   Implement non-pharmacological measures as appropriate and evaluate response   Consider cultural and social influences on pain and pain management   Notify Licensed Independent Practitioner if interventions unsuccessful or patient reports new pain     Problem: Safety - Adult  Goal: Free from fall injury  7/7/2024 0935 by Aarti Garcia RN  Outcome: Progressing  7/7/2024 0805 by Felicita Cruz RN  Outcome: Progressing     Problem: Nutrition Deficit:  Goal: Optimize nutritional status  7/7/2024 0935 by Aarti Garcia RN  Outcome: Progressing  7/7/2024 0805 by Felicita Cruz RN  Outcome: Progressing

## 2024-07-07 NOTE — PROCEDURES
MID Line Insertion Procedure Note    Procedure: Insertion of #4 FR/18G Midline    Indications:  IV therapy, Poor Access    Procedure Details   Informed consent was obtained for the procedure, including sedation.  Risks of lung perforation, hemorrhage, and adverse drug reaction were discussed.     #4 FR/18G Midline inserted to the L Cephalic vein per hospital protocol.   Blood return:  yes    Findings:  Catheter inserted to 14 cm, with 0 cm. Exposed. Mid upper arm circumference is 39 cm.  There were no changes to vital signs. Catheter was flushed with 10 cc NS. Patient did tolerate procedure well.    Recommendations:  OKAY to use now.   PICC Brochure given to patient with teaching instruction.PROCEDURE NOTE  Date: 7/7/2024   Name: Garrison Buenrostro  YOB: 1970    Procedures

## 2024-07-08 ENCOUNTER — APPOINTMENT (OUTPATIENT)
Facility: HOSPITAL | Age: 54
DRG: 446 | End: 2024-07-08
Payer: MEDICAID

## 2024-07-08 ENCOUNTER — ANESTHESIA (OUTPATIENT)
Facility: HOSPITAL | Age: 54
DRG: 446 | End: 2024-07-08
Payer: MEDICAID

## 2024-07-08 LAB
ANION GAP SERPL CALC-SCNC: 6 MMOL/L (ref 3–18)
BASOPHILS # BLD: 0 K/UL (ref 0–0.1)
BASOPHILS NFR BLD: 1 % (ref 0–2)
BUN SERPL-MCNC: 9 MG/DL (ref 7–18)
BUN/CREAT SERPL: 13 (ref 12–20)
CALCIUM SERPL-MCNC: 8.8 MG/DL (ref 8.5–10.1)
CHLORIDE SERPL-SCNC: 107 MMOL/L (ref 100–111)
CO2 SERPL-SCNC: 29 MMOL/L (ref 21–32)
CREAT SERPL-MCNC: 0.69 MG/DL (ref 0.6–1.3)
DIFFERENTIAL METHOD BLD: ABNORMAL
EOSINOPHIL # BLD: 0.2 K/UL (ref 0–0.4)
EOSINOPHIL NFR BLD: 4 % (ref 0–5)
ERYTHROCYTE [DISTWIDTH] IN BLOOD BY AUTOMATED COUNT: 13.2 % (ref 11.6–14.5)
GLUCOSE SERPL-MCNC: 92 MG/DL (ref 74–99)
HCG UR QL: NEGATIVE
HCT VFR BLD AUTO: 32.4 % (ref 35–45)
HGB BLD-MCNC: 10.8 G/DL (ref 12–16)
IMM GRANULOCYTES # BLD AUTO: 0 K/UL (ref 0–0.04)
IMM GRANULOCYTES NFR BLD AUTO: 0 % (ref 0–0.5)
LYMPHOCYTES # BLD: 2.2 K/UL (ref 0.9–3.6)
LYMPHOCYTES NFR BLD: 52 % (ref 21–52)
MAGNESIUM SERPL-MCNC: 1.8 MG/DL (ref 1.6–2.6)
MCH RBC QN AUTO: 30.2 PG (ref 24–34)
MCHC RBC AUTO-ENTMCNC: 33.3 G/DL (ref 31–37)
MCV RBC AUTO: 90.5 FL (ref 78–100)
MONOCYTES # BLD: 0.2 K/UL (ref 0.05–1.2)
MONOCYTES NFR BLD: 5 % (ref 3–10)
NEUTS SEG # BLD: 1.6 K/UL (ref 1.8–8)
NEUTS SEG NFR BLD: 38 % (ref 40–73)
NRBC # BLD: 0 K/UL (ref 0–0.01)
NRBC BLD-RTO: 0 PER 100 WBC
PLATELET # BLD AUTO: 241 K/UL (ref 135–420)
PMV BLD AUTO: 9.9 FL (ref 9.2–11.8)
POTASSIUM SERPL-SCNC: 4 MMOL/L (ref 3.5–5.5)
RBC # BLD AUTO: 3.58 M/UL (ref 4.2–5.3)
SODIUM SERPL-SCNC: 142 MMOL/L (ref 136–145)
WBC # BLD AUTO: 4.2 K/UL (ref 4.6–13.2)

## 2024-07-08 PROCEDURE — C2617 STENT, NON-COR, TEM W/O DEL: HCPCS | Performed by: UROLOGY

## 2024-07-08 PROCEDURE — 80048 BASIC METABOLIC PNL TOTAL CA: CPT

## 2024-07-08 PROCEDURE — 3600000003 HC SURGERY LEVEL 3 BASE: Performed by: UROLOGY

## 2024-07-08 PROCEDURE — 6360000002 HC RX W HCPCS: Performed by: HOSPITALIST

## 2024-07-08 PROCEDURE — 81025 URINE PREGNANCY TEST: CPT

## 2024-07-08 PROCEDURE — 85025 COMPLETE CBC W/AUTO DIFF WBC: CPT

## 2024-07-08 PROCEDURE — 83735 ASSAY OF MAGNESIUM: CPT

## 2024-07-08 PROCEDURE — 2500000003 HC RX 250 WO HCPCS: Performed by: NURSE ANESTHETIST, CERTIFIED REGISTERED

## 2024-07-08 PROCEDURE — 6360000002 HC RX W HCPCS: Performed by: NURSE ANESTHETIST, CERTIFIED REGISTERED

## 2024-07-08 PROCEDURE — 0TC78ZZ EXTIRPATION OF MATTER FROM LEFT URETER, VIA NATURAL OR ARTIFICIAL OPENING ENDOSCOPIC: ICD-10-PCS | Performed by: UROLOGY

## 2024-07-08 PROCEDURE — 2580000003 HC RX 258: Performed by: INTERNAL MEDICINE

## 2024-07-08 PROCEDURE — 7100000001 HC PACU RECOVERY - ADDTL 15 MIN: Performed by: UROLOGY

## 2024-07-08 PROCEDURE — BT1F1ZZ FLUOROSCOPY OF LEFT KIDNEY, URETER AND BLADDER USING LOW OSMOLAR CONTRAST: ICD-10-PCS | Performed by: UROLOGY

## 2024-07-08 PROCEDURE — 3700000001 HC ADD 15 MINUTES (ANESTHESIA): Performed by: UROLOGY

## 2024-07-08 PROCEDURE — 6370000000 HC RX 637 (ALT 250 FOR IP): Performed by: NURSE ANESTHETIST, CERTIFIED REGISTERED

## 2024-07-08 PROCEDURE — 74420 UROGRAPHY RTRGR +-KUB: CPT

## 2024-07-08 PROCEDURE — C1894 INTRO/SHEATH, NON-LASER: HCPCS | Performed by: UROLOGY

## 2024-07-08 PROCEDURE — 99232 SBSQ HOSP IP/OBS MODERATE 35: CPT | Performed by: HOSPITALIST

## 2024-07-08 PROCEDURE — 2580000003 HC RX 258: Performed by: NURSE ANESTHETIST, CERTIFIED REGISTERED

## 2024-07-08 PROCEDURE — 3700000000 HC ANESTHESIA ATTENDED CARE: Performed by: UROLOGY

## 2024-07-08 PROCEDURE — 6370000000 HC RX 637 (ALT 250 FOR IP): Performed by: HOSPITALIST

## 2024-07-08 PROCEDURE — 7100000000 HC PACU RECOVERY - FIRST 15 MIN: Performed by: UROLOGY

## 2024-07-08 PROCEDURE — 6370000000 HC RX 637 (ALT 250 FOR IP): Performed by: INTERNAL MEDICINE

## 2024-07-08 PROCEDURE — 6360000002 HC RX W HCPCS: Performed by: INTERNAL MEDICINE

## 2024-07-08 PROCEDURE — 1100000003 HC PRIVATE W/ TELEMETRY

## 2024-07-08 PROCEDURE — 0T778DZ DILATION OF LEFT URETER WITH INTRALUMINAL DEVICE, VIA NATURAL OR ARTIFICIAL OPENING ENDOSCOPIC: ICD-10-PCS | Performed by: UROLOGY

## 2024-07-08 PROCEDURE — 2580000003 HC RX 258: Performed by: HOSPITALIST

## 2024-07-08 PROCEDURE — C1747 HC ENDOSCOPE, SINGLE, URINARY TRACT: HCPCS | Performed by: UROLOGY

## 2024-07-08 PROCEDURE — 2709999900 HC NON-CHARGEABLE SUPPLY: Performed by: UROLOGY

## 2024-07-08 PROCEDURE — 3600000013 HC SURGERY LEVEL 3 ADDTL 15MIN: Performed by: UROLOGY

## 2024-07-08 PROCEDURE — C1769 GUIDE WIRE: HCPCS | Performed by: UROLOGY

## 2024-07-08 DEVICE — URETERAL STENT
Type: IMPLANTABLE DEVICE | Site: URETER | Status: FUNCTIONAL
Brand: PERCUFLEX™ PLUS

## 2024-07-08 RX ORDER — SODIUM CHLORIDE 9 MG/ML
INJECTION, SOLUTION INTRAVENOUS PRN
Status: DISCONTINUED | OUTPATIENT
Start: 2024-07-08 | End: 2024-07-08 | Stop reason: HOSPADM

## 2024-07-08 RX ORDER — SODIUM CHLORIDE 0.9 % (FLUSH) 0.9 %
5-40 SYRINGE (ML) INJECTION PRN
Status: DISCONTINUED | OUTPATIENT
Start: 2024-07-08 | End: 2024-07-08 | Stop reason: HOSPADM

## 2024-07-08 RX ORDER — LORAZEPAM 2 MG/ML
0.5 INJECTION INTRAMUSCULAR ONCE
Status: COMPLETED | OUTPATIENT
Start: 2024-07-08 | End: 2024-07-08

## 2024-07-08 RX ORDER — PROPOFOL 10 MG/ML
INJECTION, EMULSION INTRAVENOUS PRN
Status: DISCONTINUED | OUTPATIENT
Start: 2024-07-08 | End: 2024-07-08 | Stop reason: SDUPTHER

## 2024-07-08 RX ORDER — LIDOCAINE HYDROCHLORIDE 20 MG/ML
INJECTION, SOLUTION EPIDURAL; INFILTRATION; INTRACAUDAL; PERINEURAL PRN
Status: DISCONTINUED | OUTPATIENT
Start: 2024-07-08 | End: 2024-07-08 | Stop reason: SDUPTHER

## 2024-07-08 RX ORDER — GLYCOPYRROLATE 0.2 MG/ML
INJECTION INTRAMUSCULAR; INTRAVENOUS PRN
Status: DISCONTINUED | OUTPATIENT
Start: 2024-07-08 | End: 2024-07-08 | Stop reason: SDUPTHER

## 2024-07-08 RX ORDER — SODIUM CHLORIDE 0.9 % (FLUSH) 0.9 %
5-40 SYRINGE (ML) INJECTION EVERY 12 HOURS SCHEDULED
Status: DISCONTINUED | OUTPATIENT
Start: 2024-07-08 | End: 2024-07-08 | Stop reason: HOSPADM

## 2024-07-08 RX ORDER — SODIUM CHLORIDE, SODIUM LACTATE, POTASSIUM CHLORIDE, CALCIUM CHLORIDE 600; 310; 30; 20 MG/100ML; MG/100ML; MG/100ML; MG/100ML
INJECTION, SOLUTION INTRAVENOUS CONTINUOUS
Status: DISCONTINUED | OUTPATIENT
Start: 2024-07-08 | End: 2024-07-08 | Stop reason: HOSPADM

## 2024-07-08 RX ORDER — SODIUM CHLORIDE 9 MG/ML
INJECTION, SOLUTION INTRAVENOUS CONTINUOUS
Status: DISCONTINUED | OUTPATIENT
Start: 2024-07-08 | End: 2024-07-09 | Stop reason: HOSPADM

## 2024-07-08 RX ORDER — NALOXONE HYDROCHLORIDE 0.4 MG/ML
INJECTION, SOLUTION INTRAMUSCULAR; INTRAVENOUS; SUBCUTANEOUS PRN
Status: DISCONTINUED | OUTPATIENT
Start: 2024-07-08 | End: 2024-07-08 | Stop reason: HOSPADM

## 2024-07-08 RX ORDER — ONDANSETRON 2 MG/ML
4 INJECTION INTRAMUSCULAR; INTRAVENOUS
Status: DISCONTINUED | OUTPATIENT
Start: 2024-07-08 | End: 2024-07-08 | Stop reason: HOSPADM

## 2024-07-08 RX ORDER — FAMOTIDINE 20 MG/1
20 TABLET, FILM COATED ORAL ONCE
Status: COMPLETED | OUTPATIENT
Start: 2024-07-08 | End: 2024-07-08

## 2024-07-08 RX ORDER — FENTANYL CITRATE 50 UG/ML
INJECTION, SOLUTION INTRAMUSCULAR; INTRAVENOUS PRN
Status: DISCONTINUED | OUTPATIENT
Start: 2024-07-08 | End: 2024-07-08 | Stop reason: SDUPTHER

## 2024-07-08 RX ORDER — ONDANSETRON 2 MG/ML
INJECTION INTRAMUSCULAR; INTRAVENOUS PRN
Status: DISCONTINUED | OUTPATIENT
Start: 2024-07-08 | End: 2024-07-08 | Stop reason: SDUPTHER

## 2024-07-08 RX ORDER — FENTANYL CITRATE 50 UG/ML
50 INJECTION, SOLUTION INTRAMUSCULAR; INTRAVENOUS EVERY 5 MIN PRN
Status: DISCONTINUED | OUTPATIENT
Start: 2024-07-08 | End: 2024-07-08 | Stop reason: HOSPADM

## 2024-07-08 RX ORDER — PROCHLORPERAZINE EDISYLATE 5 MG/ML
10 INJECTION INTRAMUSCULAR; INTRAVENOUS
Status: DISCONTINUED | OUTPATIENT
Start: 2024-07-08 | End: 2024-07-08 | Stop reason: HOSPADM

## 2024-07-08 RX ORDER — PHENYLEPHRINE HCL IN 0.9% NACL 1 MG/10 ML
SYRINGE (ML) INTRAVENOUS PRN
Status: DISCONTINUED | OUTPATIENT
Start: 2024-07-08 | End: 2024-07-08 | Stop reason: SDUPTHER

## 2024-07-08 RX ADMIN — HYDROMORPHONE HYDROCHLORIDE 0.5 MG: 1 INJECTION, SOLUTION INTRAMUSCULAR; INTRAVENOUS; SUBCUTANEOUS at 19:50

## 2024-07-08 RX ADMIN — Medication 200 MCG: at 18:27

## 2024-07-08 RX ADMIN — HYDROMORPHONE HYDROCHLORIDE 0.5 MG: 1 INJECTION, SOLUTION INTRAMUSCULAR; INTRAVENOUS; SUBCUTANEOUS at 00:25

## 2024-07-08 RX ADMIN — GABAPENTIN 400 MG: 400 CAPSULE ORAL at 14:56

## 2024-07-08 RX ADMIN — GABAPENTIN 400 MG: 400 CAPSULE ORAL at 08:23

## 2024-07-08 RX ADMIN — CELECOXIB 100 MG: 100 CAPSULE ORAL at 22:40

## 2024-07-08 RX ADMIN — LORAZEPAM 0.5 MG: 2 INJECTION INTRAMUSCULAR; INTRAVENOUS at 20:25

## 2024-07-08 RX ADMIN — HYDROMORPHONE HYDROCHLORIDE 0.5 MG: 1 INJECTION, SOLUTION INTRAMUSCULAR; INTRAVENOUS; SUBCUTANEOUS at 19:22

## 2024-07-08 RX ADMIN — HYDROMORPHONE HYDROCHLORIDE 0.5 MG: 1 INJECTION, SOLUTION INTRAMUSCULAR; INTRAVENOUS; SUBCUTANEOUS at 16:39

## 2024-07-08 RX ADMIN — FENTANYL CITRATE 75 MCG: 50 INJECTION INTRAMUSCULAR; INTRAVENOUS at 18:18

## 2024-07-08 RX ADMIN — HYDROMORPHONE HYDROCHLORIDE 0.5 MG: 1 INJECTION, SOLUTION INTRAMUSCULAR; INTRAVENOUS; SUBCUTANEOUS at 05:47

## 2024-07-08 RX ADMIN — DIPHENHYDRAMINE HYDROCHLORIDE 12.5 MG: 50 INJECTION, SOLUTION INTRAMUSCULAR; INTRAVENOUS at 11:40

## 2024-07-08 RX ADMIN — HYDROMORPHONE HYDROCHLORIDE 0.5 MG: 1 INJECTION, SOLUTION INTRAMUSCULAR; INTRAVENOUS; SUBCUTANEOUS at 20:29

## 2024-07-08 RX ADMIN — TRAZODONE HYDROCHLORIDE 100 MG: 100 TABLET ORAL at 22:41

## 2024-07-08 RX ADMIN — SODIUM CHLORIDE, PRESERVATIVE FREE 10 ML: 5 INJECTION INTRAVENOUS at 08:24

## 2024-07-08 RX ADMIN — GLYCOPYRROLATE 0.2 MG: 0.2 INJECTION, SOLUTION INTRAMUSCULAR; INTRAVENOUS at 18:06

## 2024-07-08 RX ADMIN — QUETIAPINE FUMARATE 300 MG: 300 TABLET, EXTENDED RELEASE ORAL at 22:41

## 2024-07-08 RX ADMIN — DIPHENHYDRAMINE HYDROCHLORIDE 12.5 MG: 50 INJECTION, SOLUTION INTRAMUSCULAR; INTRAVENOUS at 19:17

## 2024-07-08 RX ADMIN — Medication 1 CAPSULE: at 08:23

## 2024-07-08 RX ADMIN — FAMOTIDINE 20 MG: 20 TABLET ORAL at 17:48

## 2024-07-08 RX ADMIN — BUPROPION HYDROCHLORIDE 300 MG: 300 TABLET, EXTENDED RELEASE ORAL at 08:23

## 2024-07-08 RX ADMIN — LEVETIRACETAM 1000 MG: 500 TABLET, FILM COATED ORAL at 22:40

## 2024-07-08 RX ADMIN — FENTANYL CITRATE 25 MCG: 50 INJECTION INTRAMUSCULAR; INTRAVENOUS at 18:06

## 2024-07-08 RX ADMIN — TAMSULOSIN HYDROCHLORIDE 0.4 MG: 0.4 CAPSULE ORAL at 08:23

## 2024-07-08 RX ADMIN — ZOLPIDEM TARTRATE 5 MG: 5 TABLET ORAL at 00:25

## 2024-07-08 RX ADMIN — ONDANSETRON 4 MG: 2 INJECTION INTRAMUSCULAR; INTRAVENOUS at 18:30

## 2024-07-08 RX ADMIN — DEXMEDETOMIDINE HYDROCHLORIDE 10 MCG: 100 INJECTION, SOLUTION INTRAVENOUS at 18:06

## 2024-07-08 RX ADMIN — SODIUM CHLORIDE, PRESERVATIVE FREE 10 ML: 5 INJECTION INTRAVENOUS at 22:41

## 2024-07-08 RX ADMIN — ENOXAPARIN SODIUM 30 MG: 100 INJECTION SUBCUTANEOUS at 22:40

## 2024-07-08 RX ADMIN — ATORVASTATIN CALCIUM 40 MG: 40 TABLET, FILM COATED ORAL at 22:40

## 2024-07-08 RX ADMIN — CELECOXIB 100 MG: 100 CAPSULE ORAL at 08:24

## 2024-07-08 RX ADMIN — DIPHENHYDRAMINE HYDROCHLORIDE 12.5 MG: 50 INJECTION, SOLUTION INTRAMUSCULAR; INTRAVENOUS at 05:48

## 2024-07-08 RX ADMIN — LIDOCAINE HYDROCHLORIDE 100 MG: 20 INJECTION, SOLUTION EPIDURAL; INFILTRATION; INTRACAUDAL; PERINEURAL at 18:18

## 2024-07-08 RX ADMIN — Medication 1 CAPSULE: at 22:41

## 2024-07-08 RX ADMIN — HYDROMORPHONE HYDROCHLORIDE 0.5 MG: 1 INJECTION, SOLUTION INTRAMUSCULAR; INTRAVENOUS; SUBCUTANEOUS at 23:31

## 2024-07-08 RX ADMIN — HYDROMORPHONE HYDROCHLORIDE 0.5 MG: 1 INJECTION, SOLUTION INTRAMUSCULAR; INTRAVENOUS; SUBCUTANEOUS at 19:10

## 2024-07-08 RX ADMIN — DIPHENHYDRAMINE HYDROCHLORIDE 12.5 MG: 50 INJECTION, SOLUTION INTRAMUSCULAR; INTRAVENOUS at 00:25

## 2024-07-08 RX ADMIN — LEVETIRACETAM 1000 MG: 500 TABLET, FILM COATED ORAL at 08:23

## 2024-07-08 RX ADMIN — PROPOFOL 200 MG: 10 INJECTION, EMULSION INTRAVENOUS at 18:18

## 2024-07-08 RX ADMIN — SODIUM CHLORIDE: 9 INJECTION, SOLUTION INTRAVENOUS at 11:01

## 2024-07-08 RX ADMIN — DEXMEDETOMIDINE HYDROCHLORIDE 6 MCG: 100 INJECTION, SOLUTION INTRAVENOUS at 18:42

## 2024-07-08 RX ADMIN — DIPHENHYDRAMINE HYDROCHLORIDE 12.5 MG: 50 INJECTION, SOLUTION INTRAMUSCULAR; INTRAVENOUS at 23:30

## 2024-07-08 RX ADMIN — GABAPENTIN 400 MG: 400 CAPSULE ORAL at 22:41

## 2024-07-08 RX ADMIN — SERTRALINE 100 MG: 50 TABLET, FILM COATED ORAL at 08:23

## 2024-07-08 RX ADMIN — SODIUM CHLORIDE: 9 INJECTION, SOLUTION INTRAVENOUS at 22:40

## 2024-07-08 RX ADMIN — WATER 1000 MG: 1 INJECTION INTRAMUSCULAR; INTRAVENOUS; SUBCUTANEOUS at 22:40

## 2024-07-08 RX ADMIN — HYDROMORPHONE HYDROCHLORIDE 0.5 MG: 1 INJECTION, SOLUTION INTRAMUSCULAR; INTRAVENOUS; SUBCUTANEOUS at 11:12

## 2024-07-08 ASSESSMENT — PAIN SCALES - GENERAL
PAINLEVEL_OUTOF10: 8
PAINLEVEL_OUTOF10: 8
PAINLEVEL_OUTOF10: 10
PAINLEVEL_OUTOF10: 9
PAINLEVEL_OUTOF10: 7
PAINLEVEL_OUTOF10: 8
PAINLEVEL_OUTOF10: 6
PAINLEVEL_OUTOF10: 6
PAINLEVEL_OUTOF10: 9
PAINLEVEL_OUTOF10: 10
PAINLEVEL_OUTOF10: 10
PAINLEVEL_OUTOF10: 4
PAINLEVEL_OUTOF10: 8
PAINLEVEL_OUTOF10: 3
PAINLEVEL_OUTOF10: 6
PAINLEVEL_OUTOF10: 8
PAINLEVEL_OUTOF10: 7

## 2024-07-08 ASSESSMENT — PAIN - FUNCTIONAL ASSESSMENT
PAIN_FUNCTIONAL_ASSESSMENT: ACTIVITIES ARE NOT PREVENTED

## 2024-07-08 ASSESSMENT — PAIN DESCRIPTION - ORIENTATION
ORIENTATION: LOWER;LEFT
ORIENTATION: LOWER;LEFT
ORIENTATION: MID
ORIENTATION: MID
ORIENTATION: LEFT
ORIENTATION: LOWER;LEFT
ORIENTATION: LEFT;LOWER
ORIENTATION: LOWER;LEFT
ORIENTATION: LOWER
ORIENTATION: LOWER;LEFT
ORIENTATION: MID
ORIENTATION: LEFT
ORIENTATION: LEFT;LOWER
ORIENTATION: LOWER;LEFT
ORIENTATION: MID
ORIENTATION: LEFT;LOWER
ORIENTATION: LOWER

## 2024-07-08 ASSESSMENT — PAIN DESCRIPTION - DESCRIPTORS
DESCRIPTORS: SHARP;STABBING
DESCRIPTORS: ACHING
DESCRIPTORS: SHARP;STABBING
DESCRIPTORS: ACHING
DESCRIPTORS: ACHING
DESCRIPTORS: STABBING
DESCRIPTORS: ACHING
DESCRIPTORS: STABBING;SHARP
DESCRIPTORS: SHARP;STABBING
DESCRIPTORS: ACHING
DESCRIPTORS: SHARP;STABBING
DESCRIPTORS: ACHING
DESCRIPTORS: ACHING
DESCRIPTORS: SHARP;STABBING
DESCRIPTORS: STABBING
DESCRIPTORS: ACHING
DESCRIPTORS: ACHING

## 2024-07-08 ASSESSMENT — PAIN DESCRIPTION - FREQUENCY
FREQUENCY: INTERMITTENT
FREQUENCY: CONTINUOUS
FREQUENCY: INTERMITTENT
FREQUENCY: CONTINUOUS

## 2024-07-08 ASSESSMENT — PAIN DESCRIPTION - DIRECTION
RADIATING_TOWARDS: NOT RADIATING
RADIATING_TOWARDS: NO
RADIATING_TOWARDS: NOT RADIATING

## 2024-07-08 ASSESSMENT — PAIN DESCRIPTION - ONSET
ONSET: GRADUAL
ONSET: ON-GOING
ONSET: GRADUAL
ONSET: ON-GOING

## 2024-07-08 ASSESSMENT — PAIN DESCRIPTION - LOCATION
LOCATION: ABDOMEN
LOCATION: PELVIS
LOCATION: ABDOMEN
LOCATION: PELVIS

## 2024-07-08 ASSESSMENT — PAIN DESCRIPTION - PAIN TYPE
TYPE: ACUTE PAIN
TYPE: ACUTE PAIN
TYPE: SURGICAL PAIN
TYPE: SURGICAL PAIN
TYPE: ACUTE PAIN
TYPE: SURGICAL PAIN
TYPE: ACUTE PAIN
TYPE: ACUTE PAIN

## 2024-07-08 NOTE — ANESTHESIA PRE PROCEDURE
07/08/2024 05:39 AM     07/08/2024 05:39 AM    CO2 29 07/08/2024 05:39 AM    BUN 9 07/08/2024 05:39 AM    CREATININE 0.69 07/08/2024 05:39 AM    LABGLOM >90 07/08/2024 05:39 AM    LABGLOM >60 12/16/2023 10:03 PM    GLUCOSE 92 07/08/2024 05:39 AM    CALCIUM 8.8 07/08/2024 05:39 AM    BILITOT 0.2 07/03/2024 07:12 PM    ALKPHOS 94 07/03/2024 07:12 PM    AST 43 07/03/2024 07:12 PM    ALT 51 07/03/2024 07:12 PM       POC Tests: No results for input(s): \"POCGLU\", \"POCNA\", \"POCK\", \"POCCL\", \"POCBUN\", \"POCHEMO\", \"POCHCT\" in the last 72 hours.    Coags:   Lab Results   Component Value Date/Time    PROTIME 14.4 05/25/2024 05:44 AM    INR 1.1 05/25/2024 05:44 AM       HCG (If Applicable):   Lab Results   Component Value Date    PREGTESTUR Negative 06/01/2024        ABGs: No results found for: \"PHART\", \"PO2ART\", \"GCA4RYH\", \"WQC6ZML\", \"BEART\", \"K9PEDWPZ\"     Type & Screen (If Applicable):  No results found for: \"LABABO\"    Drug/Infectious Status (If Applicable):  No results found for: \"HIV\", \"HEPCAB\"    COVID-19 Screening (If Applicable):   Lab Results   Component Value Date/Time    COVID19 Not detected 05/31/2024 11:37 PM           Anesthesia Evaluation  Patient summary reviewed  Airway: Mallampati: III          Dental: normal exam   (+) caps      Pulmonary:Negative Pulmonary ROS and normal exam    (+)           asthma:                            Cardiovascular:Negative CV ROS  Exercise tolerance: good (>4 METS)  (+) hyperlipidemia      ECG reviewed  Rhythm: regular  Rate: normal  Echocardiogram reviewed                  Neuro/Psych:   Negative Neuro/Psych ROS  (+) neuromuscular disease:, headaches:, psychiatric history:            GI/Hepatic/Renal:   (+) renal disease: kidney stones, morbid obesity          Endo/Other: Negative Endo/Other ROS                    Abdominal:             Vascular: negative vascular ROS.         Other Findings:       Anesthesia Plan      general     ASA 3       Induction:

## 2024-07-08 NOTE — PLAN OF CARE
Problem: Pain  Goal: Verbalizes/displays adequate comfort level or baseline comfort level  Outcome: Progressing     Problem: Safety - Adult  Goal: Free from fall injury  Outcome: Progressing  Flowsheets (Taken 7/7/2024 2022)  Free From Fall Injury: Instruct family/caregiver on patient safety     Problem: Nutrition Deficit:  Goal: Optimize nutritional status  Outcome: Progressing

## 2024-07-08 NOTE — PLAN OF CARE
Problem: Pain  Goal: Verbalizes/displays adequate comfort level or baseline comfort level  7/8/2024 0832 by Elana Bear RN  Outcome: Progressing  7/8/2024 0552 by Emilia Garcia RN  Outcome: Progressing     Problem: Safety - Adult  Goal: Free from fall injury  7/8/2024 0832 by Elana Bear RN  Outcome: Progressing  7/8/2024 0552 by Emilia Garcia RN  Outcome: Progressing  Flowsheets (Taken 7/7/2024 2022)  Free From Fall Injury: Instruct family/caregiver on patient safety     Problem: Nutrition Deficit:  Goal: Optimize nutritional status  7/8/2024 0832 by Elana Bear RN  Outcome: Progressing  7/8/2024 0552 by Emilia Garcia RN  Outcome: Progressing

## 2024-07-08 NOTE — FLOWSHEET NOTE
07/08/24 1112   Pain Assessment   Pain Assessment 0-10   Pain Level 8   Patient's Stated Pain Goal 0 - No pain   Pain Location Abdomen   Pain Orientation Mid   Pain Descriptors Aching   Functional Pain Assessment Activities are not prevented   Pain Type Acute pain   Pain Radiating Towards n/a   Pain Frequency Intermittent   Pain Onset Gradual     Pt wanted benadryl with the Dilaudid. Educated on the risk of giving them together. Pt rejected teaching. Medicated with Dilaudid an will administered Benadryl later if needed for itching. No itching at this time.

## 2024-07-08 NOTE — OP NOTE
Operative Note  Patient: Garrison Buenrostro               Sex: female             MRN: [id number]  YOB: 1970      Age:  53 y.o.              Preoperative Diagnosis: Left ureteral stone [N20.1]  Postoperative Diagnosis:  * No post-op diagnosis entered *  Surgeon: Wood Julian     Indication: This is a 54 y/o woman stented for ureteral stone several months ago continues to fail to follow up in the office admitted for stent pain over the weekend proceeding to URS today with no growth culture.      Procedure:    1) Cystoscopy  2) Retrograde pyelogram with interpretation  3) < 1 hour physician fluoroscopy imaging interpretation time  4) Left side Ureteroscopy, laser lithotripsy and stone extraction   5) JJ ureteral stent placement     Findings:    1). Normal cystoscopy   2). Retrograde pyelograms without evidence of hydronephrosis or filling defects  3). Total stone burden 7mm in mid pole, dusted   4). 6x26 JJ stent placed     Narrative of events: The patient was brought to the operative suite. Anesthesia was induced and preoperative antibiotics were administered. They were then placed in the dorsal lithotomy position and their external genitalia was prepped and draped in the usual fashion. A surgical timeout was performed confirming the patient's name, date of birth, laterality, and antibiotics. All were in agreement. A 22 Maltese cystourethroscope was then inserted into the patients bladder. The urethra revealed no abnormalities.   Thorough cystoscopy was performed with both the 30 degree and 70 degree lens.     The left ureteral orifice was cannulated with a 0.035 sensor tip wire and confirmed in the renal pelvis.  Old stent was removed and partially encrusted.  Semirigid ureteroscopy revealed no ureteral stones.  A duel lumen was advanced over this and retrograde pyelogram was performed no evidence of hydronephrosis or filling defects.  Second wire was advanced and duel lumen was removed.  11/13 Ureteral access

## 2024-07-09 VITALS
TEMPERATURE: 99.4 F | DIASTOLIC BLOOD PRESSURE: 74 MMHG | OXYGEN SATURATION: 96 % | HEIGHT: 64 IN | HEART RATE: 102 BPM | WEIGHT: 250.9 LBS | RESPIRATION RATE: 18 BRPM | BODY MASS INDEX: 42.84 KG/M2 | SYSTOLIC BLOOD PRESSURE: 118 MMHG

## 2024-07-09 PROBLEM — N39.0 COMPLICATED UTI (URINARY TRACT INFECTION): Status: RESOLVED | Noted: 2024-06-01 | Resolved: 2024-07-09

## 2024-07-09 PROBLEM — R10.9 ABDOMINAL PAIN: Status: RESOLVED | Noted: 2024-07-03 | Resolved: 2024-07-09

## 2024-07-09 PROCEDURE — 2580000003 HC RX 258: Performed by: INTERNAL MEDICINE

## 2024-07-09 PROCEDURE — 2700000000 HC OXYGEN THERAPY PER DAY

## 2024-07-09 PROCEDURE — 6360000002 HC RX W HCPCS: Performed by: INTERNAL MEDICINE

## 2024-07-09 PROCEDURE — 6370000000 HC RX 637 (ALT 250 FOR IP): Performed by: HOSPITALIST

## 2024-07-09 PROCEDURE — 2580000003 HC RX 258: Performed by: HOSPITALIST

## 2024-07-09 PROCEDURE — 6360000002 HC RX W HCPCS: Performed by: HOSPITALIST

## 2024-07-09 PROCEDURE — 94761 N-INVAS EAR/PLS OXIMETRY MLT: CPT

## 2024-07-09 PROCEDURE — 6370000000 HC RX 637 (ALT 250 FOR IP): Performed by: INTERNAL MEDICINE

## 2024-07-09 PROCEDURE — 99239 HOSP IP/OBS DSCHRG MGMT >30: CPT | Performed by: HOSPITALIST

## 2024-07-09 RX ORDER — ONDANSETRON 2 MG/ML
4 INJECTION INTRAMUSCULAR; INTRAVENOUS EVERY 4 HOURS PRN
Status: DISCONTINUED | OUTPATIENT
Start: 2024-07-09 | End: 2024-07-09 | Stop reason: HOSPADM

## 2024-07-09 RX ORDER — LEVETIRACETAM 1000 MG/1
1000 TABLET ORAL 2 TIMES DAILY
Qty: 60 TABLET | Refills: 0 | Status: SHIPPED | OUTPATIENT
Start: 2024-07-09

## 2024-07-09 RX ORDER — ONDANSETRON 4 MG/1
4 TABLET, ORALLY DISINTEGRATING ORAL EVERY 8 HOURS PRN
Status: DISCONTINUED | OUTPATIENT
Start: 2024-07-09 | End: 2024-07-09 | Stop reason: HOSPADM

## 2024-07-09 RX ORDER — LEVETIRACETAM 1000 MG/1
1000 TABLET ORAL 2 TIMES DAILY
Qty: 60 TABLET | Refills: 0 | Status: SHIPPED | OUTPATIENT
Start: 2024-07-09 | End: 2024-07-09

## 2024-07-09 RX ADMIN — HYDROMORPHONE HYDROCHLORIDE 0.5 MG: 1 INJECTION, SOLUTION INTRAMUSCULAR; INTRAVENOUS; SUBCUTANEOUS at 08:06

## 2024-07-09 RX ADMIN — LEVETIRACETAM 1000 MG: 500 TABLET, FILM COATED ORAL at 08:52

## 2024-07-09 RX ADMIN — SERTRALINE 50 MG: 50 TABLET, FILM COATED ORAL at 08:52

## 2024-07-09 RX ADMIN — ENOXAPARIN SODIUM 30 MG: 100 INJECTION SUBCUTANEOUS at 08:13

## 2024-07-09 RX ADMIN — ONDANSETRON 4 MG: 2 INJECTION INTRAMUSCULAR; INTRAVENOUS at 08:05

## 2024-07-09 RX ADMIN — TAMSULOSIN HYDROCHLORIDE 0.4 MG: 0.4 CAPSULE ORAL at 08:53

## 2024-07-09 RX ADMIN — ALPRAZOLAM 1 MG: 0.5 TABLET ORAL at 15:03

## 2024-07-09 RX ADMIN — ACETAMINOPHEN 325MG 650 MG: 325 TABLET ORAL at 04:45

## 2024-07-09 RX ADMIN — GABAPENTIN 400 MG: 400 CAPSULE ORAL at 15:03

## 2024-07-09 RX ADMIN — PANTOPRAZOLE SODIUM 40 MG: 40 INJECTION, POWDER, FOR SOLUTION INTRAVENOUS at 12:13

## 2024-07-09 RX ADMIN — ONDANSETRON 4 MG: 4 TABLET, ORALLY DISINTEGRATING ORAL at 16:19

## 2024-07-09 RX ADMIN — BUPROPION HYDROCHLORIDE 300 MG: 300 TABLET, EXTENDED RELEASE ORAL at 08:52

## 2024-07-09 RX ADMIN — ONDANSETRON 4 MG: 2 INJECTION INTRAMUSCULAR; INTRAVENOUS at 12:09

## 2024-07-09 RX ADMIN — HYDROCODONE BITARTRATE AND ACETAMINOPHEN 1 TABLET: 7.5; 325 TABLET ORAL at 12:09

## 2024-07-09 RX ADMIN — GABAPENTIN 400 MG: 400 CAPSULE ORAL at 08:53

## 2024-07-09 RX ADMIN — PANTOPRAZOLE SODIUM 40 MG: 40 TABLET, DELAYED RELEASE ORAL at 04:46

## 2024-07-09 RX ADMIN — DIPHENHYDRAMINE HYDROCHLORIDE 12.5 MG: 50 INJECTION, SOLUTION INTRAMUSCULAR; INTRAVENOUS at 04:46

## 2024-07-09 RX ADMIN — SODIUM CHLORIDE, PRESERVATIVE FREE 10 ML: 5 INJECTION INTRAVENOUS at 08:59

## 2024-07-09 RX ADMIN — CELECOXIB 100 MG: 100 CAPSULE ORAL at 08:54

## 2024-07-09 RX ADMIN — SODIUM CHLORIDE: 9 INJECTION, SOLUTION INTRAVENOUS at 12:18

## 2024-07-09 RX ADMIN — PANTOPRAZOLE SODIUM 40 MG: 40 TABLET, DELAYED RELEASE ORAL at 08:52

## 2024-07-09 RX ADMIN — HYDROMORPHONE HYDROCHLORIDE 0.5 MG: 1 INJECTION, SOLUTION INTRAMUSCULAR; INTRAVENOUS; SUBCUTANEOUS at 04:47

## 2024-07-09 RX ADMIN — Medication 1 CAPSULE: at 08:52

## 2024-07-09 RX ADMIN — DIPHENHYDRAMINE HYDROCHLORIDE 12.5 MG: 50 INJECTION, SOLUTION INTRAMUSCULAR; INTRAVENOUS at 08:49

## 2024-07-09 ASSESSMENT — PAIN - FUNCTIONAL ASSESSMENT
PAIN_FUNCTIONAL_ASSESSMENT: ACTIVITIES ARE NOT PREVENTED

## 2024-07-09 ASSESSMENT — PAIN SCALES - GENERAL
PAINLEVEL_OUTOF10: 8
PAINLEVEL_OUTOF10: 0
PAINLEVEL_OUTOF10: 9
PAINLEVEL_OUTOF10: 4
PAINLEVEL_OUTOF10: 0
PAINLEVEL_OUTOF10: 10
PAINLEVEL_OUTOF10: 0
PAINLEVEL_OUTOF10: 0

## 2024-07-09 ASSESSMENT — PAIN DESCRIPTION - DESCRIPTORS
DESCRIPTORS: SHARP;STABBING
DESCRIPTORS: STABBING;SHARP
DESCRIPTORS: STABBING;SHARP
DESCRIPTORS: ACHING
DESCRIPTORS: STABBING;SHARP

## 2024-07-09 ASSESSMENT — PAIN DESCRIPTION - PAIN TYPE
TYPE: ACUTE PAIN

## 2024-07-09 ASSESSMENT — PAIN DESCRIPTION - LOCATION
LOCATION: ABDOMEN;BACK;FLANK
LOCATION: HEAD;PELVIS
LOCATION: ABDOMEN;HEAD;BACK
LOCATION: ABDOMEN;BACK;FLANK
LOCATION: ABDOMEN;HEAD;BACK

## 2024-07-09 ASSESSMENT — PAIN DESCRIPTION - FREQUENCY
FREQUENCY: CONTINUOUS

## 2024-07-09 ASSESSMENT — PAIN DESCRIPTION - ORIENTATION
ORIENTATION: LEFT;ANTERIOR

## 2024-07-09 ASSESSMENT — PAIN DESCRIPTION - ONSET
ONSET: ON-GOING

## 2024-07-09 NOTE — PLAN OF CARE
Problem: Pain  Goal: Verbalizes/displays adequate comfort level or baseline comfort level  7/9/2024 1231 by Judy Roque, RN  Outcome: Progressing  Flowsheets (Taken 7/9/2024 0806)  Verbalizes/displays adequate comfort level or baseline comfort level: Encourage patient to monitor pain and request assistance  7/9/2024 0119 by Kimber Merino, RN  Outcome: Progressing     Problem: Safety - Adult  Goal: Free from fall injury  7/9/2024 1231 by Judy Roque, RN  Outcome: Progressing  7/9/2024 0119 by Kimber Merino, RN  Outcome: Progressing     Problem: Nutrition Deficit:  Goal: Optimize nutritional status  Outcome: Progressing

## 2024-07-09 NOTE — PROGRESS NOTES
Progress Note      Patient: Garrison Buenrostro MRN: [id number]  SSN: [id number]    YOB: 1970  Age: 53 y.o.  Sex: female      Admit Date: 7/3/2024    LOS: 2 days     Assessment:   -Nephrolithiasis.   Surgical History:  S/p cysto; left RPG; left double j ureteral stent placement on 05/25/24 by Dr. Ochoa.    S/p cysto, left retrograde pyelogram, left double-J ureteral stent exchange (7 f x 26cm) on 06/01/24 with   Dr. Agrawal    CT A/P 05/24/24: There is a 5 x 3 mm distal left ureteral stone. Presently, not causing obstruction.  Additional nonobstructing left kidney stones.  Previous right nephrectomy.    CT AP 7/3/24: Stable nonobstructing 8 mm left renal stone. Left ureteral stent in place, without  hydronephrosis.    -Right Nephrectomy 2/2 kidney stones in 2012    -seizures  -sciatica  Plan:     -WBC: 4.1 Hgb: 9.9  -sCr: 0.78-stable  -Reviewed CT AP 7/3/24  stent in appropriate position  -Per chart review pt has been difficult to reach, to further coordinate medical clearance, office appts etc for OP stone treatment. Multiple ER visits noted  -Pt adamant to have stent removed-pt with solitary kidney. Hx of Right Nephrectomy 2/2 kidney stones in 2012. Recommend IP stone tx given issues with compliance.   -Ucx 7/3/24 shows no growth. Receiving IV Rocephin 1g Q 24 hrs per ID  -NPO after midnight on Sunday 7/7/24 for OR on Monday 7/8/24 for Intermediate Left URS w LL, JJ stent exchange.   -Urology following    Follow up: pending clinical course     Signed By: Marisol Pinzon, GABRIEL - CNP     July 5, 2024      PAGER:  [phone]  OFFICE:  [phone]  ANSWERING SERVICE   [phone]    Subjective:     VSS. Afebrile. Patient remains stable. Reports intermittent left flank pain. Denies dysuria. Reports intermittent gross hematuria. Nursing staff informed me urine has been clear and yellow thus far. Patient denies fevers/chills, nausea, or vomiting. Denies smoking history. Denies cardiac history. Not 
#1 inpatient add on s/p URS 7/8    Solitary kidney so no string on stent.  Must follow up with us in office in 7-14 days for cysto/stent removal. Message sent.      Can d/c once able   
0130- Telephone Transfer report given to VILMA Garnica (oncoming nurse) by Kamryn Cardoso RN (offgoing nurse). Report included the following information Nurse Handoff Report, Adult Overview, MAR, Quality Measures, and Neuro Assessment.    0144- Patient admitted to unit.Patient refuses to bathe at this time and wants to keep on clothes until the am. Patient is ambulatory and states that she does not want any male nurses. Charge notified of patients preference of no male nurses.   0202-Spoke to Dr. Ortiz regarding clarification of orders, wanted to know if patient should be NPO versus regular diet in anticipation of stent exchange tomorrow, wanted to know if Keppra and Rocephin could be held. Both medications were given in the ED, Keppra at 2314 and Rocephin at 2120. Given NPO order, and orders for Keppra and Rocephin to be started in the morning.  0300- Patient complains of pain. Norco given.   0308- Patient given Xanax as requested for anxiety.  
Infectious Disease progress Note        Reason:complicated UTI    Current abx Prior abx   Ceftriaxone since 7/3 Cefpodoxime 6/20-6/27  Trimeth/sulfa 6/27-7/2     Lines:       Assessment :  53 y.o. female with history of recent left ureteral stent,  polypharmacy, right nephrectomy secondary to kidney stones, severe depression, anxiety on multiple psych meds presented to [facility] on 7/3/24 with left flank pain, nausea, vomiting.      admitted to [facility] 5/24-5/27/24 with ureteric colic.   Stent was placed.  UA and urine culture were collected. Negative urine culture 5/24/24     Status post left ureteral stent exchange on 6/1/2024.  No purulent urine noted intraoperatively.  Urine culture 6/1/2024-no growth      Urine cx 6/20/24-greater than 100,000 colonies of E. coli, Klebsiella-status post p.o. cefuroxime, trimethoprim/sulfa    Now with persistent left flank pain, nausea, vomiting      Clinical presentation consistent with complicated urinary tract infection; need for urological intervention    Urine cx 7/4- no growth  Urine cx 7/6- no significant growth     Urology follow-up appreciated.S/P cysto/left retrograde pyelogram, left URS w LL stone extraction, JJ stent exchange on 7/8/24     IntraOp findings noted.  No evidence of purulent urine/infection noted     Persistent left flank pain : Likely due to stent irritation      Clinically stable from infectious disease standpoint        Recommendations:     Discontinue ceftriaxone  Follow-up urology recommendations regarding stent removal  Pain management, management of nausea per primary team  4.  Discharge planning per primary team    Will sign off.  Follow-up as needed thanks       Above plan was discussed in details with patient,  and dr Suarez. Please call me if any further questions or concerns.   HPI:    Patient denies any fever, chills.  Denies any dysuria.  Complains of pain left upper abdomen/flank.  No nausea, vomiting.      Past Medical History:   Diagnosis Date    
Pt being discharged today, discharge teaching done at bedside, MidLine PIV removed, VS stable, all questions answered for patient, took pt out via wheelchair.  
Pt c/o of bad piv, stating its painful, line accessed with brisk blood floor, pt did witnessed line flushed with great blood flow, she request line to remain as it is, she stated 'its ok now'. Will continue to monitor.  
Pt complained of general itching. Medicated with PRN Benadryl. Pt remains alert.   
Pt is upset an crying that she did not g=et her benadryl with her dilaudid. Educated an informed her that I ca not give her another sedative mediation within the hour of another one.     Pt is crying an PACU is at the bedside with her.     She stated she was having a reaction. I stated what is her reaction after assessing her visibly. She stated her neida area an eyes are burning. I stated to the pt we can call the MD an let him know that she can not take it. Patient stated no because I can not take anything else.     Informed the pt each time I come is in to check her she's sleeping. Pt stated I should have given her the benadryl a hour ago. Informed pt that I was with another patient an she disregarded.    
Sebastian Quinonez [facility] Hospitalist Group  Progress Note    Patient: Garrison Buenrostro Age: 53 y.o. : 1970 MR#: [id number] SSN: [id number]  Date/Time: 2024     Subjective: Patient lying in the bed, feels okay but continues to have flank pain.  Requesting for increasing pain medications.      Assessment/Plan:   UTI, associated with left ureteral stent  Nephrolithiasis  History of right nephrectomy, for kidney stones  PTSD, depression and anxiety  History of seizures  Obesity    Plan:  Continue empiric antibiotics with ceftriaxone, ID input noted  Urology input noted, plan for definitive stone treatment and JJ stent exchange on Monday if cultures remain negative  Continue Dilaudid with IV Benadryl  Continue Xanax and also gabapentin  Continue Keppra  Further plan based on hospital course    Discussed with patient at the bedside and explained about my above plan care.  Advised patient that she is already on high-dose of IV Dilaudid and p.o. pain medication.  Patient agreed with continuing current dose of medications.      Dispo plan: Home with home health care once cleared by urology, anticipated discharge date 2024      Case discussed with:  [x]Patient  [x]Family  []Nursing  []Case Management  DVT Prophylaxis:  []Lovenox  []Hep SQ  [x]SCDs  []Coumadin   []Eliquis/Xarelto     Objective:   VS: BP (!) 142/90   Pulse 84   Temp 97.6 °F (36.4 °C) (Oral)   Resp 18   Ht 1.626 m (5' 4\")   Wt 113.8 kg (250 lb 14.4 oz)   SpO2 98%   BMI 43.07 kg/m²    Tmax/24hrs: Temp (24hrs), Av.1 °F (36.7 °C), Min:97.6 °F (36.4 °C), Max:98.4 °F (36.9 °C)  IOBRIEF  Intake/Output Summary (Last 24 hours) at 2024 1413  Last data filed at 2024 0735  Gross per 24 hour   Intake 513 ml   Output --   Net 513 ml       General:  Alert, cooperative, no acute distress   Pulmonary:  CTA Bilaterally. No Wheezing/Rales.  Cardiovascular: Regular rate and Rhythm.  GI:  Soft, Non distended, Non tender. + Bowel 
Sebastian Quinonez [facility] Hospitalist Group  Progress Note    Patient: Garrison Buenrostro Age: 53 y.o. : 1970 MR#: [id number] SSN: [id number]  Date/Time: 2024     Subjective: Patient lying in the bed, feels okay but continues to have flank pain.      Assessment/Plan:   UTI, associated with left ureteral stent  Nephrolithiasis  History of right nephrectomy, for kidney stones  PTSD, depression and anxiety  History of seizures  Obesity    Plan:  Continue empiric antibiotics with ceftriaxone, ID input noted  Urology input noted, plan for definitive stone treatment and JJ stent exchange on Monday if cultures remain negative  Continue Dilaudid with IV Benadryl  Continue Xanax and also gabapentin  Continue Keppra  Further plan based on hospital course    Discussed with patient at the bedside and explained about my above plan care.  Patient understood and agreed with the plan.      Dispo plan: Home with home health care once cleared by urology, anticipated discharge date 2024      Case discussed with:  [x]Patient  [x]Family  []Nursing  []Case Management  DVT Prophylaxis:  []Lovenox  []Hep SQ  [x]SCDs  []Coumadin   []Eliquis/Xarelto     Objective:   VS: /86   Pulse 86   Temp 97.9 °F (36.6 °C) (Oral)   Resp 16   Ht 1.626 m (5' 4\")   Wt 113.8 kg (250 lb 14.4 oz)   SpO2 95%   BMI 43.07 kg/m²    Tmax/24hrs: Temp (24hrs), Av °F (36.7 °C), Min:97.7 °F (36.5 °C), Max:98.4 °F (36.9 °C)  IOBRIEF  Intake/Output Summary (Last 24 hours) at 2024 1405  Last data filed at 2024 1345  Gross per 24 hour   Intake 2706.44 ml   Output 200 ml   Net 2506.44 ml       General:  Alert, cooperative, no acute distress   Pulmonary:  CTA Bilaterally. No Wheezing/Rales.  Cardiovascular: Regular rate and Rhythm.  GI:  Soft, Non distended, Non tender. + Bowel sounds.  Extremities:  No edema. No calf tenderness.   Psych: Good insight. Not anxious or agitated.  Neurologic: Alert and oriented X 3. Moves all 
Sebastian Quinonez [facility] Hospitalist Group  Progress Note    Patient: Garrison Buenrostro Age: 53 y.o. : 1970 MR#: [id number] SSN: [id number]  Date/Time: 2024     Subjective: Patient ambulating in the room, continues to complain of left flank pain but well-controlled with pain medications.      Assessment/Plan:   UTI, associated with left ureteral stent  Nephrolithiasis  History of right nephrectomy, for kidney stones  PTSD, depression and anxiety  History of seizures  Obesity    Plan:  Continue empiric antibiotics with ceftriaxone, ID input noted  Urology input noted, plan for definitive stone treatment and JJ stent exchange on Monday if cultures remain negative  Continue Dilaudid with IV Benadryl  Will resume patient's home medication Xanax and also gabapentin  Continue Keppra  Further plan based on hospital course    Discussed with patient at the bedside and explained about my above plan care.  Patient understood and agreed with the plan.      Dispo plan: Home with home health care once cleared by urology, anticipated discharge date 2024      Case discussed with:  [x]Patient  [x]Family  []Nursing  []Case Management  DVT Prophylaxis:  []Lovenox  []Hep SQ  [x]SCDs  []Coumadin   []Eliquis/Xarelto     Objective:   VS: /87   Pulse 99   Temp 97.9 °F (36.6 °C) (Oral)   Resp 16   Ht 1.626 m (5' 4\")   Wt 113.8 kg (250 lb 14.4 oz)   SpO2 95%   BMI 43.07 kg/m²    Tmax/24hrs: Temp (24hrs), Av °F (36.7 °C), Min:97.6 °F (36.4 °C), Max:98.4 °F (36.9 °C)  IOBRIEF  Intake/Output Summary (Last 24 hours) at 2024 1634  Last data filed at 2024 1222  Gross per 24 hour   Intake 1100 ml   Output --   Net 1100 ml       General:  Alert, cooperative, no acute distress   HEENT: PERRLA, anicteric sclerae.  Pulmonary:  CTA Bilaterally. No Wheezing/Rales.  Cardiovascular: Regular rate and Rhythm.  GI:  Soft, Non distended, Non tender. + Bowel sounds.  Extremities:  No edema. No calf tenderness. 
Urology Progress Note        Assessment/Plan:     Patient Active Problem List   Diagnosis    Lumbar radiculopathy    Migraine headache    Seizures (HCC)    Left renal stone    Pulmonary nodule seen on imaging study    Ureteral colic    Generalized abdominal pain    Anxiety    Moderate episode of recurrent major depressive disorder (HCC)    Respiratory failure (HCC)    Acute hypoxic on chronic hypercapnic respiratory failure (HCC)    Polypharmacy    Left upper quadrant abdominal pain    Ureteral stent present    Acute respiratory failure with hypoxia and hypercapnia (HCC)    ARF (acute renal failure) (HCC)    Single kidney    Complicated UTI (urinary tract infection)    Flank pain    Hypoxia    Sepsis without acute organ dysfunction (HCC)    Pyelonephritis    Abdominal pain       -Nephrolithiasis.   Surgical History:  S/p cysto; left RPG; left double j ureteral stent placement on 05/25/24 by Dr. Ochoa.     S/p cysto, left retrograde pyelogram, left double-J ureteral stent exchange (7 f x 26cm) on 06/01/24 with   Dr. Agrawal     CT A/P 05/24/24: There is a 5 x 3 mm distal left ureteral stone. Presently, not causing obstruction.  Additional nonobstructing left kidney stones.  Previous right nephrectomy.     CT AP 7/3/24: Stable nonobstructing 8 mm left renal stone. Left ureteral stent in place, without hydronephrosis.    WBC: 4.1   Hgb: 9.9  Cr: 0.78-stable  Ucx 7/4/24- no growth       -Right Nephrectomy 2/2 kidney stones in 2012     -seizures  -sciatica    PLAN  Reviewed CT AP 7/3/24  stent in appropriate position  Per chart review pt has been difficult to reach, to further coordinate medical clearance, office appts etc for OP stone treatment. Multiple ER visits noted  Pt adamant to have stent removed-pt with solitary kidney. Hx of Right Nephrectomy 2/2 kidney stones in 2012. Recommend IP stone tx given issues with compliance.    Receiving IV Rocephin 1g Q 24 hrs per ID  NPO after midnight on Sunday 7/7/24 for OR on 
Urology Progress Note        Assessment/Plan:     Patient Active Problem List   Diagnosis    Lumbar radiculopathy    Migraine headache    Seizures (HCC)    Left renal stone    Pulmonary nodule seen on imaging study    Ureteral colic    Generalized abdominal pain    Anxiety    Moderate episode of recurrent major depressive disorder (HCC)    Respiratory failure (HCC)    Acute hypoxic on chronic hypercapnic respiratory failure (HCC)    Polypharmacy    Left upper quadrant abdominal pain    Ureteral stent present    Acute respiratory failure with hypoxia and hypercapnia (HCC)    ARF (acute renal failure) (HCC)    Single kidney    Flank pain    Hypoxia    Sepsis without acute organ dysfunction (HCC)    Pyelonephritis       -Nephrolithiasis.   Surgical History:    S/P cysto/left retrograde pyelogram, left URS w LL stone extraction, JJ stent exchange on 7/8/24 with Dr. Julian at [facility]    S/p cysto; left RPG; left double j ureteral stent placement on 05/25/24 by Dr. Ochoa.     S/p cysto, left retrograde pyelogram, left double-J ureteral stent exchange (7 f x 26cm) on 06/01/24 with   Dr. Agrawal     CT A/P 05/24/24: There is a 5 x 3 mm distal left ureteral stone. Presently, not causing obstruction.  Additional nonobstructing left kidney stones.  Previous right nephrectomy.     CT AP 7/3/24: Stable nonobstructing 8 mm left renal stone. Left ureteral stent in place, without hydronephrosis.    WBC: 4.2   Hgb: 10.8  Cr: 0.69-stable  Ucx 7/4/24- no growth  Ucx 7/6/24-<10K bacteria, no significant growth       -Right Nephrectomy 2/2 kidney stones in 2012     -seizures  -sciatica    PLAN  WBC: 4.2 Hgb: 10.8  sCr: 0.69 stable  Ucx 7/4/24 shows no growth, Ucx 7/6/24 shows <10K bacteria, no significant growth.   Toradol 10 mg PRN and Percocet 5-325 mg PRN for ureteral stent discomfort  Trospium 20 mg PRN for bladder spasms associated with ureteral stent  Patient stable for discharge from urology standpoint. Please send home with 
Rhythm.  GI:  Soft, Non distended, Non tender. + Bowel sounds.  Extremities:  No edema. No calf tenderness.   Psych: Good insight. Not anxious or agitated.  Neurologic: Alert and oriented X 3. Moves all ext.  Additional: Mild left flank tenderness    Medications:   Current Facility-Administered Medications   Medication Dose Route Frequency    0.9 % sodium chloride infusion   IntraVENous Continuous    arformoterol tartrate (BROVANA) nebulizer solution 15 mcg  15 mcg Nebulization BID RT    budesonide (PULMICORT) nebulizer suspension 250 mcg  0.25 mg Nebulization BID RT    HYDROmorphone HCl PF (DILAUDID) injection 0.5 mg  0.5 mg IntraVENous Q4H PRN    traMADol (ULTRAM) tablet 100 mg  100 mg Oral Q8H PRN    enoxaparin Sodium (LOVENOX) injection 30 mg  30 mg SubCUTAneous BID    lactobacillus (CULTURELLE) capsule 1 capsule  1 capsule Oral BID    dicyclomine (BENTYL) capsule 20 mg  20 mg Oral 4x Daily PRN    atorvastatin (LIPITOR) tablet 40 mg  40 mg Oral Nightly    celecoxib (CELEBREX) capsule 100 mg  100 mg Oral BID    linaclotide (LINZESS) capsule 290 mcg  290 mcg Oral Daily    albuterol (PROVENTIL) (2.5 MG/3ML) 0.083% nebulizer solution 2.5 mg  2.5 mg Nebulization 4x Daily PRN    buPROPion (WELLBUTRIN XL) extended release tablet 300 mg  300 mg Oral Daily    zolpidem (AMBIEN) tablet 5 mg  5 mg Oral Nightly PRN    levETIRAcetam (KEPPRA) tablet 1,000 mg  1,000 mg Oral BID    pantoprazole (PROTONIX) tablet 40 mg  40 mg Oral QAM AC    QUEtiapine (SEROquel XR) extended release tablet 300 mg  300 mg Oral Nightly    tamsulosin (FLOMAX) capsule 0.4 mg  0.4 mg Oral Daily    temazepam (RESTORIL) capsule 30 mg  30 mg Oral Nightly PRN    traZODone (DESYREL) tablet 100 mg  100 mg Oral Nightly    sertraline (ZOLOFT) tablet 100 mg  100 mg Oral Daily    HYDROcodone-acetaminophen (NORCO) 7.5-325 MG per tablet 1 tablet  1 tablet Oral Q6H PRN    sodium chloride flush 0.9 % injection 5-40 mL  5-40 mL IntraVENous 2 times per day    sodium 
other (comment) (clinical condition) as evidenced by other (comment) (per pt report)    Nutrition Interventions:   Food and/or Nutrient Delivery: Continue Current Diet, Start Oral Nutrition Supplement  Nutrition Education/Counseling: No recommendation at this time  Coordination of Nutrition Care: Continue to monitor while inpatient       Goals:     Goals: PO intake 75% or greater, by next RD assessment       Nutrition Monitoring and Evaluation:   Behavioral-Environmental Outcomes: None Identified  Food/Nutrient Intake Outcomes: Food and Nutrient Intake, Supplement Intake  Physical Signs/Symptoms Outcomes: Biochemical Data, Meal Time Behavior, Weight, Nutrition Focused Physical Findings    Discharge Planning:    No discharge needs at this time     Jammie Grady RD  Contact: [phone]    
Recent Labs     07/08/24  0539   WBC 4.2*   RBC 3.58*   HGB 10.8*   HCT 32.4*         Cultures Invalid input(s): \"CULT\"  [unfilled]      Urinalysis Potassium   Date Value Ref Range Status   07/08/2024 4.0 3.5 - 5.5 mmol/L Final     BUN   Date Value Ref Range Status   07/08/2024 9 7.0 - 18 MG/DL Final      PSA No results for input(s): \"PSA\" in the last 72 hours.   Coagulation Lab Results   Component Value Date/Time    INR 1.1 05/25/2024 05:44 AM         
    The  provided the following Interventions:  Initiated a relationship of care and support with patient in bed 460  Explored issues of thierry, belief, spirituality and Orthodox/ritual needs while hospitalized Patient is in be 460 where she has been for the last 143 hours due to kidney stones.  Listened empathically to her story and hopes for healing. May be facing surgery.  Offered prayer and assurance of continued prayers on patients behalf.       The following outcomes were achieved:  Patient shared limited information about both their medical narrative and spiritual journey/beliefs.  Patient processed feeling about current hospitalization.  Patient expressed gratitude for pastoral care visit.    Assessment:  Patient does not have any Orthodox/cultural needs that will affect patient’s preferences in health care.  There are no further spiritual or Orthodox issues which require Spiritual Care Services interventions at this time.       Plan:  Chaplains will continue to follow and will provide pastoral care on an as needed/requested basis    . Lex Marino   Board Certified    Spiritual Care   [phone]  
file   Food Insecurity: No Food Insecurity (2024)    Hunger Vital Sign     Worried About Running Out of Food in the Last Year: Never true     Ran Out of Food in the Last Year: Never true   Transportation Needs: No Transportation Needs (2024)    PRAPARE - Transportation     Lack of Transportation (Medical): No     Lack of Transportation (Non-Medical): No   Physical Activity: Not on file   Stress: Not on file   Social Connections: Not on file   Intimate Partner Violence: Not on file   Housing Stability: Low Risk  (2024)    Housing Stability Vital Sign     Unable to Pay for Housing in the Last Year: No     Number of Places Lived in the Last Year: 2     Unstable Housing in the Last Year: No     Social History     Tobacco Use   Smoking Status Never    Passive exposure: Never   Smokeless Tobacco Never        Temp (24hrs), Av.9 °F (36.6 °C), Min:97 °F (36.1 °C), Max:98.5 °F (36.9 °C)    /82   Pulse 91   Temp 97 °F (36.1 °C) (Oral)   Resp 18   Ht 1.626 m (5' 4\")   Wt 113.8 kg (250 lb 14.4 oz)   SpO2 93%   BMI 43.07 kg/m²     ROS: 12 point ROS obtained in details. Pertinent positives as mentioned in HPI,   otherwise negative    Physical Exam:    General:  Laying on the bed,  in no apparent distress   Head:  Normocephalic, without obvious abnormality, atraumatic.   Eyes:  ANicteric,    Nose: Nares normal. Mucosa normal. No drainage or sinus tenderness.    Throat: Lips, mucosa, and tongue normal. Teeth and gums normal    Neck: Supple, symmetrical, trachea midline, no adenopathy, thyroid: no enlargment/tenderness/nodules    Back:   Symmetric   Lungs:   Bilateral chest movement equal, no audible wheezing   Chest wall:  No tenderness or deformity. NO intercostal retractions   Heart:  Regular rate and rhythm, S1, S2 normal, no click, rub or gallop.   Abdomen:   Soft, left upper quadrant/flank tenderness.  No guarding/rigidity bowel sounds normal. No masses,  No organomegaly. NO paradoxical motion 
100.0 FL    MCH 29.9 24.0 - 34.0 PG    MCHC 32.0 31.0 - 37.0 g/dL    RDW 14.3 11.6 - 14.5 %    Platelets 251 135 - 420 K/uL    MPV 10.0 9.2 - 11.8 FL    Nucleated RBCs 0.0 0  WBC    nRBC 0.00 0.00 - 0.01 K/uL    Neutrophils % 39 (L) 40 - 73 %    Lymphocytes % 47 21 - 52 %    Monocytes % 9 3 - 10 %    Eosinophils % 4 0 - 5 %    Basophils % 1 0 - 2 %    Immature Granulocytes % 0 0.0 - 0.5 %    Neutrophils Absolute 1.4 (L) 1.8 - 8.0 K/UL    Lymphocytes Absolute 1.7 0.9 - 3.6 K/UL    Monocytes Absolute 0.3 0.05 - 1.2 K/UL    Eosinophils Absolute 0.2 0.0 - 0.4 K/UL    Basophils Absolute 0.0 0.0 - 0.1 K/UL    Immature Granulocytes Absolute 0.0 0.00 - 0.04 K/UL    Differential Type AUTOMATED         Signed By: Perla Zavala MD     July 4, 2024      Disclaimer: Sections of this note are dictated using utilizing voice recognition software.  Minor typographical errors may be present. If questions arise, please do not hesitate to contact me or call our department.

## 2024-07-09 NOTE — PLAN OF CARE
Problem: Pain  Goal: Verbalizes/displays adequate comfort level or baseline comfort level  7/9/2024 1733 by Judy Roque RN  Outcome: Adequate for Discharge  7/9/2024 1231 by Judy Roque RN  Outcome: Progressing  Flowsheets (Taken 7/9/2024 0806)  Verbalizes/displays adequate comfort level or baseline comfort level: Encourage patient to monitor pain and request assistance     Problem: Safety - Adult  Goal: Free from fall injury  7/9/2024 1733 by Judy Roque RN  Outcome: Adequate for Discharge  7/9/2024 1231 by Judy Roque RN  Outcome: Progressing     Problem: Nutrition Deficit:  Goal: Optimize nutritional status  7/9/2024 1733 by Judy Roque RN  Outcome: Adequate for Discharge  7/9/2024 1231 by Judy Roque, RN  Outcome: Progressing

## 2024-07-09 NOTE — DISCHARGE SUMMARY
Discharge Summary    Patient: Garrison Buenrostro MRN: [id number]  [facility]: [id number]    YOB: 1970  Age: 53 y.o.  Sex: female    DOA: 7/3/2024 LOS:  LOS: 6 days        Disposition: Home    Discharge Date: 7/9/2024    Admission Diagnosis: Abdominal pain [R10.9]    Discharge Diagnosis:    UTI  Post cystoscopy and stone removal and stent exchange 7/8/2024  Nephrolithiasis  History of right nephrectomy, for kidney stones  PTSD, depression and anxiety  History of seizures  Obesity    Discharge Condition: Stable      PHYSICAL EXAM  Visit Vitals  /74   Pulse (!) 102   Temp 99.4 °F (37.4 °C) (Oral)   Resp 18   Ht 1.626 m (5' 4\")   Wt 113.8 kg (250 lb 14.4 oz)   SpO2 96%   BMI 43.07 kg/m²       General: Alert, cooperative, no acute distress    HEENT: PERRLA, EOMI. Anicteric sclerae.  Lungs:  CTA Bilaterally. No Wheezing/Rales.  Heart:             Regular rate and Rhythm.  Abdomen: Soft, Non distended, Non tender. + Bowel sounds.  Extremities: No edema.  Psych:   Good insight. Not anxious or agitated.  Neurologic:  AA, oriented X 3. Moves all ext                                 Hospital Course:   53 years old who is admitted for UTI, and for left ureteral stent exchange tomorrow. The patient presented to ED with complaint of having left flank pain, associated with nausea and vomiting. She has medical history significant for right nephrectomy secondary to kidney stones, and history of recent left ureteral stone and treatment for UTI. No fevers, melena, or hematuria. She received IV Rocephin in ED for UTI.     Patient was admitted to hospital with impression of UTI, flank pain due to stent/stone.  Urology was consulted.  Patient was started on empiric antibiotics IV fluids and pain management.  ID was also consulted.  ID saw the patient recommended to follow-up urine culture, continue antibiotics.  Urology saw the patient recommended definitive stone treatment and stent exchange after UTI treatment.  Patient was

## 2024-07-09 NOTE — ANESTHESIA POSTPROCEDURE EVALUATION
Department of Anesthesiology  Postprocedure Note    Patient: Garrison Buenrostro  MRN: [id number]  YOB: 1970  Date of evaluation: 7/8/2024    Procedure Summary       Date: 07/08/24 Room / Location: [facility] MAIN 08 / [facility] MAIN OR    Anesthesia Start: 1811 Anesthesia Stop: 1906    Procedure: Cystoscopy, left ureteroscopy, laser lithotripsy, stent exchange (Left: Ureter) Diagnosis:       Left ureteral stone      (Left ureteral stone [N20.1])    Surgeons: Wood Julian MD Responsible Provider: Jw Juares Jr., MD    Anesthesia Type: General ASA Status: 3            Anesthesia Type: General    Duncan Phase I: Duncan Score: 9    Duncan Phase II:      Anesthesia Post Evaluation    Patient location during evaluation: bedside  Airway patency: patent  Cardiovascular status: hemodynamically stable  Respiratory status: acceptable  Hydration status: stable  Pain management: adequate    No notable events documented.

## 2024-07-09 NOTE — CARE COORDINATION
Discharge order noted for today. Orders received. No needs identified at this time. Case management remains available as needed.    Family to transport pt home at time of discharge.    Sylvia BRISCOEN,RN, [facility]  Case Management  [phone]

## 2024-08-05 ENCOUNTER — HOSPITAL ENCOUNTER (EMERGENCY)
Facility: HOSPITAL | Age: 54
Discharge: HOME | DRG: 254 | End: 2024-08-08
Payer: MEDICAID

## 2024-08-05 ENCOUNTER — HOSPITAL ENCOUNTER (EMERGENCY)
Facility: HOSPITAL | Age: 54
Discharge: HOME | DRG: 254 | End: 2024-08-06
Attending: STUDENT IN AN ORGANIZED HEALTH CARE EDUCATION/TRAINING PROGRAM
Payer: MEDICAID

## 2024-08-05 ENCOUNTER — APPOINTMENT (OUTPATIENT)
Facility: HOSPITAL | Age: 54
DRG: 254 | End: 2024-08-05
Payer: MEDICAID

## 2024-08-05 DIAGNOSIS — N20.0 KIDNEY STONE: Primary | ICD-10-CM

## 2024-08-05 DIAGNOSIS — R11.2 NAUSEA AND VOMITING, UNSPECIFIED VOMITING TYPE: ICD-10-CM

## 2024-08-05 LAB
ALBUMIN SERPL-MCNC: 4.2 G/DL (ref 3.4–5)
ALBUMIN/GLOB SERPL: 0.8 (ref 0.8–1.7)
ALP SERPL-CCNC: 107 U/L (ref 45–117)
ALT SERPL-CCNC: 28 U/L (ref 13–56)
ANION GAP SERPL CALC-SCNC: 13 MMOL/L (ref 3–18)
AST SERPL-CCNC: 27 U/L (ref 10–38)
BASOPHILS # BLD: 0 K/UL (ref 0–0.1)
BASOPHILS NFR BLD: 0 % (ref 0–2)
BILIRUB SERPL-MCNC: 0.4 MG/DL (ref 0.2–1)
BUN SERPL-MCNC: 12 MG/DL (ref 7–18)
BUN/CREAT SERPL: 12 (ref 12–20)
CALCIUM SERPL-MCNC: 9.9 MG/DL (ref 8.5–10.1)
CHLORIDE SERPL-SCNC: 104 MMOL/L (ref 100–111)
CO2 SERPL-SCNC: 24 MMOL/L (ref 21–32)
CREAT SERPL-MCNC: 0.98 MG/DL (ref 0.6–1.3)
DIFFERENTIAL METHOD BLD: NORMAL
EOSINOPHIL # BLD: 0 K/UL (ref 0–0.4)
EOSINOPHIL NFR BLD: 0 % (ref 0–5)
ERYTHROCYTE [DISTWIDTH] IN BLOOD BY AUTOMATED COUNT: 13.2 % (ref 11.6–14.5)
GLOBULIN SER CALC-MCNC: 5 G/DL (ref 2–4)
GLUCOSE SERPL-MCNC: 96 MG/DL (ref 74–99)
HCT VFR BLD AUTO: 39.4 % (ref 35–45)
HGB BLD-MCNC: 12.9 G/DL (ref 12–16)
IMM GRANULOCYTES # BLD AUTO: 0 K/UL (ref 0–0.04)
IMM GRANULOCYTES NFR BLD AUTO: 0 % (ref 0–0.5)
LIPASE SERPL-CCNC: 37 U/L (ref 13–75)
LYMPHOCYTES # BLD: 1.5 K/UL (ref 0.9–3.6)
LYMPHOCYTES NFR BLD: 21 % (ref 21–52)
MCH RBC QN AUTO: 29.1 PG (ref 24–34)
MCHC RBC AUTO-ENTMCNC: 32.7 G/DL (ref 31–37)
MCV RBC AUTO: 88.9 FL (ref 78–100)
MONOCYTES # BLD: 0.5 K/UL (ref 0.05–1.2)
MONOCYTES NFR BLD: 7 % (ref 3–10)
NEUTS SEG # BLD: 4.9 K/UL (ref 1.8–8)
NEUTS SEG NFR BLD: 72 % (ref 40–73)
NRBC # BLD: 0 K/UL (ref 0–0.01)
NRBC BLD-RTO: 0 PER 100 WBC
NT PRO BNP: 197 PG/ML (ref 0–900)
PLATELET # BLD AUTO: 320 K/UL (ref 135–420)
PMV BLD AUTO: 10.6 FL (ref 9.2–11.8)
POTASSIUM SERPL-SCNC: 3.4 MMOL/L (ref 3.5–5.5)
PROT SERPL-MCNC: 9.2 G/DL (ref 6.4–8.2)
RBC # BLD AUTO: 4.43 M/UL (ref 4.2–5.3)
SODIUM SERPL-SCNC: 141 MMOL/L (ref 136–145)
TROPONIN I SERPL HS-MCNC: 22 NG/L (ref 0–54)
WBC # BLD AUTO: 6.9 K/UL (ref 4.6–13.2)

## 2024-08-05 PROCEDURE — 6370000000 HC RX 637 (ALT 250 FOR IP): Performed by: STUDENT IN AN ORGANIZED HEALTH CARE EDUCATION/TRAINING PROGRAM

## 2024-08-05 PROCEDURE — 85025 COMPLETE CBC W/AUTO DIFF WBC: CPT

## 2024-08-05 PROCEDURE — 96375 TX/PRO/DX INJ NEW DRUG ADDON: CPT

## 2024-08-05 PROCEDURE — 96376 TX/PRO/DX INJ SAME DRUG ADON: CPT

## 2024-08-05 PROCEDURE — 96361 HYDRATE IV INFUSION ADD-ON: CPT

## 2024-08-05 PROCEDURE — 84484 ASSAY OF TROPONIN QUANT: CPT

## 2024-08-05 PROCEDURE — 83690 ASSAY OF LIPASE: CPT

## 2024-08-05 PROCEDURE — 2580000003 HC RX 258: Performed by: STUDENT IN AN ORGANIZED HEALTH CARE EDUCATION/TRAINING PROGRAM

## 2024-08-05 PROCEDURE — 6360000002 HC RX W HCPCS: Performed by: STUDENT IN AN ORGANIZED HEALTH CARE EDUCATION/TRAINING PROGRAM

## 2024-08-05 PROCEDURE — 80053 COMPREHEN METABOLIC PANEL: CPT

## 2024-08-05 PROCEDURE — 83880 ASSAY OF NATRIURETIC PEPTIDE: CPT

## 2024-08-05 PROCEDURE — 96374 THER/PROPH/DIAG INJ IV PUSH: CPT

## 2024-08-05 PROCEDURE — 93005 ELECTROCARDIOGRAM TRACING: CPT | Performed by: STUDENT IN AN ORGANIZED HEALTH CARE EDUCATION/TRAINING PROGRAM

## 2024-08-05 PROCEDURE — 71045 X-RAY EXAM CHEST 1 VIEW: CPT

## 2024-08-05 PROCEDURE — 74176 CT ABD & PELVIS W/O CONTRAST: CPT

## 2024-08-05 RX ORDER — HYDROMORPHONE HYDROCHLORIDE 1 MG/ML
1 INJECTION, SOLUTION INTRAMUSCULAR; INTRAVENOUS; SUBCUTANEOUS ONCE
Status: COMPLETED | OUTPATIENT
Start: 2024-08-06 | End: 2024-08-06

## 2024-08-05 RX ORDER — LEVETIRACETAM 500 MG/5ML
1000 INJECTION, SOLUTION, CONCENTRATE INTRAVENOUS ONCE
Status: COMPLETED | OUTPATIENT
Start: 2024-08-05 | End: 2024-08-05

## 2024-08-05 RX ORDER — FENTANYL CITRATE 50 UG/ML
50 INJECTION, SOLUTION INTRAMUSCULAR; INTRAVENOUS
Status: COMPLETED | OUTPATIENT
Start: 2024-08-05 | End: 2024-08-05

## 2024-08-05 RX ORDER — SODIUM CHLORIDE, SODIUM LACTATE, POTASSIUM CHLORIDE, AND CALCIUM CHLORIDE .6; .31; .03; .02 G/100ML; G/100ML; G/100ML; G/100ML
1000 INJECTION, SOLUTION INTRAVENOUS ONCE
Status: COMPLETED | OUTPATIENT
Start: 2024-08-05 | End: 2024-08-06

## 2024-08-05 RX ORDER — FENTANYL CITRATE 50 UG/ML
50 INJECTION, SOLUTION INTRAMUSCULAR; INTRAVENOUS ONCE
Status: COMPLETED | OUTPATIENT
Start: 2024-08-05 | End: 2024-08-05

## 2024-08-05 RX ORDER — ONDANSETRON 2 MG/ML
4 INJECTION INTRAMUSCULAR; INTRAVENOUS
Status: COMPLETED | OUTPATIENT
Start: 2024-08-05 | End: 2024-08-05

## 2024-08-05 RX ORDER — ACETAMINOPHEN 325 MG/1
650 TABLET ORAL
Status: DISCONTINUED | OUTPATIENT
Start: 2024-08-05 | End: 2024-08-06 | Stop reason: HOSPADM

## 2024-08-05 RX ADMIN — FENTANYL CITRATE 50 MCG: 50 INJECTION INTRAMUSCULAR; INTRAVENOUS at 23:18

## 2024-08-05 RX ADMIN — FENTANYL CITRATE 50 MCG: 50 INJECTION INTRAMUSCULAR; INTRAVENOUS at 22:46

## 2024-08-05 RX ADMIN — LEVETIRACETAM 1000 MG: 100 INJECTION INTRAVENOUS at 22:49

## 2024-08-05 RX ADMIN — ONDANSETRON 4 MG: 2 INJECTION INTRAMUSCULAR; INTRAVENOUS at 22:44

## 2024-08-05 RX ADMIN — SODIUM CHLORIDE, POTASSIUM CHLORIDE, SODIUM LACTATE AND CALCIUM CHLORIDE 1000 ML: 600; 310; 30; 20 INJECTION, SOLUTION INTRAVENOUS at 22:59

## 2024-08-05 ASSESSMENT — PAIN SCALES - GENERAL
PAINLEVEL_OUTOF10: 10
PAINLEVEL_OUTOF10: 10

## 2024-08-05 ASSESSMENT — PAIN DESCRIPTION - DESCRIPTORS
DESCRIPTORS: SHARP
DESCRIPTORS: SHARP

## 2024-08-05 ASSESSMENT — PAIN DESCRIPTION - LOCATION
LOCATION: ABDOMEN
LOCATION: ABDOMEN

## 2024-08-05 ASSESSMENT — PAIN DESCRIPTION - ORIENTATION
ORIENTATION: LEFT;UPPER
ORIENTATION: LEFT;MID

## 2024-08-06 ENCOUNTER — HOSPITAL ENCOUNTER (INPATIENT)
Facility: HOSPITAL | Age: 54
LOS: 6 days | Discharge: HOME | DRG: 254 | End: 2024-08-12
Attending: EMERGENCY MEDICINE | Admitting: HOSPITALIST
Payer: MEDICAID

## 2024-08-06 ENCOUNTER — APPOINTMENT (OUTPATIENT)
Facility: HOSPITAL | Age: 54
DRG: 254 | End: 2024-08-06
Payer: MEDICAID

## 2024-08-06 VITALS
DIASTOLIC BLOOD PRESSURE: 91 MMHG | OXYGEN SATURATION: 94 % | SYSTOLIC BLOOD PRESSURE: 166 MMHG | BODY MASS INDEX: 34.15 KG/M2 | HEART RATE: 102 BPM | RESPIRATION RATE: 23 BRPM | HEIGHT: 64 IN | WEIGHT: 200 LBS | TEMPERATURE: 97.8 F

## 2024-08-06 DIAGNOSIS — R52 INTRACTABLE PAIN: Primary | ICD-10-CM

## 2024-08-06 DIAGNOSIS — A04.72 C. DIFFICILE COLITIS: ICD-10-CM

## 2024-08-06 DIAGNOSIS — E87.6 HYPOKALEMIA: ICD-10-CM

## 2024-08-06 DIAGNOSIS — R82.4 KETONURIA: ICD-10-CM

## 2024-08-06 DIAGNOSIS — K52.9 EPIPLOIC APPENDAGITIS DUE TO ISCHEMIA (HCC): ICD-10-CM

## 2024-08-06 DIAGNOSIS — K55.9 EPIPLOIC APPENDAGITIS DUE TO ISCHEMIA (HCC): ICD-10-CM

## 2024-08-06 PROBLEM — R10.9 INTRACTABLE ABDOMINAL PAIN: Status: ACTIVE | Noted: 2024-08-06

## 2024-08-06 LAB
ALBUMIN SERPL-MCNC: 3.7 G/DL (ref 3.4–5)
ALBUMIN/GLOB SERPL: 0.9 (ref 0.8–1.7)
ALP SERPL-CCNC: 91 U/L (ref 45–117)
ALT SERPL-CCNC: 26 U/L (ref 13–56)
ANION GAP SERPL CALC-SCNC: 13 MMOL/L (ref 3–18)
APPEARANCE UR: CLEAR
APPEARANCE UR: CLEAR
AST SERPL-CCNC: 21 U/L (ref 10–38)
BACTERIA URNS QL MICRO: ABNORMAL /HPF
BASOPHILS # BLD: 0 K/UL (ref 0–0.1)
BASOPHILS NFR BLD: 0 % (ref 0–2)
BILIRUB SERPL-MCNC: 0.4 MG/DL (ref 0.2–1)
BILIRUB UR QL: NEGATIVE
BILIRUB UR QL: NEGATIVE
BUN SERPL-MCNC: 11 MG/DL (ref 7–18)
BUN/CREAT SERPL: 16 (ref 12–20)
CALCIUM SERPL-MCNC: 9 MG/DL (ref 8.5–10.1)
CHLORIDE SERPL-SCNC: 106 MMOL/L (ref 100–111)
CO2 SERPL-SCNC: 22 MMOL/L (ref 21–32)
COLOR UR: YELLOW
COLOR UR: YELLOW
CREAT SERPL-MCNC: 0.69 MG/DL (ref 0.6–1.3)
DIFFERENTIAL METHOD BLD: ABNORMAL
EKG ATRIAL RATE: 105 BPM
EKG ATRIAL RATE: 93 BPM
EKG DIAGNOSIS: NORMAL
EKG DIAGNOSIS: NORMAL
EKG P AXIS: 28 DEGREES
EKG P AXIS: 40 DEGREES
EKG P-R INTERVAL: 140 MS
EKG P-R INTERVAL: 150 MS
EKG Q-T INTERVAL: 332 MS
EKG Q-T INTERVAL: 334 MS
EKG QRS DURATION: 74 MS
EKG QRS DURATION: 78 MS
EKG QTC CALCULATION (BAZETT): 415 MS
EKG QTC CALCULATION (BAZETT): 438 MS
EKG R AXIS: -6 DEGREES
EKG R AXIS: 5 DEGREES
EKG T AXIS: -6 DEGREES
EKG T AXIS: 11 DEGREES
EKG VENTRICULAR RATE: 105 BPM
EKG VENTRICULAR RATE: 93 BPM
EOSINOPHIL # BLD: 0 K/UL (ref 0–0.4)
EOSINOPHIL NFR BLD: 0 % (ref 0–5)
EPITH CASTS URNS QL MICRO: ABNORMAL /LPF (ref 0–5)
EPITH CASTS URNS QL MICRO: NORMAL /LPF (ref 0–5)
ERYTHROCYTE [DISTWIDTH] IN BLOOD BY AUTOMATED COUNT: 13.3 % (ref 11.6–14.5)
GLOBULIN SER CALC-MCNC: 4 G/DL (ref 2–4)
GLUCOSE SERPL-MCNC: 99 MG/DL (ref 74–99)
GLUCOSE UR STRIP.AUTO-MCNC: NEGATIVE MG/DL
GLUCOSE UR STRIP.AUTO-MCNC: NEGATIVE MG/DL
HCG SERPL QL: NEGATIVE
HCT VFR BLD AUTO: 33.2 % (ref 35–45)
HGB BLD-MCNC: 11.1 G/DL (ref 12–16)
HGB UR QL STRIP: NEGATIVE
HGB UR QL STRIP: NEGATIVE
IMM GRANULOCYTES # BLD AUTO: 0 K/UL (ref 0–0.04)
IMM GRANULOCYTES NFR BLD AUTO: 0 % (ref 0–0.5)
KETONES UR QL STRIP.AUTO: 80 MG/DL
KETONES UR QL STRIP.AUTO: 80 MG/DL
LACTATE BLD-SCNC: 0.83 MMOL/L (ref 0.4–2)
LEUKOCYTE ESTERASE UR QL STRIP.AUTO: NEGATIVE
LEUKOCYTE ESTERASE UR QL STRIP.AUTO: NEGATIVE
LIPASE SERPL-CCNC: 74 U/L (ref 13–75)
LYMPHOCYTES # BLD: 1.3 K/UL (ref 0.9–3.6)
LYMPHOCYTES NFR BLD: 20 % (ref 21–52)
MCH RBC QN AUTO: 29.4 PG (ref 24–34)
MCHC RBC AUTO-ENTMCNC: 33.4 G/DL (ref 31–37)
MCV RBC AUTO: 88.1 FL (ref 78–100)
MONOCYTES # BLD: 0.5 K/UL (ref 0.05–1.2)
MONOCYTES NFR BLD: 8 % (ref 3–10)
NEUTS SEG # BLD: 4.7 K/UL (ref 1.8–8)
NEUTS SEG NFR BLD: 72 % (ref 40–73)
NITRITE UR QL STRIP.AUTO: NEGATIVE
NITRITE UR QL STRIP.AUTO: NEGATIVE
NRBC # BLD: 0 K/UL (ref 0–0.01)
NRBC BLD-RTO: 0 PER 100 WBC
PH UR STRIP: 6 (ref 5–8)
PH UR STRIP: 6 (ref 5–8)
PLATELET # BLD AUTO: 241 K/UL (ref 135–420)
PMV BLD AUTO: 10.5 FL (ref 9.2–11.8)
POTASSIUM SERPL-SCNC: 3.1 MMOL/L (ref 3.5–5.5)
PROT SERPL-MCNC: 7.7 G/DL (ref 6.4–8.2)
PROT UR STRIP-MCNC: 100 MG/DL
PROT UR STRIP-MCNC: 300 MG/DL
RBC # BLD AUTO: 3.77 M/UL (ref 4.2–5.3)
RBC #/AREA URNS HPF: NEGATIVE /HPF (ref 0–5)
SODIUM SERPL-SCNC: 141 MMOL/L (ref 136–145)
SP GR UR REFRACTOMETRY: 1.02 (ref 1–1.03)
SP GR UR REFRACTOMETRY: 1.02 (ref 1–1.03)
TROPONIN I SERPL HS-MCNC: 24 NG/L (ref 0–54)
UROBILINOGEN UR QL STRIP.AUTO: 0.2 EU/DL (ref 0.2–1)
UROBILINOGEN UR QL STRIP.AUTO: 1 EU/DL (ref 0.2–1)
WBC # BLD AUTO: 6.5 K/UL (ref 4.6–13.2)
WBC URNS QL MICRO: NEGATIVE /HPF (ref 0–4)
WBC URNS QL MICRO: NORMAL /HPF (ref 0–4)

## 2024-08-06 PROCEDURE — 6360000002 HC RX W HCPCS: Performed by: PHYSICIAN ASSISTANT

## 2024-08-06 PROCEDURE — 96366 THER/PROPH/DIAG IV INF ADDON: CPT

## 2024-08-06 PROCEDURE — 99223 1ST HOSP IP/OBS HIGH 75: CPT | Performed by: PHYSICIAN ASSISTANT

## 2024-08-06 PROCEDURE — 76770 US EXAM ABDO BACK WALL COMP: CPT

## 2024-08-06 PROCEDURE — 96361 HYDRATE IV INFUSION ADD-ON: CPT

## 2024-08-06 PROCEDURE — 96365 THER/PROPH/DIAG IV INF INIT: CPT

## 2024-08-06 PROCEDURE — 81001 URINALYSIS AUTO W/SCOPE: CPT

## 2024-08-06 PROCEDURE — 84703 CHORIONIC GONADOTROPIN ASSAY: CPT

## 2024-08-06 PROCEDURE — 96375 TX/PRO/DX INJ NEW DRUG ADDON: CPT

## 2024-08-06 PROCEDURE — 84484 ASSAY OF TROPONIN QUANT: CPT

## 2024-08-06 PROCEDURE — 87506 IADNA-DNA/RNA PROBE TQ 6-11: CPT

## 2024-08-06 PROCEDURE — 2580000003 HC RX 258: Performed by: PHYSICIAN ASSISTANT

## 2024-08-06 PROCEDURE — 6370000000 HC RX 637 (ALT 250 FOR IP): Performed by: STUDENT IN AN ORGANIZED HEALTH CARE EDUCATION/TRAINING PROGRAM

## 2024-08-06 PROCEDURE — 2580000003 HC RX 258: Performed by: EMERGENCY MEDICINE

## 2024-08-06 PROCEDURE — 2580000003 HC RX 258

## 2024-08-06 PROCEDURE — 85025 COMPLETE CBC W/AUTO DIFF WBC: CPT

## 2024-08-06 PROCEDURE — 93010 ELECTROCARDIOGRAM REPORT: CPT | Performed by: INTERNAL MEDICINE

## 2024-08-06 PROCEDURE — 93005 ELECTROCARDIOGRAM TRACING: CPT | Performed by: EMERGENCY MEDICINE

## 2024-08-06 PROCEDURE — 96374 THER/PROPH/DIAG INJ IV PUSH: CPT

## 2024-08-06 PROCEDURE — 6360000002 HC RX W HCPCS: Performed by: STUDENT IN AN ORGANIZED HEALTH CARE EDUCATION/TRAINING PROGRAM

## 2024-08-06 PROCEDURE — 6360000002 HC RX W HCPCS

## 2024-08-06 PROCEDURE — 80053 COMPREHEN METABOLIC PANEL: CPT

## 2024-08-06 PROCEDURE — 1100000003 HC PRIVATE W/ TELEMETRY

## 2024-08-06 PROCEDURE — 2580000003 HC RX 258: Performed by: STUDENT IN AN ORGANIZED HEALTH CARE EDUCATION/TRAINING PROGRAM

## 2024-08-06 PROCEDURE — 83605 ASSAY OF LACTIC ACID: CPT

## 2024-08-06 PROCEDURE — 83690 ASSAY OF LIPASE: CPT

## 2024-08-06 PROCEDURE — 99285 EMERGENCY DEPT VISIT HI MDM: CPT

## 2024-08-06 PROCEDURE — 80307 DRUG TEST PRSMV CHEM ANLYZR: CPT

## 2024-08-06 PROCEDURE — 6360000002 HC RX W HCPCS: Performed by: EMERGENCY MEDICINE

## 2024-08-06 PROCEDURE — 96376 TX/PRO/DX INJ SAME DRUG ADON: CPT

## 2024-08-06 RX ORDER — POLYETHYLENE GLYCOL 3350 17 G/17G
17 POWDER, FOR SOLUTION ORAL DAILY PRN
Status: DISCONTINUED | OUTPATIENT
Start: 2024-08-06 | End: 2024-08-12 | Stop reason: HOSPADM

## 2024-08-06 RX ORDER — POTASSIUM CHLORIDE 7.45 MG/ML
10 INJECTION INTRAVENOUS PRN
Status: DISCONTINUED | OUTPATIENT
Start: 2024-08-06 | End: 2024-08-12 | Stop reason: HOSPADM

## 2024-08-06 RX ORDER — GABAPENTIN 400 MG/1
800 CAPSULE ORAL 3 TIMES DAILY
Status: DISCONTINUED | OUTPATIENT
Start: 2024-08-06 | End: 2024-08-12 | Stop reason: HOSPADM

## 2024-08-06 RX ORDER — POTASSIUM CHLORIDE 7.45 MG/ML
10 INJECTION INTRAVENOUS
Status: DISPENSED | OUTPATIENT
Start: 2024-08-06 | End: 2024-08-06

## 2024-08-06 RX ORDER — TAMSULOSIN HYDROCHLORIDE 0.4 MG/1
0.4 CAPSULE ORAL
Status: COMPLETED | OUTPATIENT
Start: 2024-08-06 | End: 2024-08-06

## 2024-08-06 RX ORDER — SODIUM CHLORIDE 0.9 % (FLUSH) 0.9 %
5-40 SYRINGE (ML) INJECTION PRN
Status: DISCONTINUED | OUTPATIENT
Start: 2024-08-06 | End: 2024-08-12 | Stop reason: HOSPADM

## 2024-08-06 RX ORDER — SODIUM CHLORIDE, SODIUM LACTATE, POTASSIUM CHLORIDE, AND CALCIUM CHLORIDE .6; .31; .03; .02 G/100ML; G/100ML; G/100ML; G/100ML
1000 INJECTION, SOLUTION INTRAVENOUS ONCE
Status: COMPLETED | OUTPATIENT
Start: 2024-08-06 | End: 2024-08-06

## 2024-08-06 RX ORDER — SODIUM CHLORIDE 9 MG/ML
INJECTION, SOLUTION INTRAVENOUS PRN
Status: DISCONTINUED | OUTPATIENT
Start: 2024-08-06 | End: 2024-08-12 | Stop reason: HOSPADM

## 2024-08-06 RX ORDER — HYDROMORPHONE HYDROCHLORIDE 1 MG/ML
1 INJECTION, SOLUTION INTRAMUSCULAR; INTRAVENOUS; SUBCUTANEOUS EVERY 4 HOURS PRN
Status: DISCONTINUED | OUTPATIENT
Start: 2024-08-06 | End: 2024-08-10

## 2024-08-06 RX ORDER — TRAMADOL HYDROCHLORIDE 50 MG/1
50 TABLET ORAL EVERY 6 HOURS PRN
Status: DISCONTINUED | OUTPATIENT
Start: 2024-08-06 | End: 2024-08-06

## 2024-08-06 RX ORDER — POTASSIUM CHLORIDE 20 MEQ/1
40 TABLET, EXTENDED RELEASE ORAL PRN
Status: DISCONTINUED | OUTPATIENT
Start: 2024-08-06 | End: 2024-08-12 | Stop reason: HOSPADM

## 2024-08-06 RX ORDER — METOCLOPRAMIDE HYDROCHLORIDE 5 MG/ML
10 INJECTION INTRAMUSCULAR; INTRAVENOUS
Status: COMPLETED | OUTPATIENT
Start: 2024-08-06 | End: 2024-08-06

## 2024-08-06 RX ORDER — ONDANSETRON HYDROCHLORIDE 4 MG/5ML
0.1 SOLUTION ORAL 2 TIMES DAILY PRN
Qty: 50 ML | Refills: 0 | Status: ON HOLD | OUTPATIENT
Start: 2024-08-06 | End: 2024-08-12 | Stop reason: HOSPADM

## 2024-08-06 RX ORDER — ALPRAZOLAM 0.25 MG/1
1 TABLET ORAL 4 TIMES DAILY
Status: DISCONTINUED | OUTPATIENT
Start: 2024-08-06 | End: 2024-08-12 | Stop reason: HOSPADM

## 2024-08-06 RX ORDER — ONDANSETRON 4 MG/1
4 TABLET, ORALLY DISINTEGRATING ORAL EVERY 8 HOURS PRN
Status: DISCONTINUED | OUTPATIENT
Start: 2024-08-06 | End: 2024-08-07

## 2024-08-06 RX ORDER — HYDROCODONE BITARTRATE AND ACETAMINOPHEN 5; 325 MG/1; MG/1
1 TABLET ORAL EVERY 4 HOURS PRN
Qty: 8 TABLET | Refills: 0 | Status: ON HOLD | OUTPATIENT
Start: 2024-08-06 | End: 2024-08-12 | Stop reason: HOSPADM

## 2024-08-06 RX ORDER — BUPROPION HYDROCHLORIDE 150 MG/1
150 TABLET ORAL DAILY
Status: DISCONTINUED | OUTPATIENT
Start: 2024-08-07 | End: 2024-08-07

## 2024-08-06 RX ORDER — BISACODYL 10 MG
10 SUPPOSITORY, RECTAL RECTAL DAILY PRN
Status: DISCONTINUED | OUTPATIENT
Start: 2024-08-06 | End: 2024-08-12 | Stop reason: HOSPADM

## 2024-08-06 RX ORDER — ACETAMINOPHEN 325 MG/1
650 TABLET ORAL EVERY 6 HOURS PRN
Status: DISCONTINUED | OUTPATIENT
Start: 2024-08-06 | End: 2024-08-12 | Stop reason: HOSPADM

## 2024-08-06 RX ORDER — TIZANIDINE 2 MG/1
TABLET ORAL
COMMUNITY
Start: 2024-07-22

## 2024-08-06 RX ORDER — HYDROMORPHONE HYDROCHLORIDE 2 MG/1
2 TABLET ORAL EVERY 4 HOURS PRN
Status: DISCONTINUED | OUTPATIENT
Start: 2024-08-06 | End: 2024-08-07

## 2024-08-06 RX ORDER — HYDROMORPHONE HYDROCHLORIDE 1 MG/ML
1 INJECTION, SOLUTION INTRAMUSCULAR; INTRAVENOUS; SUBCUTANEOUS EVERY 4 HOURS PRN
Status: DISCONTINUED | OUTPATIENT
Start: 2024-08-06 | End: 2024-08-06

## 2024-08-06 RX ORDER — 0.9 % SODIUM CHLORIDE 0.9 %
1000 INTRAVENOUS SOLUTION INTRAVENOUS ONCE
Status: COMPLETED | OUTPATIENT
Start: 2024-08-06 | End: 2024-08-06

## 2024-08-06 RX ORDER — LEVETIRACETAM 500 MG/5ML
1000 INJECTION, SOLUTION, CONCENTRATE INTRAVENOUS 2 TIMES DAILY
Status: DISCONTINUED | OUTPATIENT
Start: 2024-08-06 | End: 2024-08-11

## 2024-08-06 RX ORDER — TAMSULOSIN HYDROCHLORIDE 0.4 MG/1
0.4 CAPSULE ORAL DAILY
Qty: 30 CAPSULE | Refills: 0 | Status: SHIPPED | OUTPATIENT
Start: 2024-08-06

## 2024-08-06 RX ORDER — DIPHENHYDRAMINE HYDROCHLORIDE 50 MG/ML
25 INJECTION INTRAMUSCULAR; INTRAVENOUS
Status: COMPLETED | OUTPATIENT
Start: 2024-08-06 | End: 2024-08-06

## 2024-08-06 RX ORDER — SODIUM CHLORIDE 0.9 % (FLUSH) 0.9 %
5-40 SYRINGE (ML) INJECTION EVERY 12 HOURS SCHEDULED
Status: DISCONTINUED | OUTPATIENT
Start: 2024-08-06 | End: 2024-08-12 | Stop reason: HOSPADM

## 2024-08-06 RX ORDER — LORAZEPAM 2 MG/ML
2 INJECTION INTRAMUSCULAR EVERY 6 HOURS PRN
Status: DISCONTINUED | OUTPATIENT
Start: 2024-08-06 | End: 2024-08-12 | Stop reason: HOSPADM

## 2024-08-06 RX ORDER — TAMSULOSIN HYDROCHLORIDE 0.4 MG/1
0.4 CAPSULE ORAL DAILY
Status: DISCONTINUED | OUTPATIENT
Start: 2024-08-07 | End: 2024-08-12 | Stop reason: HOSPADM

## 2024-08-06 RX ORDER — POTASSIUM CHLORIDE 7.45 MG/ML
10 INJECTION INTRAVENOUS
Status: COMPLETED | OUTPATIENT
Start: 2024-08-06 | End: 2024-08-06

## 2024-08-06 RX ORDER — ENOXAPARIN SODIUM 100 MG/ML
40 INJECTION SUBCUTANEOUS DAILY
Status: DISCONTINUED | OUTPATIENT
Start: 2024-08-07 | End: 2024-08-12 | Stop reason: HOSPADM

## 2024-08-06 RX ORDER — QUETIAPINE 300 MG/1
300 TABLET, FILM COATED, EXTENDED RELEASE ORAL NIGHTLY
Status: DISCONTINUED | OUTPATIENT
Start: 2024-08-06 | End: 2024-08-12 | Stop reason: HOSPADM

## 2024-08-06 RX ORDER — ONDANSETRON 2 MG/ML
4 INJECTION INTRAMUSCULAR; INTRAVENOUS
Status: COMPLETED | OUTPATIENT
Start: 2024-08-06 | End: 2024-08-06

## 2024-08-06 RX ORDER — SODIUM CHLORIDE, SODIUM LACTATE, POTASSIUM CHLORIDE, CALCIUM CHLORIDE 600; 310; 30; 20 MG/100ML; MG/100ML; MG/100ML; MG/100ML
INJECTION, SOLUTION INTRAVENOUS CONTINUOUS
Status: DISCONTINUED | OUTPATIENT
Start: 2024-08-06 | End: 2024-08-10

## 2024-08-06 RX ORDER — ACETAMINOPHEN 650 MG/1
650 SUPPOSITORY RECTAL EVERY 6 HOURS PRN
Status: DISCONTINUED | OUTPATIENT
Start: 2024-08-06 | End: 2024-08-12 | Stop reason: HOSPADM

## 2024-08-06 RX ORDER — ONDANSETRON 2 MG/ML
4 INJECTION INTRAMUSCULAR; INTRAVENOUS EVERY 6 HOURS PRN
Status: DISCONTINUED | OUTPATIENT
Start: 2024-08-06 | End: 2024-08-07

## 2024-08-06 RX ORDER — MAGNESIUM SULFATE IN WATER 40 MG/ML
2000 INJECTION, SOLUTION INTRAVENOUS PRN
Status: DISCONTINUED | OUTPATIENT
Start: 2024-08-06 | End: 2024-08-12 | Stop reason: HOSPADM

## 2024-08-06 RX ADMIN — HYDROMORPHONE HYDROCHLORIDE 0.5 MG: 1 INJECTION, SOLUTION INTRAMUSCULAR; INTRAVENOUS; SUBCUTANEOUS at 10:10

## 2024-08-06 RX ADMIN — LEVETIRACETAM 1000 MG: 500 INJECTION, SOLUTION, CONCENTRATE INTRAVENOUS at 21:47

## 2024-08-06 RX ADMIN — SODIUM CHLORIDE, POTASSIUM CHLORIDE, SODIUM LACTATE AND CALCIUM CHLORIDE: 600; 310; 30; 20 INJECTION, SOLUTION INTRAVENOUS at 16:37

## 2024-08-06 RX ADMIN — TAMSULOSIN HYDROCHLORIDE 0.4 MG: 0.4 CAPSULE ORAL at 01:16

## 2024-08-06 RX ADMIN — SODIUM CHLORIDE, POTASSIUM CHLORIDE, SODIUM LACTATE AND CALCIUM CHLORIDE 1000 ML: 600; 310; 30; 20 INJECTION, SOLUTION INTRAVENOUS at 12:59

## 2024-08-06 RX ADMIN — SODIUM CHLORIDE, POTASSIUM CHLORIDE, SODIUM LACTATE AND CALCIUM CHLORIDE: 600; 310; 30; 20 INJECTION, SOLUTION INTRAVENOUS at 18:49

## 2024-08-06 RX ADMIN — SODIUM CHLORIDE, PRESERVATIVE FREE 10 ML: 5 INJECTION INTRAVENOUS at 21:53

## 2024-08-06 RX ADMIN — HYDROMORPHONE HYDROCHLORIDE 1 MG: 1 INJECTION, SOLUTION INTRAMUSCULAR; INTRAVENOUS; SUBCUTANEOUS at 21:44

## 2024-08-06 RX ADMIN — HYDROMORPHONE HYDROCHLORIDE 0.5 MG: 1 INJECTION, SOLUTION INTRAMUSCULAR; INTRAVENOUS; SUBCUTANEOUS at 15:48

## 2024-08-06 RX ADMIN — POTASSIUM CHLORIDE 10 MEQ: 7.46 INJECTION, SOLUTION INTRAVENOUS at 13:03

## 2024-08-06 RX ADMIN — METOCLOPRAMIDE HYDROCHLORIDE 10 MG: 5 INJECTION INTRAMUSCULAR; INTRAVENOUS at 03:55

## 2024-08-06 RX ADMIN — POTASSIUM CHLORIDE 10 MEQ: 7.45 INJECTION INTRAVENOUS at 21:52

## 2024-08-06 RX ADMIN — HYDROMORPHONE HYDROCHLORIDE 1 MG: 1 INJECTION, SOLUTION INTRAMUSCULAR; INTRAVENOUS; SUBCUTANEOUS at 00:01

## 2024-08-06 RX ADMIN — HYDROMORPHONE HYDROCHLORIDE 0.5 MG: 1 INJECTION, SOLUTION INTRAMUSCULAR; INTRAVENOUS; SUBCUTANEOUS at 16:35

## 2024-08-06 RX ADMIN — ONDANSETRON 4 MG: 2 INJECTION INTRAMUSCULAR; INTRAVENOUS at 10:10

## 2024-08-06 RX ADMIN — DIPHENHYDRAMINE HYDROCHLORIDE 25 MG: 50 INJECTION INTRAMUSCULAR; INTRAVENOUS at 00:29

## 2024-08-06 RX ADMIN — SODIUM CHLORIDE 1000 ML: 9 INJECTION, SOLUTION INTRAVENOUS at 01:17

## 2024-08-06 ASSESSMENT — PAIN DESCRIPTION - ORIENTATION
ORIENTATION: LEFT;MID
ORIENTATION: LEFT
ORIENTATION: LEFT;UPPER
ORIENTATION: MID
ORIENTATION: LEFT;MID
ORIENTATION: LEFT

## 2024-08-06 ASSESSMENT — PAIN SCALES - GENERAL
PAINLEVEL_OUTOF10: 9
PAINLEVEL_OUTOF10: 8
PAINLEVEL_OUTOF10: 8
PAINLEVEL_OUTOF10: 10
PAINLEVEL_OUTOF10: 8
PAINLEVEL_OUTOF10: 10
PAINLEVEL_OUTOF10: 8
PAINLEVEL_OUTOF10: 8
PAINLEVEL_OUTOF10: 10

## 2024-08-06 ASSESSMENT — PAIN DESCRIPTION - FREQUENCY: FREQUENCY: CONTINUOUS

## 2024-08-06 ASSESSMENT — PAIN DESCRIPTION - DESCRIPTORS
DESCRIPTORS: ACHING
DESCRIPTORS: STABBING
DESCRIPTORS: ACHING
DESCRIPTORS: STABBING

## 2024-08-06 ASSESSMENT — PAIN DESCRIPTION - LOCATION
LOCATION: ABDOMEN
LOCATION: FLANK
LOCATION: ABDOMEN
LOCATION: ABDOMEN

## 2024-08-06 ASSESSMENT — PAIN - FUNCTIONAL ASSESSMENT
PAIN_FUNCTIONAL_ASSESSMENT: ACTIVITIES ARE NOT PREVENTED
PAIN_FUNCTIONAL_ASSESSMENT: 0-10
PAIN_FUNCTIONAL_ASSESSMENT: 0-10

## 2024-08-06 ASSESSMENT — LIFESTYLE VARIABLES
HOW MANY STANDARD DRINKS CONTAINING ALCOHOL DO YOU HAVE ON A TYPICAL DAY: PATIENT DOES NOT DRINK
HOW OFTEN DO YOU HAVE A DRINK CONTAINING ALCOHOL: NEVER

## 2024-08-06 ASSESSMENT — PAIN DESCRIPTION - ONSET: ONSET: ON-GOING

## 2024-08-06 ASSESSMENT — PAIN DESCRIPTION - DIRECTION: RADIATING_TOWARDS: LOCALIZED

## 2024-08-06 ASSESSMENT — PAIN DESCRIPTION - PAIN TYPE: TYPE: ACUTE PAIN

## 2024-08-06 NOTE — ED NOTES
Pt felt like she was having trouble breathing and having a reaction to dilaudid. Pt face was a little flush. No hives or swelling noted. Informed provider. Orders will follow.

## 2024-08-06 NOTE — ED NOTES
Patient given crackers and water for PO challenge. Patient refused the crackers and water. This RN placed crackers and water on bedside table

## 2024-08-06 NOTE — ED NOTES
Attempted IV access, was able to get blood, when flushed, patient states it hurts and she \"couldn't taste\" the saline like she normally could. IV removed per patient request, ED tech to attempt line.

## 2024-08-06 NOTE — ED NOTES
Pt reports she usually needs IV benadryl with her dilaudid to treat sensation that all her blood is rushing to her head. Pt denies any itching or rash with dilaudid use. Pt educated on therapeutic effects of benadryl and normal side effects of dilaudid, instructed to use call bell if needed. MD notified.

## 2024-08-06 NOTE — ED NOTES
Still unable to obtain IV after multiple attempts by this RN and tech, charge RN to attempt with ultrasound guidance. Ultrasound tech at bedside currently for US of abdomen

## 2024-08-06 NOTE — DISCHARGE INSTRUCTIONS
You were evaluated for kidney stones, nausea and vomiting .  Based on your work-up it was deemed that she was stable for discharge.  Please  your medication of zofran, flomax which was prescribed to you.  Please follow-up with your primary care physician if you have any further concerns and go over your work-up.  If you experience any chest pain, shortness of breath, worsening abdominal pain, vomiting blood, worsening headache, seizures, or any worsening of your symptoms please return to the emergency department immediately.  If you have any pending results or any further questions please contact the emergency department at [phone].

## 2024-08-06 NOTE — ED NOTES
Cardiac monitor in place. Pt voiced no concerns at this time. Pt easy to arouse, alert and orientated. Allergies verified. Medicated as per MAR. Resperations even and unlabored. Call bell within reach. as per provider to attempt a food challenge 10 min after food

## 2024-08-06 NOTE — ED NOTES
Bladder scan complete. Average high is 78. Pt to CT and back. EKG complete. Pt still complaining of pain 10/10 after two doses of fentanyl 50mcg. Pt breathing even and unlabored.

## 2024-08-06 NOTE — ED TRIAGE NOTES
Received patient in triage with c/o diarrhea, left sided abdominal pain and flank pain x 3 days. Pt states that she has one kidney, recently had kidney stones and feels like this is another infection.

## 2024-08-06 NOTE — H&P
[Oxycodone-Acetaminophen] Swelling    Pregabalin Swelling    Verapamil Swelling    Adhesive Tape Rash        Review of Systems:  CONST: no weight loss/gain, no fever or chills, no fatigue  Eyes: No change in vision, no itching or drainage  ENT: No earache, no tinnitus, no sore throat or sinus congestion.   PULM: No shortness of breath, no cough or wheeze.   CV: no pnd or orthopnea, no CP, no palpitations, no edema  GI: + abdominal pain, + nausea, + vomiting + diarrhea, no melena or bright red blood per rectum.   : No urinary frequency, no urgency, no hesitancy or dysuria.   MSK: No joint or muscle pain, + back pain, no neck pain  INTEG: No rash, no itching, no lesions.   NEURO: No headache, no dizziness  PSYCH: No anxiety, no depression      Objective     Physical Exam:  Visit Vitals  BP (!) 182/108   Pulse 92   Temp 98.1 °F (36.7 °C) (Oral)   Resp 18   Ht 1.626 m (5' 4\")   Wt 90.7 kg (200 lb)   SpO2 93%   BMI 34.33 kg/m²       General: appears stated age, alert, appears uncomfortable  Skin: warm, dry, no rashes  Eyes: PERRL, sclera is non-icteric  HENT: normocephalic/atraumatic, moist mucus membranes  Respiratory: CTA with no signs of respiratory distress  Cardiovascular: RRR, no m/r/g, no cyanosis or peripheral edema of extremities  GI: soft, LLQ TTP, + bowel sounds  Neuro: moves all extremities, no focal deficits, normal speech  Psych: appropriate mood and affect, no visual or auditory hallucinations    Laboratory Studies:  Recent Results (from the past 24 hour(s))   CBC with Diff    Collection Time: 08/05/24 10:25 PM   Result Value Ref Range    WBC 6.9 4.6 - 13.2 K/uL    RBC 4.43 4.20 - 5.30 M/uL    Hemoglobin 12.9 12.0 - 16.0 g/dL    Hematocrit 39.4 35.0 - 45.0 %    MCV 88.9 78.0 - 100.0 FL    MCH 29.1 24.0 - 34.0 PG    MCHC 32.7 31.0 - 37.0 g/dL    RDW 13.2 11.6 - 14.5 %    Platelets 320 135 - 420 K/uL    MPV 10.6 9.2 - 11.8 FL    Nucleated RBCs 0.0 0  WBC    nRBC 0.00 0.00 - 0.01 K/uL    Neutrophils  care is 75 minutes    GOPAL Peck

## 2024-08-06 NOTE — ED PROVIDER NOTES
EMERGENCY DEPARTMENT HISTORY AND PHYSICAL EXAM    12:19 AM      Date: 8/5/2024  Patient Name: Garrison Buenrostro    History of Presenting Illness     Chief Complaint   Patient presents with    Abdominal Pain    Diarrhea       History From: Patient    Garrison Buenrostro is a 54 y.o. female   HPI  53-year-old female with history of seizures, kidney stone, single kidney who presents with left flank pain, nausea, vomiting.  Patient had the symptoms going on for the past 2 days.  Denies any trauma, sick contacts, or any other changes.  Pain is 10 out of 10, intermittent, throbbing.  No alleviating factors.  When assessing ROS she has no fevers, chest pain, shortness of breath, changes in bowel movement, any other changes.  However patient says she is having difficulty urinating for the past 2 days.         Nursing Notes were all reviewed and agreed with or any disagreements were addressed in the HPI.    PCP: No primary care provider on file.    No current facility-administered medications for this encounter.     No current outpatient medications on file.     Facility-Administered Medications Ordered in Other Encounters   Medication Dose Route Frequency Provider Last Rate Last Admin    zolpidem (AMBIEN) tablet 10 mg  10 mg Oral Nightly PRN Aime Best MD        lactated ringers IV soln infusion   IntraVENous Continuous Rolando Mccurdy  mL/hr at 08/06/24 1849 New Bag at 08/06/24 1849    sodium chloride flush 0.9 % injection 5-40 mL  5-40 mL IntraVENous 2 times per day Cathryn Schultz PA   10 mL at 08/06/24 2153    sodium chloride flush 0.9 % injection 5-40 mL  5-40 mL IntraVENous PRN Cathryn Schultz PA        0.9 % sodium chloride infusion   IntraVENous PRN Cathryn Schultz PA        potassium chloride (KLOR-CON M) extended release tablet 40 mEq  40 mEq Oral PRN Cathryn Schultz PA        Or    potassium bicarb-citric acid (EFFER-K) effervescent tablet 40 mEq  40 mEq Oral PRN Cathryn Schultz,  distress.      Breath sounds: Normal breath sounds. No wheezing.   Abdominal:      General: Abdomen is flat. Bowel sounds are normal.      Palpations: Abdomen is soft.      Tenderness: There is abdominal tenderness in the left lower quadrant. There is left CVA tenderness.   Musculoskeletal:         General: Normal range of motion.      Cervical back: Normal range of motion and neck supple.   Skin:     General: Skin is warm and dry.   Neurological:      General: No focal deficit present.      Mental Status: She is alert and oriented to person, place, and time. Mental status is at baseline.   Psychiatric:         Mood and Affect: Mood normal.         Behavior: Behavior normal.           Diagnostic Study Results     Labs -  Recent Results (from the past 12 hour(s))   Urinalysis    Collection Time: 08/06/24  3:15 PM   Result Value Ref Range    Color, UA YELLOW      Appearance CLEAR      Specific Gravity, UA 1.017 1.005 - 1.030      pH, Urine 6.0 5.0 - 8.0      Protein,  (A) NEG mg/dL    Glucose, Ur Negative NEG mg/dL    Ketones, Urine 80 (A) NEG mg/dL    Bilirubin, Urine Negative NEG      Blood, Urine Negative NEG      Urobilinogen, Urine 1.0 0.2 - 1.0 EU/dL    Nitrite, Urine Negative NEG      Leukocyte Esterase, Urine Negative NEG     Urinalysis, Micro    Collection Time: 08/06/24  3:15 PM   Result Value Ref Range    WBC, UA 0 to 3 0 - 4 /hpf    Epithelial Cells, UA 1+ 0 - 5 /lpf   POCT lactic acid (lactate)    Collection Time: 08/06/24  3:39 PM   Result Value Ref Range    POC Lactic Acid 0.83 0.40 - 2.00 mmol/L       Radiologic Studies -   XR CHEST PORTABLE   Final Result      No acute pulmonic disease.            Electronically signed by Gregory Egan      CT ABDOMEN PELVIS WO CONTRAST Additional Contrast? None   Final Result      Mild sigmoid epiploic appendagitis .      Prior right nephrectomy. Nonobstructing stones within the left kidney. Interval   removal of the left ureteral stent.

## 2024-08-06 NOTE — ED NOTES
Pt hooked to to monitor, vitals taken.  Pt voiced pain 10 out of 10 at this time. Pt easy to arouse, alert and orientated. Allergies verified. Medicated as per MAR. Resperations even and unlabored. Call bell within reach.

## 2024-08-06 NOTE — ED NOTES
Pt left without discharge paperwork. Pt was then found in the waiting room laying on the ground. Pt was assisted into a chair. Pt then requested more pain medications. Pt was informed that she was discharged and given pain medication prescription that she could . Pt given discharge paperwork by triage.

## 2024-08-06 NOTE — ED NOTES
Patient arrives to ED with report of kidney stone pain, patient moaning, writhing in pain, states one month ago had surgery to remove kidney stone. Also reports she was seen at [facility] earlier this morning, was d/c'd, has not urinated since then and pain is uncontrollable.

## 2024-08-06 NOTE — ED TRIAGE NOTES
Patient presents to ER after being discharged from [facility] and diagnosed with kidney stone. Patient states that she continues to have severe pain and having difficulty keeping water down.

## 2024-08-06 NOTE — ED PROVIDER NOTES
Pt is a 77F with PMHx IDDMII, CKD Stage IV, CHFpEF, latent TB (s/p tx,) hx breast cancer (2018, s/p mastectomy and adjuvant CT/RT), and hx CVA s/p trach/PEG (now decannulated) initially presenting to [facility] on 8/11/23 with acute hypoxemic and hypercapnic respiratory failure s/p ETT intubation/MV and ARF with pulmonary edema c/b HTN emergency requiring nicardipine drip transferred to MICU for further management with failure to wean from ventilator s/p tracheostomy placement and RCU transfer for further management.     Pt initially p/w worsening RUE shaking and dysconjugate gaze with MRI 8/17 (+) new right cerebellar, midbrain, and multiple tiny embolic infarcts as well as known left PCA CVA. EEG (-). 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 ILR in place from prior CVA evaluation, last interrogated 9/7 without AF. At baseline, pt reportedly wheelchair bound with RUE tremors and some ADL assistance. Pt was unable to tolerate ASA 2/2 GIB, will re-attempt once more stable. Worsening mentation this week possibly 2/2 metabolic/infectious encephalopathy in the setting of hyponatremia, uremia, and recurrent infection. No new focal deficits noted. Pt intermittently awakens but only for very short periods of time.    Pt with acute hypoxemic and hypercapnic respiratory failure s/p ETT intubation/MV possibly 2/2 flash pulmonary edema in the setting of HTN emergency +/- aspiration event. Pt with failure of ventilatory weaning now s/p tracheostomy placement (IP 9/1.) Of note, pt also noted to have evidence of midbrain CVA with possible concern for central effect on respiratory drive, will monitor carefully and continue weaning trials as tolerated. Pt able to spontaneously breath intermittently, will continue to attempt further weaning trials. Airway clearance therapy in place. Wean O2 supplementation for goal O2 saturation 90-95%. Oral hygiene and aspiration precautions in place. Trach care and suctioning as per RCU team.     Pt p/w ARF on CKD requiring initiation of HD 2/2 pulmonary edema and metabolic acidosis with metabolic encephalopathy in MICU. Now off HD since 9/20. Pt with (+) UOP on diuretics. No urgent indication for HD today. RIJ Shifabi removed as no longer functioning. Finished prednisone taper for possible AIN. Strict I/Os and daily BMPs in place. Hospital course further c/b acute hypervolemic hyponatremia and uremia in the setting of renal failure not resolving despite hypertonic saline, diuretics, and salt tabs. Now slowly improving, HD as needed. If pt shows s/s requiring HD will need replacement catheter.     Hospital course further c/b recurrent infections, most recently with MSSA bacteremia (9/1, cleared 9/4 and 9/8) with (+) MSSA and ESBL EColi in BAL as well. Pt with possible cefepime vs. cefazolin drug-induced rash followed by transition to vancomycin. Was then found to have left otitis externa (9/5) s/p serial bedside debridement with worsening necrotic tissue/erythema for which she was also initiated on meropenem. Family refusing contrast, non-contrast study obtained with evidence of bone involvement but no indication for further surgical intervention. Pt underent ENT debridement x2 (last 9/21). Found to have new black spores and white discoloration, concern for candida infection. Given fevers and need for debridement fluconazole added to regimen 9/21. Will also continue with meropenem, CT imaging does not show significant enough bone involvement to require prolonged course for osteomyelitis. Plan to also complete vancomycin by level x4 week course through 10/2 (given inability to exclude endocarditis). Discussed with ID, recs appreciated. Obtain pan-CT scan if remains febrile.     Pt also with oropharyngeal dysphagia requiring NGTs followed by UGIB s/p EGD (8/31) found to have esophagitis/erosive gastropathy now on PPI BID. Pt further found to have popliteal DVT. No c/i by GI for A/C, pt initiated on heparin drip with patient-specific pTTs. Will need to ultimately transition to Coumadin x3-6 months after PEG placement.     Dispo pending medical optimization. Pt full code. DVT ppx full A/C with heparin drip. Discussed with pt's family ( and daughter) at bedside daily. seen at the [facility] emergency department less than 24 hours later with left-sided abdominal pain.  Patient said she went home with supportive care and could not  the medications and had increasing pain upon going home and had to come directly to the emergency department.  Patient is concerned if he related to a kidney stone and my evaluation is left upper quadrant pain and CT scan reviewed from last 24 hours show that there is mild epiploic appendagitis of the sigmoid colon as well as intrarenal stones on the left.  A repeat retroperitoneal ultrasound shows no evidence of hydronephrosis making obstructive uropathy unlikely and the patient require IV Dilaudid for supportive care.  Will hydrate, follow urinalysis, and then reevaluate.  Patient's pain may be related to the epiploic appendagitis given there seems to be no acute urinary tract findings.         CT results 8/5/24:    IMPRESSION:     Mild sigmoid epiploic appendagitis .     Prior right nephrectomy. Nonobstructing stones within the left kidney. Interval  removal of the left ureteral stent.    Patient has had recurrent pain, is not tolerating p.o., and after a liter of fluid is made some urine.  Patient's retroperitoneal ultrasound shows no hydronephrosis making obstructive uropathy unlikely.  Patient does have epiploic appendagitis and normal lactate, and reviewed the case with general surgery.  Patient has intractable pain and will be admitted medical service after discussing the case with Dr. Don.  Patient will continue to get IV pain medication and surgery will follow the patient in the hospital.        Patient was given the following medications:  Medications   HYDROmorphone (DILAUDID) injection 0.5 mg (0.5 mg IntraVENous Given 8/6/24 1010)   ondansetron (ZOFRAN) injection 4 mg (4 mg IntraVENous Given 8/6/24 1010)       CONSULTS: (Who and What was discussed)  IP consult to gen sx   IP consult to hospital medicine     Chronic Conditions: Chronic  pain, nephrectomy, recurrent kidney stone    Social Determinants affecting Dx or Tx: None    Records Reviewed (source and summary of external notes): Nursing Notes, Old Medical Records, Previous Radiology Studies, and Previous Laboratory Studies    Procedures    Critical Care Time: Critical Care Time:  The services I provided to this patient were to treat and/or prevent clinically significant deterioration that could result in the failure of one or more body systems and/or organ systems due to recurrent abdominal pain requiring multiple reevaluations and surgery consult.    Services included the following:  -reviewing nursing notes and old charts  -vital sign assessments  -direct patient care  -medication orders and management  -interpreting and reviewing diagnostic studies/labs  -re-evaluations  -documentation time    Aggregate critical care time was 33 minutes, which includes only time during which I was engaged in work directly related to the patient's care as described above, whether I was at bedside or elsewhere in the Emergency Department. It did not include time spent performing other reported procedures or the services of residents, students, nurses, or advance practice providers.       Rolando Mccurdy DO 4:20 PM          Diagnosis     Clinical Impression:   1. Intractable pain    2. Ketonuria    3. Epiploic appendagitis due to ischemia (HCC)        Disposition: Admit      No follow-up provider specified.     Disclaimer: Sections of this note are dictated using utilizing voice recognition software.  Minor typographical errors may be present. If questions arise, please do not hesitate to contact me or call our department.          Rolando Mccurdy MD  08/06/24 [id number]

## 2024-08-06 NOTE — ED NOTES
Cardiac monitor in place. Pt moaning in room in pain at this time. Pt awake, alert and orientated. Allergies verified. Medicated as per MAR. Resperations even and unlabored. Call bell within reach.

## 2024-08-07 PROBLEM — R52 INTRACTABLE PAIN: Status: ACTIVE | Noted: 2024-08-07

## 2024-08-07 LAB
AMPHET UR QL SCN: NEGATIVE
ANION GAP SERPL CALC-SCNC: 11 MMOL/L (ref 3–18)
BARBITURATES UR QL SCN: NEGATIVE
BASOPHILS # BLD: 0 K/UL (ref 0–0.1)
BASOPHILS NFR BLD: 1 % (ref 0–2)
BENZODIAZ UR QL: POSITIVE
BUN SERPL-MCNC: 5 MG/DL (ref 7–18)
BUN/CREAT SERPL: 8 (ref 12–20)
CALCIUM SERPL-MCNC: 9.4 MG/DL (ref 8.5–10.1)
CANNABINOIDS UR QL SCN: NEGATIVE
CHLORIDE SERPL-SCNC: 105 MMOL/L (ref 100–111)
CO2 SERPL-SCNC: 22 MMOL/L (ref 21–32)
COCAINE UR QL SCN: NEGATIVE
CREAT SERPL-MCNC: 0.63 MG/DL (ref 0.6–1.3)
DIFFERENTIAL METHOD BLD: ABNORMAL
EOSINOPHIL # BLD: 0 K/UL (ref 0–0.4)
EOSINOPHIL NFR BLD: 0 % (ref 0–5)
ERYTHROCYTE [DISTWIDTH] IN BLOOD BY AUTOMATED COUNT: 13.2 % (ref 11.6–14.5)
GLUCOSE SERPL-MCNC: 76 MG/DL (ref 74–99)
HCT VFR BLD AUTO: 37 % (ref 35–45)
HGB BLD-MCNC: 11.9 G/DL (ref 12–16)
IMM GRANULOCYTES # BLD AUTO: 0.1 K/UL (ref 0–0.04)
IMM GRANULOCYTES NFR BLD AUTO: 1 % (ref 0–0.5)
LYMPHOCYTES # BLD: 1.7 K/UL (ref 0.9–3.6)
LYMPHOCYTES NFR BLD: 27 % (ref 21–52)
Lab: ABNORMAL
MAGNESIUM SERPL-MCNC: 1.6 MG/DL (ref 1.6–2.6)
MCH RBC QN AUTO: 29.2 PG (ref 24–34)
MCHC RBC AUTO-ENTMCNC: 32.2 G/DL (ref 31–37)
MCV RBC AUTO: 90.9 FL (ref 78–100)
METHADONE UR QL: NEGATIVE
MONOCYTES # BLD: 0.5 K/UL (ref 0.05–1.2)
MONOCYTES NFR BLD: 8 % (ref 3–10)
NEUTS SEG # BLD: 4 K/UL (ref 1.8–8)
NEUTS SEG NFR BLD: 64 % (ref 40–73)
NRBC # BLD: 0 K/UL (ref 0–0.01)
NRBC BLD-RTO: 0 PER 100 WBC
OPIATES UR QL: POSITIVE
PCP UR QL: NEGATIVE
PLATELET # BLD AUTO: 255 K/UL (ref 135–420)
PMV BLD AUTO: 11 FL (ref 9.2–11.8)
POTASSIUM SERPL-SCNC: 3.3 MMOL/L (ref 3.5–5.5)
RBC # BLD AUTO: 4.07 M/UL (ref 4.2–5.3)
SODIUM SERPL-SCNC: 138 MMOL/L (ref 136–145)
WBC # BLD AUTO: 6.3 K/UL (ref 4.6–13.2)

## 2024-08-07 PROCEDURE — 6370000000 HC RX 637 (ALT 250 FOR IP): Performed by: FAMILY MEDICINE

## 2024-08-07 PROCEDURE — 85025 COMPLETE CBC W/AUTO DIFF WBC: CPT

## 2024-08-07 PROCEDURE — 94761 N-INVAS EAR/PLS OXIMETRY MLT: CPT

## 2024-08-07 PROCEDURE — 87493 C DIFF AMPLIFIED PROBE: CPT

## 2024-08-07 PROCEDURE — 80048 BASIC METABOLIC PNL TOTAL CA: CPT

## 2024-08-07 PROCEDURE — 2580000003 HC RX 258: Performed by: EMERGENCY MEDICINE

## 2024-08-07 PROCEDURE — 6360000002 HC RX W HCPCS: Performed by: PHYSICIAN ASSISTANT

## 2024-08-07 PROCEDURE — 99232 SBSQ HOSP IP/OBS MODERATE 35: CPT | Performed by: HOSPITALIST

## 2024-08-07 PROCEDURE — 2580000003 HC RX 258: Performed by: PHYSICIAN ASSISTANT

## 2024-08-07 PROCEDURE — 83735 ASSAY OF MAGNESIUM: CPT

## 2024-08-07 PROCEDURE — 1100000003 HC PRIVATE W/ TELEMETRY

## 2024-08-07 PROCEDURE — 6360000002 HC RX W HCPCS: Performed by: HOSPITALIST

## 2024-08-07 PROCEDURE — 36415 COLL VENOUS BLD VENIPUNCTURE: CPT

## 2024-08-07 PROCEDURE — 87324 CLOSTRIDIUM AG IA: CPT

## 2024-08-07 PROCEDURE — 99222 1ST HOSP IP/OBS MODERATE 55: CPT | Performed by: COLON & RECTAL SURGERY

## 2024-08-07 PROCEDURE — 87449 NOS EACH ORGANISM AG IA: CPT

## 2024-08-07 RX ORDER — ZOLPIDEM TARTRATE 5 MG/1
10 TABLET ORAL NIGHTLY PRN
Status: DISCONTINUED | OUTPATIENT
Start: 2024-08-07 | End: 2024-08-12 | Stop reason: HOSPADM

## 2024-08-07 RX ORDER — ONDANSETRON 2 MG/ML
4 INJECTION INTRAMUSCULAR; INTRAVENOUS EVERY 4 HOURS PRN
Status: DISCONTINUED | OUTPATIENT
Start: 2024-08-07 | End: 2024-08-10

## 2024-08-07 RX ORDER — HYDROMORPHONE HYDROCHLORIDE 2 MG/1
1 TABLET ORAL EVERY 4 HOURS PRN
Status: DISCONTINUED | OUTPATIENT
Start: 2024-08-07 | End: 2024-08-10

## 2024-08-07 RX ORDER — DIPHENHYDRAMINE HCL 25 MG
50 CAPSULE ORAL EVERY 6 HOURS PRN
Status: DISCONTINUED | OUTPATIENT
Start: 2024-08-07 | End: 2024-08-12 | Stop reason: HOSPADM

## 2024-08-07 RX ORDER — NALOXONE HYDROCHLORIDE 0.4 MG/ML
0.4 INJECTION, SOLUTION INTRAMUSCULAR; INTRAVENOUS; SUBCUTANEOUS PRN
Status: DISCONTINUED | OUTPATIENT
Start: 2024-08-07 | End: 2024-08-12 | Stop reason: HOSPADM

## 2024-08-07 RX ORDER — ONDANSETRON 4 MG/1
4 TABLET, ORALLY DISINTEGRATING ORAL EVERY 4 HOURS PRN
Status: DISCONTINUED | OUTPATIENT
Start: 2024-08-07 | End: 2024-08-10

## 2024-08-07 RX ADMIN — HYDROMORPHONE HYDROCHLORIDE 1 MG: 1 INJECTION, SOLUTION INTRAMUSCULAR; INTRAVENOUS; SUBCUTANEOUS at 12:56

## 2024-08-07 RX ADMIN — LEVETIRACETAM 1000 MG: 500 INJECTION, SOLUTION, CONCENTRATE INTRAVENOUS at 21:41

## 2024-08-07 RX ADMIN — ONDANSETRON 4 MG: 2 INJECTION INTRAMUSCULAR; INTRAVENOUS at 21:41

## 2024-08-07 RX ADMIN — HYDROMORPHONE HYDROCHLORIDE 1 MG: 1 INJECTION, SOLUTION INTRAMUSCULAR; INTRAVENOUS; SUBCUTANEOUS at 17:35

## 2024-08-07 RX ADMIN — LEVETIRACETAM 1000 MG: 500 INJECTION, SOLUTION, CONCENTRATE INTRAVENOUS at 08:23

## 2024-08-07 RX ADMIN — ONDANSETRON 4 MG: 2 INJECTION INTRAMUSCULAR; INTRAVENOUS at 01:03

## 2024-08-07 RX ADMIN — ONDANSETRON 4 MG: 2 INJECTION INTRAMUSCULAR; INTRAVENOUS at 08:23

## 2024-08-07 RX ADMIN — ONDANSETRON 4 MG: 2 INJECTION INTRAMUSCULAR; INTRAVENOUS at 12:56

## 2024-08-07 RX ADMIN — SODIUM CHLORIDE, POTASSIUM CHLORIDE, SODIUM LACTATE AND CALCIUM CHLORIDE: 600; 310; 30; 20 INJECTION, SOLUTION INTRAVENOUS at 17:39

## 2024-08-07 RX ADMIN — HYDROMORPHONE HYDROCHLORIDE 1 MG: 1 INJECTION, SOLUTION INTRAMUSCULAR; INTRAVENOUS; SUBCUTANEOUS at 04:29

## 2024-08-07 RX ADMIN — SODIUM CHLORIDE, POTASSIUM CHLORIDE, SODIUM LACTATE AND CALCIUM CHLORIDE: 600; 310; 30; 20 INJECTION, SOLUTION INTRAVENOUS at 10:13

## 2024-08-07 RX ADMIN — SODIUM CHLORIDE, PRESERVATIVE FREE 10 ML: 5 INJECTION INTRAVENOUS at 08:24

## 2024-08-07 RX ADMIN — HYDROMORPHONE HYDROCHLORIDE 1 MG: 1 INJECTION, SOLUTION INTRAMUSCULAR; INTRAVENOUS; SUBCUTANEOUS at 21:41

## 2024-08-07 RX ADMIN — ONDANSETRON 4 MG: 2 INJECTION INTRAMUSCULAR; INTRAVENOUS at 17:35

## 2024-08-07 RX ADMIN — SODIUM CHLORIDE, PRESERVATIVE FREE 10 ML: 5 INJECTION INTRAVENOUS at 21:43

## 2024-08-07 RX ADMIN — ZOLPIDEM TARTRATE 10 MG: 5 TABLET ORAL at 01:01

## 2024-08-07 RX ADMIN — HYDROMORPHONE HYDROCHLORIDE 1 MG: 1 INJECTION, SOLUTION INTRAMUSCULAR; INTRAVENOUS; SUBCUTANEOUS at 08:23

## 2024-08-07 ASSESSMENT — PAIN - FUNCTIONAL ASSESSMENT
PAIN_FUNCTIONAL_ASSESSMENT: PREVENTS OR INTERFERES SOME ACTIVE ACTIVITIES AND ADLS
PAIN_FUNCTIONAL_ASSESSMENT: ACTIVITIES ARE NOT PREVENTED
PAIN_FUNCTIONAL_ASSESSMENT: PREVENTS OR INTERFERES SOME ACTIVE ACTIVITIES AND ADLS
PAIN_FUNCTIONAL_ASSESSMENT: PREVENTS OR INTERFERES SOME ACTIVE ACTIVITIES AND ADLS
PAIN_FUNCTIONAL_ASSESSMENT: ACTIVITIES ARE NOT PREVENTED

## 2024-08-07 ASSESSMENT — PAIN SCALES - GENERAL
PAINLEVEL_OUTOF10: 3
PAINLEVEL_OUTOF10: 3
PAINLEVEL_OUTOF10: 8
PAINLEVEL_OUTOF10: 8
PAINLEVEL_OUTOF10: 0
PAINLEVEL_OUTOF10: 10
PAINLEVEL_OUTOF10: 3
PAINLEVEL_OUTOF10: 7
PAINLEVEL_OUTOF10: 6
PAINLEVEL_OUTOF10: 8
PAINLEVEL_OUTOF10: 3

## 2024-08-07 ASSESSMENT — PAIN DESCRIPTION - ONSET
ONSET: ON-GOING

## 2024-08-07 ASSESSMENT — PAIN DESCRIPTION - FREQUENCY
FREQUENCY: CONTINUOUS

## 2024-08-07 ASSESSMENT — PAIN DESCRIPTION - PAIN TYPE
TYPE: ACUTE PAIN

## 2024-08-07 ASSESSMENT — PAIN DESCRIPTION - ORIENTATION
ORIENTATION: MID

## 2024-08-07 ASSESSMENT — PAIN DESCRIPTION - DIRECTION: RADIATING_TOWARDS: LOCALIZED

## 2024-08-07 ASSESSMENT — PAIN DESCRIPTION - DESCRIPTORS
DESCRIPTORS: ACHING
DESCRIPTORS: SHARP;STABBING
DESCRIPTORS: SHARP
DESCRIPTORS: SHARP;STABBING
DESCRIPTORS: SHARP;STABBING

## 2024-08-07 ASSESSMENT — PAIN DESCRIPTION - LOCATION
LOCATION: ABDOMEN

## 2024-08-07 NOTE — CONSULTS
Left 7/8/2024    Cystoscopy, left ureteroscopy, laser lithotripsy, stent exchange performed by Wood Julian MD at [facility] MAIN OR       Social HX:   Social History     Socioeconomic History    Marital status: Single     Spouse name: Not on file    Number of children: Not on file    Years of education: Not on file    Highest education level: Not on file   Occupational History    Not on file   Tobacco Use    Smoking status: Never     Passive exposure: Never    Smokeless tobacco: Never   Vaping Use    Vaping Use: Never used   Substance and Sexual Activity    Alcohol use: Never    Drug use: Never    Sexual activity: Not on file   Other Topics Concern    Not on file   Social History Narrative    Not on file     Social Determinants of Health     Financial Resource Strain: Not on file   Food Insecurity: No Food Insecurity (8/7/2024)    Hunger Vital Sign     Worried About Running Out of Food in the Last Year: Never true     Ran Out of Food in the Last Year: Never true   Transportation Needs: No Transportation Needs (8/7/2024)    PRAPARE - Transportation     Lack of Transportation (Medical): No     Lack of Transportation (Non-Medical): No   Physical Activity: Not on file   Stress: Not on file   Social Connections: Not on file   Intimate Partner Violence: Not on file   Housing Stability: Low Risk  (8/7/2024)    Housing Stability Vital Sign     Unable to Pay for Housing in the Last Year: No     Number of Places Lived in the Last Year: 2     Unstable Housing in the Last Year: No       FHX:   History reviewed. No pertinent family history.    Allergy:   Allergies   Allergen Reactions    Azithromycin Other (See Comments)    Ketorolac Tromethamine Other (See Comments)    Morphine Hives    Naproxen Other (See Comments)    Percocet [Oxycodone-Acetaminophen] Swelling    Pregabalin Swelling    Verapamil Swelling    Adhesive Tape Rash       Patient Active Problem List   Diagnosis    Lumbar radiculopathy    Migraine headache    Seizures  (HCC)    Left renal stone    Pulmonary nodule seen on imaging study    Ureteral colic    Generalized abdominal pain    Anxiety    Moderate episode of recurrent major depressive disorder (HCC)    Respiratory failure (HCC)    Acute hypoxic on chronic hypercapnic respiratory failure (HCC)    Polypharmacy    Left upper quadrant abdominal pain    Ureteral stent present    Acute respiratory failure with hypoxia and hypercapnia (HCC)    ARF (acute renal failure) (HCC)    Single kidney    Flank pain    Hypoxia    Sepsis without acute organ dysfunction (HCC)    Pyelonephritis    Intractable abdominal pain       Home Medications:     Prior to Admission medications    Medication Sig Start Date End Date Taking? Authorizing Provider   naloxone 4 MG/0.1ML LIQD nasal spray  7/1/24  Yes Kayla Donis MD   tiZANidine (ZANAFLEX) 2 MG tablet  7/22/24  Yes Kayla Donis MD   ondansetron (ZOFRAN) 4 MG/5ML solution Take 6.8 mLs by mouth 2 times daily as needed for Nausea or Vomiting 8/6/24 8/13/24  Bladimir Talley MD   tamsulosin (FLOMAX) 0.4 MG capsule Take 1 capsule by mouth daily 8/6/24   Bladimir Talley MD   HYDROcodone-acetaminophen (NORCO) 5-325 MG per tablet Take 1 tablet by mouth every 4 hours as needed for Pain for up to 5 days. Intended supply: 5 days. Take lowest dose possible to manage pain Max Daily Amount: 6 tablets  Patient not taking: Reported on 8/7/2024 8/6/24 8/11/24  Bladimir Talley MD   levETIRAcetam (KEPPRA) 1000 MG tablet Take 1 tablet by mouth 2 times daily Take one tab twice a day 7/9/24   Perla Zavala MD   acetaminophen (TYLENOL) 650 MG extended release tablet as needed  Patient not taking: Reported on 8/7/2024 7/29/22   Kayla Donis MD   temazepam (RESTORIL) 30 MG capsule  6/10/24   Kayla Donis MD   zolpidem (AMBIEN) 10 MG tablet  6/12/24   Kayla Donis MD   buPROPion (WELLBUTRIN XL) 300 MG extended release tablet Take 1 tablet by mouth

## 2024-08-07 NOTE — CARE COORDINATION
08/07/24 1155   Service Assessment   Patient Orientation Alert and Oriented   Cognition Alert   History Provided By Patient   Primary Caregiver Self   Accompanied By/Relationship Daughter   Support Systems Children;Family Members   Patient's Healthcare Decision Maker is: Legal Next of Kin   PCP Verified by CM Yes  (Pt states has an appointment on 08/16, states does not know PCP name)   Last Visit to PCP Within last two years   Prior Functional Level Independent in ADLs/IADLs   Current Functional Level Independent in ADLs/IADLs   Can patient return to prior living arrangement Yes   Ability to make needs known: Good   Family able to assist with home care needs: Yes   Would you like for me to discuss the discharge plan with any other family members/significant others, and if so, who? Yes   Financial Resources Medicaid   Community Resources None   Social/Functional History   Lives With Family;Daughter   Type of Home House   Home Layout Multi-level;Able to Live on Main level with bedroom/bathroom   Home Access Stairs to enter with rails   Entrance Stairs - Number of Steps 6   Entrance Stairs - Rails Both   Bathroom Shower/Tub Tub/Shower unit   Bathroom Toilet Standard   Bathroom Equipment None   Bathroom Accessibility Accessible   Home Equipment Cane;Walker - 4-Wheeled   Receives Help From Family   ADL Assistance Independent   Bath Independent   Dressing Independent   Grooming Independent   Feeding Independent   Toileting Independent   Homemaking Assistance Independent   Homemaking Responsibilities Yes   Ambulation Assistance Independent   Transfer Assistance Independent   Active  No   Patient's  Info Relies on others and Medicaid transport.   Occupation Unemployed   Discharge Planning   Type of Residence House   Living Arrangements Children;Family Members   Current Services Prior To Admission None   Potential Assistance Needed N/A   Potential Assistance Purchasing Medications No   Type of Home Care  Services None   Patient expects to be discharged to: House   One/Two Story Residence Two story, live on first floor   # of Interior Steps 16   Lift Chair Available No   History of falls? 0   Services At/After Discharge   Transition of Care Consult (CM Consult) Discharge Planning   Internal Home Health No   Services At/After Discharge None    Resource Information Provided? No   Mode of Transport at Discharge Other (see comment)  (Daughter Poppy will transport)   Confirm Follow Up Transport Self   Condition of Participation: Discharge Planning   The Plan for Transition of Care is related to the following treatment goals: Dispo home with family support, daughter will transport home. List HH given to pt for FOC   Freedom of Choice list was provided with basic dialogue that supports the patient's individualized plan of care/goals, treatment preferences, and shares the quality data associated with the providers?  Yes     Dayanna PENA RN  Case Management  [phone]

## 2024-08-07 NOTE — PROGRESS NOTES
HPI: Garrison Buenrostro is a 54 y.o. female presenting with chief complain of abdominal pain.  This began last Thursday.  It is left-sided as well as left flank.  It is a similar pain to the pain she had when she had a urologic procedure earlier this year.  The patient denies any chronic issues with abdominal pain.  She denies nausea or vomiting.    Past Medical History:   Diagnosis Date    PTSD (post-traumatic stress disorder)     Sciatica     Seizures (HCC)        Past Surgical History:   Procedure Laterality Date    APPENDECTOMY      CYSTOSCOPY Left 5/25/2024    CYSTOSCOPY ; LEFT RETROGRADE PYELOGRAM; LEFT DOUBLE J URETERAL STENT INSERTION performed by Junior Ochoa MD at [facility] MAIN OR    CYSTOSCOPY Left 6/1/2024    CYSTOSCOPY LEFT RETROGRADE PYELOGRAM ,  LEFT DOUBLE J STENT EXCHANGE performed by Alessio Agrawal MD at [facility] MAIN OR    KIDNEY REMOVAL Right     2014    LEG SURGERY Left     URETER SURGERY Left 7/8/2024    Cystoscopy, left ureteroscopy, laser lithotripsy, stent exchange performed by Wood Julian MD at [facility] MAIN OR       History reviewed. No pertinent family history.    Social History     Socioeconomic History    Marital status: Single     Spouse name: None    Number of children: None    Years of education: None    Highest education level: None   Tobacco Use    Smoking status: Never     Passive exposure: Never    Smokeless tobacco: Never   Vaping Use    Vaping Use: Never used   Substance and Sexual Activity    Alcohol use: Never    Drug use: Never     Social Determinants of Health     Food Insecurity: No Food Insecurity (8/7/2024)    Hunger Vital Sign     Worried About Running Out of Food in the Last Year: Never true     Ran Out of Food in the Last Year: Never true   Transportation Needs: No Transportation Needs (8/7/2024)    PRAPARE - Transportation     Lack of Transportation (Medical): No     Lack of Transportation (Non-Medical): No   Housing Stability: Low Risk  (8/7/2024)    Housing Stability Vital  nondistended    Lab Results   Component Value Date    WBC 6.3 08/07/2024    HGB 11.9 (L) 08/07/2024    HCT 37.0 08/07/2024    MCV 90.9 08/07/2024     08/07/2024     Lab Results   Component Value Date/Time     08/07/2024 05:50 AM    K 3.3 08/07/2024 05:50 AM     08/07/2024 05:50 AM    CO2 22 08/07/2024 05:50 AM    BUN 5 08/07/2024 05:50 AM    CREATININE 0.63 08/07/2024 05:50 AM    GLUCOSE 76 08/07/2024 05:50 AM    CALCIUM 9.4 08/07/2024 05:50 AM      Lab Results   Component Value Date/Time    MG 1.6 08/07/2024 05:50 AM     Lab Results   Component Value Date    CALCIUM 9.4 08/07/2024    PHOS 3.8 06/02/2024     CT of the abdomen and pelvis personally visualized; minimal stranding at 1 small section of the sigmoid colon which the radiologist opines is epiploic appendagitis, however the degree of inflammation is really minimal    Assessment / Plan    Abdominal pain unclear etiology  May be urologic related as this is the same pain she had prior to her urologic procedure  Management of epiploic appendagitis would usually be nonsteroidals, however the patient states she does not tolerate nonsteroidals  Continues Tylenol for pain, expectant management  She can follow-up with me in the office if pain persists past the next several weeks.  Occasionally we do perform excision of the appendage laparoscopically  Advance diet as tolerated and discharge  Call with questions    The diagnoses and plan were discussed with the patient. All questions answered.  Plan of care agreed to by all concerned.

## 2024-08-07 NOTE — PROGRESS NOTES
[facility]  Progress Note    *ATTENTION:  This note has been created by a medical student for educational purposes only.  Please do not refer to the content of this note for clinical decision-making, billing, or other purposes.  Please see attending physician’s note to obtain clinical information on this patient.*         Patient: Garrison Buenrostro Age: 54 y.o. : 1970 MR#: [id number] SSN:   Date/Time: 2024     Subjective:     Mrs. Buenrostro presented after 3 days of intractable abdominal pain to the ED on 24. PMH of nephrolithiasis leading to a right nephrectomy, sciatica, and seizures. Pain is from midline leftwards to CVA and from ribs 10 to inguinal ligament. No rebound pain, no pain referred outside of boundaries. Pt has not eaten for 5-6 days, since she got dentures in, but has had nausea and vomiting and diarrhea along with the pain. Nothing has relieved or abated the pain. Mrs. Buenrostro and her daughter prefer for a female to be present in the room with any male physician, nurse or provider.  PE  Cardio - tachycardic, regular rhythm  Pulm - difficult to auscultate due to pts positioning and habitus  Abdominal - tender to touch left of midline and from lower ribs to inguinal ligament, bowel sounds absent, belly was soft and without distension or fullness  Labs  Low BUN and BUN/Cre  Ketonuria and proteinuria  Imaging  Retroperitoneal ultrasound: calculus at upper pole, without hydronephrosis and changes to the bladder  CT Abd and Pelvis: stones within L kidney, mild sigmoid epiploic appendagitis           Assessment/Plan:     Abdominal pain - L nephrolithiasis without obstruction or hydronephrosis, continue pain medication and fluids as tolerated  Epiploic appendagitis - possibly contributing to the pain as well, monitor pt pain and consult with GI if there is any acute change or progression of symptoms.     FULL CODE  LEVENOX PROPHYLAXIS      Anticipated  PRN    polyethylene glycol (GLYCOLAX) packet 17 g  17 g Oral Daily PRN    bisacodyl (DULCOLAX) suppository 10 mg  10 mg Rectal Daily PRN    acetaminophen (TYLENOL) tablet 650 mg  650 mg Oral Q6H PRN    Or    acetaminophen (TYLENOL) suppository 650 mg  650 mg Rectal Q6H PRN    ALPRAZolam (XANAX) tablet 1 mg  1 mg Oral 4x Daily    levETIRAcetam (KEPPRA) injection 1,000 mg  1,000 mg IntraVENous BID    QUEtiapine (SEROquel XR) extended release tablet 300 mg  300 mg Oral Nightly    tamsulosin (FLOMAX) capsule 0.4 mg  0.4 mg Oral Daily    sertraline (ZOLOFT) tablet 100 mg  100 mg Oral Daily    gabapentin (NEURONTIN) capsule 800 mg  800 mg Oral TID    buPROPion (WELLBUTRIN XL) extended release tablet 150 mg  150 mg Oral Daily    LORazepam (ATIVAN) injection 2 mg  2 mg IntraVENous Q6H PRN    HYDROmorphone HCl PF (DILAUDID) injection 1 mg  1 mg IntraVENous Q4H PRN       Imaging:   US RETROPERITONEAL COMPLETE    Result Date: 8/6/2024  Normal size, architecture and echogenicity of the left kidney. One or two small nonobstructing calculi may be present. There is no hydronephrosis. The right kidney is surgically absent. Unremarkable bladder. Electronically signed by Gregory Egan    XR CHEST PORTABLE    Result Date: 8/6/2024  No acute pulmonic disease. Electronically signed by Gregory Egan    CT ABDOMEN PELVIS WO CONTRAST Additional Contrast? None    Result Date: 8/6/2024  Mild sigmoid epiploic appendagitis . Prior right nephrectomy. Nonobstructing stones within the left kidney. Interval removal of the left ureteral stent. Electronically signed by Ever Nobles       Labs:    Recent Results (from the past 48 hour(s))   CBC with Diff    Collection Time: 08/05/24 10:25 PM   Result Value Ref Range    WBC 6.9 4.6 - 13.2 K/uL    RBC 4.43 4.20 - 5.30 M/uL    Hemoglobin 12.9 12.0 - 16.0 g/dL    Hematocrit 39.4 35.0 - 45.0 %    MCV 88.9 78.0 - 100.0 FL    MCH 29.1 24.0 - 34.0 PG    MCHC 32.7 31.0 - 37.0 g/dL    RDW 13.2 11.6 - 14.5 %

## 2024-08-07 NOTE — PROGRESS NOTES
[facility] Hospitalist Group  Progress Note    Patient: Garrison Buenrostro Age: 54 y.o. : 1970 MR#: [id number] SSN: [id number]  Date/Time: 2024     Subjective:     Patient seen evaluated, lying in bed, no acute distress.  54-year-old female presents to the emergency room with complaints of abdominal pain, nausea and vomiting.  CT abdomen pelvis consistent with mild epiploic appendagitis.  Patient has a history of right nephrectomy, recent left ureteral stent removal by urology.  Renal ultrasound  shows normal left kidney with 2 small nonobstructing stones, no evidence of hydro.  General surgery consulted    Assessment/Plan:     Mild epiploic appendagitis-treat symptomatically.  Appreciate general surgery input.  History of seizure disorder-continue Keppra  History of PTSD continue Seroquel and Zoloft, patient is not on Wellbutrin  Continue gabapentin  DVT prophylaxis/Lovenox  Full code            Anticipated Discharge and Disposition:    -Home    Anthony Saunders MD  24      Case discussed with:  [x]Patient  []Family  []Nursing  []Case Management  DVT Prophylaxis:  [x]Lovenox  []Hep SQ  []SCDs  []Coumadin   []On Heparin gtt    Objective:   VS:   Vitals:    24 1326   BP:    Pulse:    Resp: 16   Temp:    SpO2:       No intake or output data in the 24 hours ending 24 1412    General:  Alert, cooperative, no acute distress    Pulmonary:  CTA Bilaterally. No Wheezing/Rhonchi/Rales.  Cardiovascular: Regular rate and Rhythm.  GI:  Soft, Non distended, Tender. + Bowel sounds.  Extremities:  No edema, cyanosis, clubbing. No calf tenderness.   Neurologic: Alert and oriented X 3. No acute neuro deficits.  Additional:    Medications:   Current Facility-Administered Medications   Medication Dose Route Frequency    zolpidem (AMBIEN) tablet 10 mg  10 mg Oral Nightly PRN    naloxone (NARCAN) injection 0.4 mg  0.4 mg IntraVENous PRN    HYDROmorphone (DILAUDID) tablet 1 mg  1 mg

## 2024-08-07 NOTE — PROGRESS NOTES
Received orders for iv potassium runs. Pt crying uncontrollably when I arrived in her room. She initially refused the potassium but she finally allowed me to start it. She is refusing all po medications. Pt somewhat paranoid.     2300- Tried to hang next bag of potassium and pt started yelling and saying she told me she didn't want the potassium. Pt informed of potential risk of not getting potassium replacement.

## 2024-08-07 NOTE — PROGRESS NOTES
Bedside and Verbal shift change report given to Kait RN (oncoming nurse) by Felicita RN (offgoing nurse). Report included the following information Nurse Handoff Report, Index, Adult Overview, MAR, Recent Results, Neuro Assessment, and Event Log.        0820:Patient is refusing all PO medications due to nausea and pain. Gave pt PRN zofran and dilaudid as well as IV keppra.      1030: New bag of LR hung. Patient is asking for Zofran again at this time. Educated on when last dose of give and when she can receive it again. Reached out to  about the patient asking for more mediations. He said he will look into it.

## 2024-08-08 LAB
ANION GAP SERPL CALC-SCNC: 10 MMOL/L (ref 3–18)
BASOPHILS # BLD: 0 K/UL (ref 0–0.1)
BASOPHILS NFR BLD: 1 % (ref 0–2)
BUN SERPL-MCNC: 6 MG/DL (ref 7–18)
BUN/CREAT SERPL: 9 (ref 12–20)
C COLI+JEJUNI TUF STL QL NAA+PROBE: NEGATIVE
C DIFF TOXIN INTERPRETATION: ABNORMAL
C. DIFFICILE TOXIN MOLECULAR: POSITIVE
CALCIUM SERPL-MCNC: 9.5 MG/DL (ref 8.5–10.1)
CHLORIDE SERPL-SCNC: 105 MMOL/L (ref 100–111)
CO2 SERPL-SCNC: 22 MMOL/L (ref 21–32)
CREAT SERPL-MCNC: 0.7 MG/DL (ref 0.6–1.3)
DIFFERENTIAL METHOD BLD: ABNORMAL
EC STX1+STX2 GENES STL QL NAA+PROBE: NEGATIVE
EOSINOPHIL # BLD: 0 K/UL (ref 0–0.4)
EOSINOPHIL NFR BLD: 0 % (ref 0–5)
ERYTHROCYTE [DISTWIDTH] IN BLOOD BY AUTOMATED COUNT: 13.1 % (ref 11.6–14.5)
ETEC ELTA+ESTB GENES STL QL NAA+PROBE: NEGATIVE
GLUCOSE SERPL-MCNC: 73 MG/DL (ref 74–99)
HCT VFR BLD AUTO: 35.7 % (ref 35–45)
HGB BLD-MCNC: 11.7 G/DL (ref 12–16)
IMM GRANULOCYTES # BLD AUTO: 0 K/UL (ref 0–0.04)
IMM GRANULOCYTES NFR BLD AUTO: 1 % (ref 0–0.5)
LYMPHOCYTES # BLD: 2 K/UL (ref 0.9–3.6)
LYMPHOCYTES NFR BLD: 32 % (ref 21–52)
MAGNESIUM SERPL-MCNC: 1.6 MG/DL (ref 1.6–2.6)
MCH RBC QN AUTO: 29.5 PG (ref 24–34)
MCHC RBC AUTO-ENTMCNC: 32.8 G/DL (ref 31–37)
MCV RBC AUTO: 90.2 FL (ref 78–100)
MONOCYTES # BLD: 0.4 K/UL (ref 0.05–1.2)
MONOCYTES NFR BLD: 7 % (ref 3–10)
NEUTS SEG # BLD: 3.7 K/UL (ref 1.8–8)
NEUTS SEG NFR BLD: 59 % (ref 40–73)
NRBC # BLD: 0 K/UL (ref 0–0.01)
NRBC BLD-RTO: 0 PER 100 WBC
P SHIGELLOIDES DNA STL QL NAA+PROBE: NEGATIVE
PLATELET # BLD AUTO: 266 K/UL (ref 135–420)
PMV BLD AUTO: 10.7 FL (ref 9.2–11.8)
POTASSIUM SERPL-SCNC: 3.1 MMOL/L (ref 3.5–5.5)
RBC # BLD AUTO: 3.96 M/UL (ref 4.2–5.3)
REFLEX: ABNORMAL
SALMONELLA SP SPAO STL QL NAA+PROBE: NEGATIVE
SHIGELLA SP+EIEC IPAH STL QL NAA+PROBE: NEGATIVE
SODIUM SERPL-SCNC: 137 MMOL/L (ref 136–145)
V CHOL+PARA+VUL DNA STL QL NAA+NON-PROBE: NEGATIVE
WBC # BLD AUTO: 6.3 K/UL (ref 4.6–13.2)
Y ENTEROCOL DNA STL QL NAA+NON-PROBE: NEGATIVE

## 2024-08-08 PROCEDURE — 83735 ASSAY OF MAGNESIUM: CPT

## 2024-08-08 PROCEDURE — 1100000003 HC PRIVATE W/ TELEMETRY

## 2024-08-08 PROCEDURE — 6360000002 HC RX W HCPCS: Performed by: PHYSICIAN ASSISTANT

## 2024-08-08 PROCEDURE — 99232 SBSQ HOSP IP/OBS MODERATE 35: CPT | Performed by: HOSPITALIST

## 2024-08-08 PROCEDURE — 80048 BASIC METABOLIC PNL TOTAL CA: CPT

## 2024-08-08 PROCEDURE — 2580000003 HC RX 258: Performed by: PHYSICIAN ASSISTANT

## 2024-08-08 PROCEDURE — 87086 URINE CULTURE/COLONY COUNT: CPT

## 2024-08-08 PROCEDURE — 6360000002 HC RX W HCPCS: Performed by: HOSPITALIST

## 2024-08-08 PROCEDURE — 6370000000 HC RX 637 (ALT 250 FOR IP): Performed by: PHYSICIAN ASSISTANT

## 2024-08-08 PROCEDURE — 85025 COMPLETE CBC W/AUTO DIFF WBC: CPT

## 2024-08-08 PROCEDURE — 36415 COLL VENOUS BLD VENIPUNCTURE: CPT

## 2024-08-08 PROCEDURE — 6370000000 HC RX 637 (ALT 250 FOR IP): Performed by: FAMILY MEDICINE

## 2024-08-08 PROCEDURE — 94761 N-INVAS EAR/PLS OXIMETRY MLT: CPT

## 2024-08-08 PROCEDURE — 2580000003 HC RX 258: Performed by: EMERGENCY MEDICINE

## 2024-08-08 RX ADMIN — ONDANSETRON 4 MG: 2 INJECTION INTRAMUSCULAR; INTRAVENOUS at 19:57

## 2024-08-08 RX ADMIN — ONDANSETRON 4 MG: 2 INJECTION INTRAMUSCULAR; INTRAVENOUS at 14:08

## 2024-08-08 RX ADMIN — SODIUM CHLORIDE, PRESERVATIVE FREE 10 ML: 5 INJECTION INTRAVENOUS at 14:10

## 2024-08-08 RX ADMIN — SODIUM CHLORIDE, POTASSIUM CHLORIDE, SODIUM LACTATE AND CALCIUM CHLORIDE: 600; 310; 30; 20 INJECTION, SOLUTION INTRAVENOUS at 10:29

## 2024-08-08 RX ADMIN — LEVETIRACETAM 1000 MG: 500 INJECTION, SOLUTION, CONCENTRATE INTRAVENOUS at 21:43

## 2024-08-08 RX ADMIN — SODIUM CHLORIDE, POTASSIUM CHLORIDE, SODIUM LACTATE AND CALCIUM CHLORIDE: 600; 310; 30; 20 INJECTION, SOLUTION INTRAVENOUS at 01:36

## 2024-08-08 RX ADMIN — ALPRAZOLAM 1 MG: 0.25 TABLET ORAL at 21:51

## 2024-08-08 RX ADMIN — ZOLPIDEM TARTRATE 10 MG: 5 TABLET ORAL at 01:31

## 2024-08-08 RX ADMIN — SODIUM CHLORIDE, PRESERVATIVE FREE 10 ML: 5 INJECTION INTRAVENOUS at 21:53

## 2024-08-08 RX ADMIN — LEVETIRACETAM 1000 MG: 500 INJECTION, SOLUTION, CONCENTRATE INTRAVENOUS at 10:12

## 2024-08-08 RX ADMIN — HYDROMORPHONE HYDROCHLORIDE 1 MG: 1 INJECTION, SOLUTION INTRAMUSCULAR; INTRAVENOUS; SUBCUTANEOUS at 19:57

## 2024-08-08 RX ADMIN — ENOXAPARIN SODIUM 40 MG: 100 INJECTION SUBCUTANEOUS at 10:12

## 2024-08-08 RX ADMIN — GABAPENTIN 800 MG: 400 CAPSULE ORAL at 21:48

## 2024-08-08 RX ADMIN — SODIUM CHLORIDE, POTASSIUM CHLORIDE, SODIUM LACTATE AND CALCIUM CHLORIDE: 600; 310; 30; 20 INJECTION, SOLUTION INTRAVENOUS at 20:01

## 2024-08-08 RX ADMIN — QUETIAPINE FUMARATE 300 MG: 300 TABLET, EXTENDED RELEASE ORAL at 22:00

## 2024-08-08 RX ADMIN — HYDROMORPHONE HYDROCHLORIDE 1 MG: 1 INJECTION, SOLUTION INTRAMUSCULAR; INTRAVENOUS; SUBCUTANEOUS at 14:08

## 2024-08-08 RX ADMIN — HYDROMORPHONE HYDROCHLORIDE 1 MG: 1 INJECTION, SOLUTION INTRAMUSCULAR; INTRAVENOUS; SUBCUTANEOUS at 01:31

## 2024-08-08 RX ADMIN — ONDANSETRON 4 MG: 2 INJECTION INTRAMUSCULAR; INTRAVENOUS at 10:12

## 2024-08-08 RX ADMIN — HYDROMORPHONE HYDROCHLORIDE 1 MG: 1 INJECTION, SOLUTION INTRAMUSCULAR; INTRAVENOUS; SUBCUTANEOUS at 10:11

## 2024-08-08 RX ADMIN — SODIUM CHLORIDE, PRESERVATIVE FREE 10 ML: 5 INJECTION INTRAVENOUS at 10:13

## 2024-08-08 ASSESSMENT — PAIN DESCRIPTION - LOCATION
LOCATION: ABDOMEN
LOCATION: FLANK
LOCATION: ABDOMEN
LOCATION: FLANK
LOCATION: FLANK
LOCATION: ABDOMEN;FLANK
LOCATION: ABDOMEN
LOCATION: ABDOMEN;FLANK

## 2024-08-08 ASSESSMENT — PAIN DESCRIPTION - DESCRIPTORS
DESCRIPTORS: SHARP
DESCRIPTORS: SHARP
DESCRIPTORS: ACHING
DESCRIPTORS: SHARP

## 2024-08-08 ASSESSMENT — PAIN DESCRIPTION - ONSET
ONSET: ON-GOING
ONSET: ON-GOING
ONSET: GRADUAL
ONSET: ON-GOING

## 2024-08-08 ASSESSMENT — PAIN SCALES - GENERAL
PAINLEVEL_OUTOF10: 0
PAINLEVEL_OUTOF10: 0
PAINLEVEL_OUTOF10: 8
PAINLEVEL_OUTOF10: 10
PAINLEVEL_OUTOF10: 8
PAINLEVEL_OUTOF10: 9
PAINLEVEL_OUTOF10: 4
PAINLEVEL_OUTOF10: 10
PAINLEVEL_OUTOF10: 0
PAINLEVEL_OUTOF10: 6
PAINLEVEL_OUTOF10: 6
PAINLEVEL_OUTOF10: 0

## 2024-08-08 ASSESSMENT — PAIN DESCRIPTION - PAIN TYPE
TYPE: ACUTE PAIN

## 2024-08-08 ASSESSMENT — PAIN DESCRIPTION - DIRECTION
RADIATING_TOWARDS: LOCALIZED
RADIATING_TOWARDS: FLANK

## 2024-08-08 ASSESSMENT — PAIN - FUNCTIONAL ASSESSMENT
PAIN_FUNCTIONAL_ASSESSMENT: ACTIVITIES ARE NOT PREVENTED

## 2024-08-08 ASSESSMENT — PAIN SCALES - WONG BAKER
WONGBAKER_NUMERICALRESPONSE: NO HURT
WONGBAKER_NUMERICALRESPONSE: NO HURT

## 2024-08-08 ASSESSMENT — PAIN DESCRIPTION - FREQUENCY
FREQUENCY: CONTINUOUS

## 2024-08-08 ASSESSMENT — PAIN DESCRIPTION - ORIENTATION
ORIENTATION: LEFT
ORIENTATION: MID
ORIENTATION: MID
ORIENTATION: LEFT

## 2024-08-08 NOTE — CONSULTS
negative - no cough nondistended.     Female:  Left  CVA tenderness  Skin: No evidence of jaundice.  Normal color  Neuro/Psych:  Alert and oriented. Affect appropriate.   Lymphatic:   No enlarged inguinal lymph nodes.      REVIEW OF LABS AND IMAGING:      Labs: Results:   Chemistry Recent Labs     08/05/24 2225 08/06/24 0900 08/07/24  0550 08/08/24  0420    141 138 137   K 3.4* 3.1* 3.3* 3.1*    106 105 105   CO2 24 22 22 22   BUN 12 11 5* 6*   GLOB 5.0* 4.0  --   --       CBC w/Diff Recent Labs     08/06/24 0900 08/07/24  0550 08/08/24  0420   WBC 6.5 6.3 6.3   RBC 3.77* 4.07* 3.96*   HGB 11.1* 11.9* 11.7*   HCT 33.2* 37.0 35.7    255 266      Cultures Invalid input(s): \"CULT\"  [unfilled]      Urinalysis Potassium   Date Value Ref Range Status   08/08/2024 3.1 (L) 3.5 - 5.5 mmol/L Final     BUN   Date Value Ref Range Status   08/08/2024 6 (L) 7.0 - 18 MG/DL Final      PSA No results for input(s): \"PSA\" in the last 72 hours.   Coagulation Lab Results   Component Value Date/Time    INR 1.1 05/25/2024 05:44 AM           US Results (most recent):  @Ac[employer] Sensing TechnologySTIMGCAT(NIJ5846:1)@       chest    CT Results (most recent):   @BSRepliconSTIMGCAT(LHR2243:1)@       ABD      MRI Results (most recent):  @BSi7 NetworksILASTIMGCAT(IMGxxxxx:1)@                                        1. Intractable pain    2. Ketonuria    3. Epiploic appendagitis due to ischemia (HCC)    4. Hypokalemia         I have reviewed pertinent vitals, I/O's, notes, laboratory values and imaging. I have discussed the case and I agree with the provider's assessment and plan as outlined above, with ammendments as follows:        Wood Julian MD  [facility]  Pager [phone]

## 2024-08-08 NOTE — PROGRESS NOTES
[facility] Hospitalist Group  Progress Note    Patient: Garrison Buenrostro Age: 54 y.o. : 1970 MR#: [id number] SSN: [id number]  Date/Time: 2024     Subjective:     Patient seen evaluated, lying in bed, no acute distress.  54-year-old female presents to the emergency room with complaints of abdominal pain, nausea and vomiting.  CT abdomen pelvis consistent with mild epiploic appendagitis.  Patient has a history of right nephrectomy, recent left ureteral stent removal by urology.  Renal ultrasound  shows normal left kidney with 2 small nonobstructing stones, no evidence of hydro.  General surgery consulted    -patient seen evaluated, lying in bed, no acute distress.  Patient mentions she feels slightly better, abdominal pain has decreased.  She is currently tolerating water.  Will continue clear liquid diet.  Patient and family requesting urology consultation.  Appreciate general surgery and GI consultation.  Anticipate discharge home in a.m. once patient is tolerating a diet.    Assessment/Plan:     Mild epiploic appendagitis-treat symptomatically.  Advance diet to clear liquids and will continue to advance the patient tolerates, appreciate general surgery input.  History of seizure disorder-continue Keppra  History of PTSD continue Seroquel and Zoloft, patient is not on Wellbutrin  Continue gabapentin  DVT prophylaxis/Lovenox  Full code            Anticipated Discharge and Disposition:    -Home    Anthony Saunders MD  24      Case discussed with:  [x]Patient  []Family  []Nursing  []Case Management  DVT Prophylaxis:  [x]Lovenox  []Hep SQ  []SCDs  []Coumadin   []On Heparin gtt    Objective:   VS:   Vitals:    24 1011   BP:    Pulse:    Resp: 16   Temp:    SpO2:         Intake/Output Summary (Last 24 hours) at 2024 1153  Last data filed at 2024 0224  Gross per 24 hour   Intake 3957.89 ml   Output 600 ml   Net 3357.89 ml       General:  Alert, cooperative, no  AM   Result Value Ref Range    WBC 6.3 4.6 - 13.2 K/uL    RBC 3.96 (L) 4.20 - 5.30 M/uL    Hemoglobin 11.7 (L) 12.0 - 16.0 g/dL    Hematocrit 35.7 35.0 - 45.0 %    MCV 90.2 78.0 - 100.0 FL    MCH 29.5 24.0 - 34.0 PG    MCHC 32.8 31.0 - 37.0 g/dL    RDW 13.1 11.6 - 14.5 %    Platelets 266 135 - 420 K/uL    MPV 10.7 9.2 - 11.8 FL    Nucleated RBCs 0.0 0  WBC    nRBC 0.00 0.00 - 0.01 K/uL    Neutrophils % 59 40 - 73 %    Lymphocytes % 32 21 - 52 %    Monocytes % 7 3 - 10 %    Eosinophils % 0 0 - 5 %    Basophils % 1 0 - 2 %    Immature Granulocytes % 1 (H) 0.0 - 0.5 %    Neutrophils Absolute 3.7 1.8 - 8.0 K/UL    Lymphocytes Absolute 2.0 0.9 - 3.6 K/UL    Monocytes Absolute 0.4 0.05 - 1.2 K/UL    Eosinophils Absolute 0.0 0.0 - 0.4 K/UL    Basophils Absolute 0.0 0.0 - 0.1 K/UL    Immature Granulocytes Absolute 0.0 0.00 - 0.04 K/UL    Differential Type AUTOMATED     Magnesium    Collection Time: 08/08/24  4:20 AM   Result Value Ref Range    Magnesium 1.6 1.6 - 2.6 mg/dL       Signed By: Anthony Saunders MD     August 8, 2024      I spent 35 minutes with the patient in face-to-face consultation, of which greater than 50% was spent in counseling and coordination of care as described above    Disclaimer: Sections of this note are dictated using utilizing voice recognition software.  Minor typographical errors may be present. If questions arise, please do not hesitate to contact me or call our department.

## 2024-08-09 PROBLEM — A04.72 COLITIS DUE TO CLOSTRIDIOIDES DIFFICILE: Status: ACTIVE | Noted: 2024-08-09

## 2024-08-09 LAB
ANION GAP SERPL CALC-SCNC: 11 MMOL/L (ref 3–18)
BACTERIA SPEC CULT: NORMAL
BASOPHILS # BLD: 0 K/UL (ref 0–0.1)
BASOPHILS NFR BLD: 1 % (ref 0–2)
BUN SERPL-MCNC: 6 MG/DL (ref 7–18)
BUN/CREAT SERPL: 9 (ref 12–20)
CALCIUM SERPL-MCNC: 8.9 MG/DL (ref 8.5–10.1)
CHLORIDE SERPL-SCNC: 105 MMOL/L (ref 100–111)
CO2 SERPL-SCNC: 24 MMOL/L (ref 21–32)
CREAT SERPL-MCNC: 0.67 MG/DL (ref 0.6–1.3)
DIFFERENTIAL METHOD BLD: ABNORMAL
EOSINOPHIL # BLD: 0.1 K/UL (ref 0–0.4)
EOSINOPHIL NFR BLD: 1 % (ref 0–5)
ERYTHROCYTE [DISTWIDTH] IN BLOOD BY AUTOMATED COUNT: 13.2 % (ref 11.6–14.5)
GLUCOSE SERPL-MCNC: 71 MG/DL (ref 74–99)
HCT VFR BLD AUTO: 34.1 % (ref 35–45)
HGB BLD-MCNC: 11.2 G/DL (ref 12–16)
IMM GRANULOCYTES # BLD AUTO: 0 K/UL (ref 0–0.04)
IMM GRANULOCYTES NFR BLD AUTO: 0 % (ref 0–0.5)
LYMPHOCYTES # BLD: 2.6 K/UL (ref 0.9–3.6)
LYMPHOCYTES NFR BLD: 41 % (ref 21–52)
MAGNESIUM SERPL-MCNC: 1.2 MG/DL (ref 1.6–2.6)
MCH RBC QN AUTO: 29.2 PG (ref 24–34)
MCHC RBC AUTO-ENTMCNC: 32.8 G/DL (ref 31–37)
MCV RBC AUTO: 89 FL (ref 78–100)
MONOCYTES # BLD: 0.5 K/UL (ref 0.05–1.2)
MONOCYTES NFR BLD: 8 % (ref 3–10)
NEUTS SEG # BLD: 3.2 K/UL (ref 1.8–8)
NEUTS SEG NFR BLD: 50 % (ref 40–73)
NRBC # BLD: 0 K/UL (ref 0–0.01)
NRBC BLD-RTO: 0 PER 100 WBC
PLATELET # BLD AUTO: 274 K/UL (ref 135–420)
PMV BLD AUTO: 10.5 FL (ref 9.2–11.8)
POTASSIUM SERPL-SCNC: 2.9 MMOL/L (ref 3.5–5.5)
RBC # BLD AUTO: 3.83 M/UL (ref 4.2–5.3)
SERVICE CMNT-IMP: NORMAL
SODIUM SERPL-SCNC: 140 MMOL/L (ref 136–145)
WBC # BLD AUTO: 6.5 K/UL (ref 4.6–13.2)

## 2024-08-09 PROCEDURE — 6370000000 HC RX 637 (ALT 250 FOR IP): Performed by: INTERNAL MEDICINE

## 2024-08-09 PROCEDURE — 85025 COMPLETE CBC W/AUTO DIFF WBC: CPT

## 2024-08-09 PROCEDURE — 6360000002 HC RX W HCPCS: Performed by: HOSPITALIST

## 2024-08-09 PROCEDURE — 83735 ASSAY OF MAGNESIUM: CPT

## 2024-08-09 PROCEDURE — 2580000003 HC RX 258: Performed by: INTERNAL MEDICINE

## 2024-08-09 PROCEDURE — 80048 BASIC METABOLIC PNL TOTAL CA: CPT

## 2024-08-09 PROCEDURE — 2580000003 HC RX 258: Performed by: PHYSICIAN ASSISTANT

## 2024-08-09 PROCEDURE — 99232 SBSQ HOSP IP/OBS MODERATE 35: CPT | Performed by: HOSPITALIST

## 2024-08-09 PROCEDURE — 6360000002 HC RX W HCPCS: Performed by: INTERNAL MEDICINE

## 2024-08-09 PROCEDURE — 36415 COLL VENOUS BLD VENIPUNCTURE: CPT

## 2024-08-09 PROCEDURE — 6370000000 HC RX 637 (ALT 250 FOR IP): Performed by: PHYSICIAN ASSISTANT

## 2024-08-09 PROCEDURE — 1100000003 HC PRIVATE W/ TELEMETRY

## 2024-08-09 PROCEDURE — 2580000003 HC RX 258: Performed by: EMERGENCY MEDICINE

## 2024-08-09 PROCEDURE — 94761 N-INVAS EAR/PLS OXIMETRY MLT: CPT

## 2024-08-09 PROCEDURE — 6360000002 HC RX W HCPCS: Performed by: PHYSICIAN ASSISTANT

## 2024-08-09 RX ORDER — LACTOBACILLUS RHAMNOSUS GG 10B CELL
1 CAPSULE ORAL
Status: DISCONTINUED | OUTPATIENT
Start: 2024-08-09 | End: 2024-08-12 | Stop reason: HOSPADM

## 2024-08-09 RX ADMIN — ALPRAZOLAM 1 MG: 0.25 TABLET ORAL at 22:01

## 2024-08-09 RX ADMIN — HYDROMORPHONE HYDROCHLORIDE 1 MG: 1 INJECTION, SOLUTION INTRAMUSCULAR; INTRAVENOUS; SUBCUTANEOUS at 09:48

## 2024-08-09 RX ADMIN — HYDROMORPHONE HYDROCHLORIDE 1 MG: 1 INJECTION, SOLUTION INTRAMUSCULAR; INTRAVENOUS; SUBCUTANEOUS at 03:34

## 2024-08-09 RX ADMIN — SODIUM CHLORIDE, POTASSIUM CHLORIDE, SODIUM LACTATE AND CALCIUM CHLORIDE: 600; 310; 30; 20 INJECTION, SOLUTION INTRAVENOUS at 03:41

## 2024-08-09 RX ADMIN — ALPRAZOLAM 1 MG: 0.25 TABLET ORAL at 14:20

## 2024-08-09 RX ADMIN — LEVETIRACETAM 1000 MG: 500 INJECTION, SOLUTION, CONCENTRATE INTRAVENOUS at 09:52

## 2024-08-09 RX ADMIN — QUETIAPINE FUMARATE 300 MG: 300 TABLET, EXTENDED RELEASE ORAL at 22:01

## 2024-08-09 RX ADMIN — HYDROMORPHONE HYDROCHLORIDE 1 MG: 1 INJECTION, SOLUTION INTRAMUSCULAR; INTRAVENOUS; SUBCUTANEOUS at 21:05

## 2024-08-09 RX ADMIN — ONDANSETRON 4 MG: 2 INJECTION INTRAMUSCULAR; INTRAVENOUS at 21:05

## 2024-08-09 RX ADMIN — VANCOMYCIN HYDROCHLORIDE 125 MG: 1 INJECTION, POWDER, LYOPHILIZED, FOR SOLUTION INTRAVENOUS at 23:20

## 2024-08-09 RX ADMIN — POTASSIUM CHLORIDE 10 MEQ: 7.45 INJECTION INTRAVENOUS at 20:16

## 2024-08-09 RX ADMIN — MAGNESIUM SULFATE HEPTAHYDRATE 2000 MG: 40 INJECTION, SOLUTION INTRAVENOUS at 10:00

## 2024-08-09 RX ADMIN — GABAPENTIN 800 MG: 400 CAPSULE ORAL at 22:01

## 2024-08-09 RX ADMIN — ONDANSETRON 4 MG: 2 INJECTION INTRAMUSCULAR; INTRAVENOUS at 03:33

## 2024-08-09 RX ADMIN — LEVETIRACETAM 1000 MG: 500 INJECTION, SOLUTION, CONCENTRATE INTRAVENOUS at 21:13

## 2024-08-09 RX ADMIN — SODIUM CHLORIDE, PRESERVATIVE FREE 10 ML: 5 INJECTION INTRAVENOUS at 09:55

## 2024-08-09 RX ADMIN — POTASSIUM CHLORIDE 10 MEQ: 7.45 INJECTION INTRAVENOUS at 14:40

## 2024-08-09 RX ADMIN — SODIUM CHLORIDE: 9 INJECTION, SOLUTION INTRAVENOUS at 20:16

## 2024-08-09 RX ADMIN — SODIUM CHLORIDE: 9 INJECTION, SOLUTION INTRAVENOUS at 16:06

## 2024-08-09 RX ADMIN — Medication 1 CAPSULE: at 16:03

## 2024-08-09 RX ADMIN — POTASSIUM CHLORIDE 10 MEQ: 7.45 INJECTION INTRAVENOUS at 04:53

## 2024-08-09 RX ADMIN — POTASSIUM CHLORIDE 10 MEQ: 7.45 INJECTION INTRAVENOUS at 23:20

## 2024-08-09 RX ADMIN — HYDROMORPHONE HYDROCHLORIDE 1 MG: 1 INJECTION, SOLUTION INTRAMUSCULAR; INTRAVENOUS; SUBCUTANEOUS at 14:34

## 2024-08-09 RX ADMIN — POTASSIUM CHLORIDE 10 MEQ: 7.45 INJECTION INTRAVENOUS at 22:07

## 2024-08-09 RX ADMIN — MAGNESIUM SULFATE HEPTAHYDRATE 2000 MG: 40 INJECTION, SOLUTION INTRAVENOUS at 06:14

## 2024-08-09 RX ADMIN — ENOXAPARIN SODIUM 40 MG: 100 INJECTION SUBCUTANEOUS at 20:19

## 2024-08-09 RX ADMIN — HYDROMORPHONE HYDROCHLORIDE 1 MG: 1 INJECTION, SOLUTION INTRAMUSCULAR; INTRAVENOUS; SUBCUTANEOUS at 00:07

## 2024-08-09 RX ADMIN — GABAPENTIN 800 MG: 400 CAPSULE ORAL at 14:34

## 2024-08-09 RX ADMIN — GABAPENTIN 800 MG: 400 CAPSULE ORAL at 09:52

## 2024-08-09 RX ADMIN — SODIUM CHLORIDE, PRESERVATIVE FREE 10 ML: 5 INJECTION INTRAVENOUS at 21:21

## 2024-08-09 RX ADMIN — ONDANSETRON 4 MG: 2 INJECTION INTRAMUSCULAR; INTRAVENOUS at 09:48

## 2024-08-09 RX ADMIN — ONDANSETRON 4 MG: 2 INJECTION INTRAMUSCULAR; INTRAVENOUS at 00:07

## 2024-08-09 RX ADMIN — ONDANSETRON 4 MG: 2 INJECTION INTRAMUSCULAR; INTRAVENOUS at 14:33

## 2024-08-09 RX ADMIN — VANCOMYCIN HYDROCHLORIDE 125 MG: 1 INJECTION, POWDER, LYOPHILIZED, FOR SOLUTION INTRAVENOUS at 20:17

## 2024-08-09 ASSESSMENT — PAIN DESCRIPTION - ORIENTATION
ORIENTATION: LEFT
ORIENTATION: LEFT;LOWER

## 2024-08-09 ASSESSMENT — PAIN SCALES - GENERAL
PAINLEVEL_OUTOF10: 0
PAINLEVEL_OUTOF10: 5
PAINLEVEL_OUTOF10: 10
PAINLEVEL_OUTOF10: 5
PAINLEVEL_OUTOF10: 9
PAINLEVEL_OUTOF10: 10
PAINLEVEL_OUTOF10: 9
PAINLEVEL_OUTOF10: 5
PAINLEVEL_OUTOF10: 0
PAINLEVEL_OUTOF10: 6
PAINLEVEL_OUTOF10: 10
PAINLEVEL_OUTOF10: 6
PAINLEVEL_OUTOF10: 6
PAINLEVEL_OUTOF10: 0

## 2024-08-09 ASSESSMENT — PAIN DESCRIPTION - FREQUENCY
FREQUENCY: CONTINUOUS

## 2024-08-09 ASSESSMENT — PAIN DESCRIPTION - PAIN TYPE
TYPE: ACUTE PAIN

## 2024-08-09 ASSESSMENT — PAIN DESCRIPTION - LOCATION
LOCATION: ABDOMEN
LOCATION: ABDOMEN
LOCATION: ABDOMEN;FLANK
LOCATION: ABDOMEN

## 2024-08-09 ASSESSMENT — PAIN SCALES - WONG BAKER
WONGBAKER_NUMERICALRESPONSE: NO HURT

## 2024-08-09 ASSESSMENT — PAIN DESCRIPTION - DESCRIPTORS
DESCRIPTORS: SHARP
DESCRIPTORS: SHARP
DESCRIPTORS: ACHING
DESCRIPTORS: SHARP
DESCRIPTORS: ACHING
DESCRIPTORS: SHARP
DESCRIPTORS: SQUEEZING

## 2024-08-09 ASSESSMENT — PAIN - FUNCTIONAL ASSESSMENT
PAIN_FUNCTIONAL_ASSESSMENT: ACTIVITIES ARE NOT PREVENTED

## 2024-08-09 ASSESSMENT — PAIN DESCRIPTION - DIRECTION
RADIATING_TOWARDS: FLANK

## 2024-08-09 ASSESSMENT — PAIN DESCRIPTION - ONSET
ONSET: AWAKENED FROM SLEEP
ONSET: GRADUAL
ONSET: ON-GOING

## 2024-08-09 NOTE — CONSULTS
Infectious Disease Consultation Note        Reason:c.difficile colitis    Current abx Prior abx         Lines:       Assessment :   53 y.o. female with a PMHx of epilepsy, PTSD, sciatica, right nephrectomy who presented to the ED with c/o left sided abdominal and flank pain ongoing for 5 days.    Clinical presentation consistent with C. difficile colitis    Recommendations:    Start oral vancomycin  Start probiotics  Monitor abdominal pain, CBC, diarrhea to determine duration of treatment, need for treatment modification    Thank you for consultation request. Above plan was discussed in details with patient, daughter at bedside and dr Saunders. Please call me if any further questions or concerns. Will continue to participate in the care of this patient.  HPI:     53 y.o. female with a PMHx of epilepsy, PTSD, sciatica, right nephrectomy who presented to the ED with c/o left sided abdominal and flank pain ongoing for 5 days. Pain has been worsening and associated with NVD. She describes the pain as stabbing and constant 10/10. Dilaudid improved the pain to 8/10. She denies sob, cough, cp, fever, chills.  She was seen in the ED on 8/5 and discharged home. She states she wasn't able to  her medications from the pharmacy so she continued to feel bad today which brought her back to the ED. In the ED, she was hypertensive to 182/108. HR low 100s improved with IV fluids. Labs with K 3.1, lactic 0.83, WBC 6.5, UA with 80 ketones. CTAP on 8/5 revealed mild sigmoid colitis and nonobstructing stones within the left kidney with interval removal of left ureteral stent. Renal US 8/6 with normal left kidney and still the two small nonobstructing stones. No hydronephrosis. General surgery was consulted in the ED. stool C. difficile now positive.  I have been consulted for further recommendations.     Patient continues to have abdominal pain at this time.  She had 3 loose bowel movements yesterday and 1 this a.m.  She denies  34.1*    266 274      Microbiology Results       Procedure Component Value Units Date/Time    Culture, Urine [[id number]] Collected: 08/08/24 1603    Order Status: Sent Specimen: Urine, clean catch Updated: 08/08/24 2347    Clostridium difficile toxin/antigen [[id number]]  (Abnormal) Collected: 08/07/24 2100    Order Status: Completed Specimen: Stool Updated: 08/08/24 1418     Reflex       See Reflex order for C. difficile (DNA)           C Diff Toxin Interpretation       Indeterminate for Toxigenic C. difficile, DNA confirmation to follow.          C. difficile toxin Molecular [[id number]]  (Abnormal) Collected: 08/07/24 2100    Order Status: Completed Specimen: Stool Updated: 08/08/24 1418     C. difficile toxin Molecular Positive        Comment: This specimen is positive for toxigenic C difficile by DNA amplification.  Repeat testing is not recommended, samples received within 7 days of this positive result will be rejected. Assay is not approved to test for cure, since nucleic acids may persist after effective treatment and may prompt false positive results.  CALLED TO AND CORRECTLY REPEATED BY:  ANDERZ BEYER RN 4S ON [id number] AT 1417 TO [facility]         Enteric Bact Panel, DNA [[id number]] Collected: 08/06/24 2100    Order Status: Completed Specimen: Stool Updated: 08/08/24 2131     SHIGELLA SPECIES, DNA Negative        CAMPYLOBACTER SPECIES, DNA Negative        VIBRIO SPECIES, DNA Negative        ENTEROTOXIGEN E COLI, DNA Negative        SHIGA TOXIN PRODUCING, DNA Negative        SALMONELLA SPECIES, DNA Negative        P. SHIGELLOIDES, DNA Negative        Y. ENTEROCOLITICA, DNA Negative                   RADIOLOGY:    All available imaging studies/reports in [facility] for this admission were reviewed      Disclaimer: Sections of this note are dictated utilizing voice recognition software, which may have resulted in some phonetic based errors in grammar and contents. Even though attempts were made to correct

## 2024-08-09 NOTE — PROGRESS NOTES
[facility] Hospitalist Group  Progress Note    Patient: Garrison Buenrostro Age: 54 y.o. : 1970 MR#: [id number] SSN: [id number]  Date/Time: 2024     Subjective:     Patient seen evaluated, lying in bed, no acute distress.  54-year-old female presents to the emergency room with complaints of abdominal pain, nausea and vomiting.  CT abdomen pelvis consistent with mild epiploic appendagitis.  Patient has a history of right nephrectomy, recent left ureteral stent removal by urology.  Renal ultrasound  shows normal left kidney with 2 small nonobstructing stones, no evidence of hydro.  General surgery consulted    -patient seen evaluated, lying in bed, no acute distress.  Patient mentions she feels slightly better, abdominal pain has decreased.  She is currently tolerating water.  Will continue clear liquid diet.  Patient and family requesting urology consultation.  Appreciate general surgery and GI consultation.  Anticipate discharge home in a.m. once patient is tolerating a diet.     -patient seen evaluated, lying in bed, no acute distress.  Abdominal pain is better, continue full liquid diet.  Appreciate urology input.  Patient tested positive for C. difficile, ID consulted.  Continue to follow.  Anticipate discharge home in 1 to 2 days.    Assessment/Plan:     Mild epiploic appendagitis-treat symptomatically.  Advance diet to clear liquids and will continue to advance the patient tolerates, appreciate general surgery input.  C. difficile positive-appreciate ID input, continue p.o. vancomycin.  History of seizure disorder-continue Keppra  History of PTSD continue Seroquel and Zoloft, patient is not on Wellbutrin  Continue gabapentin  DVT prophylaxis/Lovenox  Full code            Anticipated Discharge and Disposition:    -Home    Anthony Saunders MD  24      Case discussed with:  [x]Patient  []Family  []Nursing  []Case Management  DVT Prophylaxis:  [x]Lovenox  []Hep SQ   Oral Daily PRN    bisacodyl (DULCOLAX) suppository 10 mg  10 mg Rectal Daily PRN    acetaminophen (TYLENOL) tablet 650 mg  650 mg Oral Q6H PRN    Or    acetaminophen (TYLENOL) suppository 650 mg  650 mg Rectal Q6H PRN    ALPRAZolam (XANAX) tablet 1 mg  1 mg Oral 4x Daily    levETIRAcetam (KEPPRA) injection 1,000 mg  1,000 mg IntraVENous BID    QUEtiapine (SEROquel XR) extended release tablet 300 mg  300 mg Oral Nightly    tamsulosin (FLOMAX) capsule 0.4 mg  0.4 mg Oral Daily    sertraline (ZOLOFT) tablet 100 mg  100 mg Oral Daily    gabapentin (NEURONTIN) capsule 800 mg  800 mg Oral TID    LORazepam (ATIVAN) injection 2 mg  2 mg IntraVENous Q6H PRN    HYDROmorphone HCl PF (DILAUDID) injection 1 mg  1 mg IntraVENous Q4H PRN       Imaging:   US RETROPERITONEAL COMPLETE    Result Date: 8/6/2024  Normal size, architecture and echogenicity of the left kidney. One or two small nonobstructing calculi may be present. There is no hydronephrosis. The right kidney is surgically absent. Unremarkable bladder. Electronically signed by [employer]    XR CHEST PORTABLE    Result Date: 8/6/2024  No acute pulmonic disease. Electronically signed by [employer]    CT ABDOMEN PELVIS WO CONTRAST Additional Contrast? None    Result Date: 8/6/2024  Mild sigmoid epiploic appendagitis . Prior right nephrectomy. Nonobstructing stones within the left kidney. Interval removal of the left ureteral stent. Electronically signed by Ever Nobles       Labs:    Recent Results (from the past 48 hour(s))   Clostridium difficile toxin/antigen    Collection Time: 08/07/24  9:00 PM    Specimen: Stool   Result Value Ref Range    Reflex See Reflex order for C. difficile (DNA)      C Diff Toxin Interpretation (A) NTXCD       Indeterminate for Toxigenic C. difficile, DNA confirmation to follow.   C. difficile toxin Molecular    Collection Time: 08/07/24  9:00 PM    Specimen: Stool   Result Value Ref Range    C. difficile toxin Molecular Positive (A) NEG

## 2024-08-10 ENCOUNTER — APPOINTMENT (OUTPATIENT)
Facility: HOSPITAL | Age: 54
DRG: 254 | End: 2024-08-10
Payer: MEDICAID

## 2024-08-10 LAB
ANION GAP SERPL CALC-SCNC: 11 MMOL/L (ref 3–18)
BASOPHILS # BLD: 0 K/UL (ref 0–0.1)
BASOPHILS NFR BLD: 1 % (ref 0–2)
BUN SERPL-MCNC: 6 MG/DL (ref 7–18)
BUN/CREAT SERPL: 9 (ref 12–20)
CALCIUM SERPL-MCNC: 8.9 MG/DL (ref 8.5–10.1)
CHLORIDE SERPL-SCNC: 107 MMOL/L (ref 100–111)
CO2 SERPL-SCNC: 23 MMOL/L (ref 21–32)
CREAT SERPL-MCNC: 0.69 MG/DL (ref 0.6–1.3)
DIFFERENTIAL METHOD BLD: ABNORMAL
EOSINOPHIL # BLD: 0.1 K/UL (ref 0–0.4)
EOSINOPHIL NFR BLD: 2 % (ref 0–5)
ERYTHROCYTE [DISTWIDTH] IN BLOOD BY AUTOMATED COUNT: 13.3 % (ref 11.6–14.5)
GLUCOSE SERPL-MCNC: 83 MG/DL (ref 74–99)
HCT VFR BLD AUTO: 35.1 % (ref 35–45)
HGB BLD-MCNC: 11.1 G/DL (ref 12–16)
IMM GRANULOCYTES # BLD AUTO: 0 K/UL (ref 0–0.04)
IMM GRANULOCYTES NFR BLD AUTO: 0 % (ref 0–0.5)
LYMPHOCYTES # BLD: 2.2 K/UL (ref 0.9–3.6)
LYMPHOCYTES NFR BLD: 43 % (ref 21–52)
MAGNESIUM SERPL-MCNC: 1.7 MG/DL (ref 1.6–2.6)
MCH RBC QN AUTO: 28.8 PG (ref 24–34)
MCHC RBC AUTO-ENTMCNC: 31.6 G/DL (ref 31–37)
MCV RBC AUTO: 90.9 FL (ref 78–100)
MONOCYTES # BLD: 0.4 K/UL (ref 0.05–1.2)
MONOCYTES NFR BLD: 8 % (ref 3–10)
NEUTS SEG # BLD: 2.3 K/UL (ref 1.8–8)
NEUTS SEG NFR BLD: 47 % (ref 40–73)
NRBC # BLD: 0 K/UL (ref 0–0.01)
NRBC BLD-RTO: 0 PER 100 WBC
PLATELET # BLD AUTO: 272 K/UL (ref 135–420)
PMV BLD AUTO: 10.5 FL (ref 9.2–11.8)
POTASSIUM SERPL-SCNC: 3 MMOL/L (ref 3.5–5.5)
RBC # BLD AUTO: 3.86 M/UL (ref 4.2–5.3)
SODIUM SERPL-SCNC: 141 MMOL/L (ref 136–145)
WBC # BLD AUTO: 5 K/UL (ref 4.6–13.2)

## 2024-08-10 PROCEDURE — 6360000002 HC RX W HCPCS: Performed by: INTERNAL MEDICINE

## 2024-08-10 PROCEDURE — 2580000003 HC RX 258: Performed by: PHYSICIAN ASSISTANT

## 2024-08-10 PROCEDURE — 85025 COMPLETE CBC W/AUTO DIFF WBC: CPT

## 2024-08-10 PROCEDURE — 6370000000 HC RX 637 (ALT 250 FOR IP): Performed by: HOSPITALIST

## 2024-08-10 PROCEDURE — 2580000003 HC RX 258: Performed by: EMERGENCY MEDICINE

## 2024-08-10 PROCEDURE — 83735 ASSAY OF MAGNESIUM: CPT

## 2024-08-10 PROCEDURE — 6360000002 HC RX W HCPCS: Performed by: PHYSICIAN ASSISTANT

## 2024-08-10 PROCEDURE — 6360000002 HC RX W HCPCS: Performed by: HOSPITALIST

## 2024-08-10 PROCEDURE — 80048 BASIC METABOLIC PNL TOTAL CA: CPT

## 2024-08-10 PROCEDURE — 94761 N-INVAS EAR/PLS OXIMETRY MLT: CPT

## 2024-08-10 PROCEDURE — 99232 SBSQ HOSP IP/OBS MODERATE 35: CPT | Performed by: HOSPITALIST

## 2024-08-10 PROCEDURE — 36415 COLL VENOUS BLD VENIPUNCTURE: CPT

## 2024-08-10 PROCEDURE — 6370000000 HC RX 637 (ALT 250 FOR IP): Performed by: PHYSICIAN ASSISTANT

## 2024-08-10 PROCEDURE — 73130 X-RAY EXAM OF HAND: CPT

## 2024-08-10 PROCEDURE — 1100000003 HC PRIVATE W/ TELEMETRY

## 2024-08-10 PROCEDURE — 6370000000 HC RX 637 (ALT 250 FOR IP): Performed by: INTERNAL MEDICINE

## 2024-08-10 PROCEDURE — 2580000003 HC RX 258: Performed by: INTERNAL MEDICINE

## 2024-08-10 RX ORDER — LORAZEPAM 0.5 MG/1
0.5 TABLET ORAL ONCE
Status: DISCONTINUED | OUTPATIENT
Start: 2024-08-10 | End: 2024-08-12 | Stop reason: HOSPADM

## 2024-08-10 RX ORDER — OXYCODONE HYDROCHLORIDE 5 MG/1
5 TABLET ORAL EVERY 6 HOURS PRN
Status: DISCONTINUED | OUTPATIENT
Start: 2024-08-10 | End: 2024-08-12 | Stop reason: HOSPADM

## 2024-08-10 RX ORDER — ONDANSETRON 4 MG/1
4 TABLET, ORALLY DISINTEGRATING ORAL EVERY 6 HOURS PRN
Status: DISCONTINUED | OUTPATIENT
Start: 2024-08-10 | End: 2024-08-12 | Stop reason: HOSPADM

## 2024-08-10 RX ADMIN — ALPRAZOLAM 1 MG: 0.25 TABLET ORAL at 22:08

## 2024-08-10 RX ADMIN — GABAPENTIN 800 MG: 400 CAPSULE ORAL at 14:21

## 2024-08-10 RX ADMIN — ALPRAZOLAM 1 MG: 0.25 TABLET ORAL at 17:49

## 2024-08-10 RX ADMIN — SODIUM CHLORIDE, PRESERVATIVE FREE 10 ML: 5 INJECTION INTRAVENOUS at 22:09

## 2024-08-10 RX ADMIN — ONDANSETRON 4 MG: 2 INJECTION INTRAMUSCULAR; INTRAVENOUS at 11:17

## 2024-08-10 RX ADMIN — GABAPENTIN 800 MG: 400 CAPSULE ORAL at 22:08

## 2024-08-10 RX ADMIN — SODIUM CHLORIDE, PRESERVATIVE FREE 10 ML: 5 INJECTION INTRAVENOUS at 10:22

## 2024-08-10 RX ADMIN — GABAPENTIN 800 MG: 400 CAPSULE ORAL at 10:20

## 2024-08-10 RX ADMIN — VANCOMYCIN HYDROCHLORIDE 125 MG: 1 INJECTION, POWDER, LYOPHILIZED, FOR SOLUTION INTRAVENOUS at 23:49

## 2024-08-10 RX ADMIN — POTASSIUM CHLORIDE 10 MEQ: 7.45 INJECTION INTRAVENOUS at 10:43

## 2024-08-10 RX ADMIN — Medication 1 CAPSULE: at 10:31

## 2024-08-10 RX ADMIN — SODIUM CHLORIDE, POTASSIUM CHLORIDE, SODIUM LACTATE AND CALCIUM CHLORIDE: 600; 310; 30; 20 INJECTION, SOLUTION INTRAVENOUS at 06:47

## 2024-08-10 RX ADMIN — POTASSIUM CHLORIDE 10 MEQ: 7.45 INJECTION INTRAVENOUS at 13:15

## 2024-08-10 RX ADMIN — ENOXAPARIN SODIUM 40 MG: 100 INJECTION SUBCUTANEOUS at 10:21

## 2024-08-10 RX ADMIN — SERTRALINE 100 MG: 50 TABLET, FILM COATED ORAL at 10:20

## 2024-08-10 RX ADMIN — OXYCODONE HYDROCHLORIDE 5 MG: 5 TABLET ORAL at 23:49

## 2024-08-10 RX ADMIN — ALPRAZOLAM 1 MG: 0.25 TABLET ORAL at 14:20

## 2024-08-10 RX ADMIN — VANCOMYCIN HYDROCHLORIDE 125 MG: 1 INJECTION, POWDER, LYOPHILIZED, FOR SOLUTION INTRAVENOUS at 06:40

## 2024-08-10 RX ADMIN — HYDROMORPHONE HYDROCHLORIDE 1 MG: 1 INJECTION, SOLUTION INTRAMUSCULAR; INTRAVENOUS; SUBCUTANEOUS at 11:17

## 2024-08-10 RX ADMIN — VANCOMYCIN HYDROCHLORIDE 125 MG: 1 INJECTION, POWDER, LYOPHILIZED, FOR SOLUTION INTRAVENOUS at 17:55

## 2024-08-10 RX ADMIN — VANCOMYCIN HYDROCHLORIDE 125 MG: 1 INJECTION, POWDER, LYOPHILIZED, FOR SOLUTION INTRAVENOUS at 10:20

## 2024-08-10 RX ADMIN — ONDANSETRON 4 MG: 4 TABLET, ORALLY DISINTEGRATING ORAL at 17:55

## 2024-08-10 RX ADMIN — LEVETIRACETAM 1000 MG: 500 INJECTION, SOLUTION, CONCENTRATE INTRAVENOUS at 10:25

## 2024-08-10 RX ADMIN — POTASSIUM CHLORIDE 10 MEQ: 7.45 INJECTION INTRAVENOUS at 12:07

## 2024-08-10 RX ADMIN — TAMSULOSIN HYDROCHLORIDE 0.4 MG: 0.4 CAPSULE ORAL at 10:20

## 2024-08-10 RX ADMIN — ALPRAZOLAM 1 MG: 0.25 TABLET ORAL at 10:20

## 2024-08-10 RX ADMIN — POTASSIUM CHLORIDE 10 MEQ: 7.45 INJECTION INTRAVENOUS at 14:20

## 2024-08-10 RX ADMIN — ONDANSETRON 4 MG: 2 INJECTION INTRAMUSCULAR; INTRAVENOUS at 06:40

## 2024-08-10 RX ADMIN — QUETIAPINE FUMARATE 300 MG: 300 TABLET, EXTENDED RELEASE ORAL at 22:08

## 2024-08-10 RX ADMIN — OXYCODONE HYDROCHLORIDE 5 MG: 5 TABLET ORAL at 17:55

## 2024-08-10 RX ADMIN — POTASSIUM CHLORIDE 10 MEQ: 7.45 INJECTION INTRAVENOUS at 15:27

## 2024-08-10 RX ADMIN — HYDROMORPHONE HYDROCHLORIDE 1 MG: 1 INJECTION, SOLUTION INTRAMUSCULAR; INTRAVENOUS; SUBCUTANEOUS at 06:41

## 2024-08-10 ASSESSMENT — PAIN DESCRIPTION - ORIENTATION
ORIENTATION: RIGHT;LEFT;MID
ORIENTATION: LEFT

## 2024-08-10 ASSESSMENT — PAIN SCALES - GENERAL
PAINLEVEL_OUTOF10: 7
PAINLEVEL_OUTOF10: 3
PAINLEVEL_OUTOF10: 7
PAINLEVEL_OUTOF10: 7
PAINLEVEL_OUTOF10: 9
PAINLEVEL_OUTOF10: 7
PAINLEVEL_OUTOF10: 6
PAINLEVEL_OUTOF10: 8
PAINLEVEL_OUTOF10: 2
PAINLEVEL_OUTOF10: 5
PAINLEVEL_OUTOF10: 7
PAINLEVEL_OUTOF10: 7

## 2024-08-10 ASSESSMENT — PAIN DESCRIPTION - DESCRIPTORS
DESCRIPTORS: SHARP
DESCRIPTORS: ACHING

## 2024-08-10 ASSESSMENT — PAIN - FUNCTIONAL ASSESSMENT
PAIN_FUNCTIONAL_ASSESSMENT: ACTIVITIES ARE NOT PREVENTED

## 2024-08-10 ASSESSMENT — PAIN DESCRIPTION - PAIN TYPE
TYPE: ACUTE PAIN

## 2024-08-10 ASSESSMENT — PAIN DESCRIPTION - ONSET
ONSET: ON-GOING
ONSET: GRADUAL
ONSET: ON-GOING

## 2024-08-10 ASSESSMENT — PAIN DESCRIPTION - FREQUENCY
FREQUENCY: INTERMITTENT
FREQUENCY: CONTINUOUS
FREQUENCY: CONTINUOUS

## 2024-08-10 ASSESSMENT — PAIN DESCRIPTION - LOCATION
LOCATION: ABDOMEN

## 2024-08-10 NOTE — SIGNIFICANT EVENT
INTERIM UPDATE - 0800 EST on 8/10/2024    Nursing Staff reports that Patient stated that she thought she broke her finger due to being positioned strangely while she slept.  When Nursing Staff next entered the room, Patient reported feeling \"pins and needles\" sensations all over her body.    Plan:  Will order a light dose of PO Lorazepam for presumed anxiety (versus full-body paraesthesias?).

## 2024-08-10 NOTE — PROGRESS NOTES
5766...paged hospitalist d/t pt states \"my body feels like pins and needles all over.\"    8500...per Dr. Diaz, oral ativan will be ordered

## 2024-08-10 NOTE — PROGRESS NOTES
[facility] Hospitalist Group  Progress Note    Patient: Garrison Buenrostro Age: 54 y.o. : 1970 MR#: [id number] SSN: [id number]  Date/Time: 8/10/2024     Subjective:     Patient seen evaluated, lying in bed, no acute distress.  54-year-old female presents to the emergency room with complaints of abdominal pain, nausea and vomiting.  CT abdomen pelvis consistent with mild epiploic appendagitis.  Patient has a history of right nephrectomy, recent left ureteral stent removal by urology.  Renal ultrasound  shows normal left kidney with 2 small nonobstructing stones, no evidence of hydro.  General surgery consulted    -patient seen evaluated, lying in bed, no acute distress.  Patient mentions she feels slightly better, abdominal pain has decreased.  She is currently tolerating water.  Will continue clear liquid diet.  Patient and family requesting urology consultation.  Appreciate general surgery and GI consultation.  Anticipate discharge home in a.m. once patient is tolerating a diet.     -patient seen evaluated, lying in bed, no acute distress.  Abdominal pain is better, continue full liquid diet.  Appreciate urology input.  Patient tested positive for C. difficile, ID consulted.  Continue to follow.  Anticipate discharge home in 1 to 2 days.    8/10-patient seen evaluated, lying in bed, no acute distress.  Abdominal pain is improved.  Patient is currently tolerating a diet.  C. difficile positive, patient is on p.o. vancomycin to be discharged home on p.o. vancomycin however difficult to find at this time, have asked patient to discharge to pharmacy.  If able to arrange, patient will be discharged home today versus tomorrow, continue to follow.    Assessment/Plan:     Mild epiploic appendagitis-treat symptomatically.  Advance diet to clear liquids and will continue to advance the patient tolerates, appreciate general surgery input.  C. difficile positive-appreciate ID input, continue

## 2024-08-10 NOTE — PROGRESS NOTES
Spiritual Health Assessment/Progress Note  [facility]    Initial Encounter,  ,  ,      Name: Garrison Buenrostro MRN: [id number]    Age: 54 y.o.     Sex: female   Language: English   Bahai: Alevism   Intractable abdominal pain     Date: 8/10/2024            Total Time Calculated: 7 min              Spiritual Assessment began in [facility] MED[facility]        Referral/Consult From: Rounding   Encounter Overview/Reason: Initial Encounter  Service Provided For: Patient    Kaur, Belief, Meaning:   Patient identifies as spiritual  Family/Friends are connected with a kaur tradition or spiritual practice      Importance and Influence:  Patient has spiritual/personal beliefs that influence decisions regarding their health  Family/Friends no family/friends present    Community:  Patient feels well-supported. Support system includes: Children  Family/Friends Other: n/a    Assessment and Plan of Care:     Patient Interventions include: Affirmed coping skills/support systems and Engaged in life review and/or legacy  Family/Friends Interventions include: Other: n/a    Patient Plan of Care: No spiritual needs identified for follow-up  Family/Friends Plan of Care: Other: n/a    Electronically signed by Chaplain Jacobo on 8/10/2024 at 11:29 AM

## 2024-08-10 NOTE — PROGRESS NOTES
Patient complained of pain above IV site while receiving potassium chloride. Potassium infusion stopped and patient given a warm pack. Dr Saunders notified.

## 2024-08-10 NOTE — PROGRESS NOTES
conducted an initial consultation and Spiritual Assessment for Garrison Buenrostro, who is a 54 y.o.,female. Patient's Primary Language is: English.   According to the patient's EMR Temple Affiliation is: [employer].     The reason the Patient came to the hospital is:   Patient Active Problem List    Diagnosis Date Noted    Colitis due to Clostridioides difficile 08/09/2024    Intractable pain 08/07/2024    Intractable abdominal pain 08/06/2024    Sepsis without acute organ dysfunction (HCC) 06/03/2024    Pyelonephritis 06/03/2024    Flank pain 06/02/2024    Hypoxia 06/02/2024    Respiratory failure (HCC) 06/01/2024    Acute hypoxic on chronic hypercapnic respiratory failure (HCC) 06/01/2024    Polypharmacy 06/01/2024    Left upper quadrant abdominal pain 06/01/2024    Ureteral stent present 06/01/2024    Acute respiratory failure with hypoxia and hypercapnia (HCC) 06/01/2024    ARF (acute renal failure) (HCC) 06/01/2024    Single kidney 06/01/2024    Generalized abdominal pain 05/26/2024    Anxiety 05/26/2024    Moderate episode of recurrent major depressive disorder (HCC) 05/26/2024    Ureteral colic 05/24/2024    Left renal stone 01/12/2024    Pulmonary nodule seen on imaging study 01/12/2024    Lumbar radiculopathy 12/29/2023    Migraine headache 12/29/2023    Seizures (HCC) 12/29/2023        The  provided the following Interventions:  Initiated a relationship of care and support.   Explored issues of thierry, belief, spirituality and Anabaptist/ritual needs while hospitalized.  Listened empathically.  Provided chaplaincy education concerning Advance Medical Directive.  Provided information about Spiritual Care Services.  Offered prayer and assurance of continued prayers on patient's behalf.   Chart reviewed.    The following outcomes where achieved:  Patient shared limited information about both their medical narrative and spiritual journey/beliefs.   confirmed Patient's Temple

## 2024-08-11 LAB
ANION GAP SERPL CALC-SCNC: 6 MMOL/L (ref 3–18)
BASOPHILS # BLD: 0 K/UL (ref 0–0.1)
BASOPHILS NFR BLD: 0 % (ref 0–2)
BUN SERPL-MCNC: 6 MG/DL (ref 7–18)
BUN/CREAT SERPL: 11 (ref 12–20)
CALCIUM SERPL-MCNC: 8.8 MG/DL (ref 8.5–10.1)
CHLORIDE SERPL-SCNC: 108 MMOL/L (ref 100–111)
CO2 SERPL-SCNC: 28 MMOL/L (ref 21–32)
CREAT SERPL-MCNC: 0.57 MG/DL (ref 0.6–1.3)
DIFFERENTIAL METHOD BLD: ABNORMAL
EOSINOPHIL # BLD: 0.2 K/UL (ref 0–0.4)
EOSINOPHIL NFR BLD: 4 % (ref 0–5)
ERYTHROCYTE [DISTWIDTH] IN BLOOD BY AUTOMATED COUNT: 13.4 % (ref 11.6–14.5)
GLUCOSE SERPL-MCNC: 82 MG/DL (ref 74–99)
HCT VFR BLD AUTO: 34.3 % (ref 35–45)
HGB BLD-MCNC: 11.1 G/DL (ref 12–16)
IMM GRANULOCYTES # BLD AUTO: 0 K/UL (ref 0–0.04)
IMM GRANULOCYTES NFR BLD AUTO: 0 % (ref 0–0.5)
LYMPHOCYTES # BLD: 2.2 K/UL (ref 0.9–3.6)
LYMPHOCYTES NFR BLD: 45 % (ref 21–52)
MCH RBC QN AUTO: 28.8 PG (ref 24–34)
MCHC RBC AUTO-ENTMCNC: 32.4 G/DL (ref 31–37)
MCV RBC AUTO: 89.1 FL (ref 78–100)
MONOCYTES # BLD: 0.5 K/UL (ref 0.05–1.2)
MONOCYTES NFR BLD: 9 % (ref 3–10)
NEUTS SEG # BLD: 2 K/UL (ref 1.8–8)
NEUTS SEG NFR BLD: 41 % (ref 40–73)
NRBC # BLD: 0 K/UL (ref 0–0.01)
NRBC BLD-RTO: 0 PER 100 WBC
PLATELET # BLD AUTO: 246 K/UL (ref 135–420)
PMV BLD AUTO: 10.6 FL (ref 9.2–11.8)
POTASSIUM SERPL-SCNC: 3.1 MMOL/L (ref 3.5–5.5)
RBC # BLD AUTO: 3.85 M/UL (ref 4.2–5.3)
SODIUM SERPL-SCNC: 142 MMOL/L (ref 136–145)
WBC # BLD AUTO: 4.9 K/UL (ref 4.6–13.2)

## 2024-08-11 PROCEDURE — 6370000000 HC RX 637 (ALT 250 FOR IP): Performed by: INTERNAL MEDICINE

## 2024-08-11 PROCEDURE — 94761 N-INVAS EAR/PLS OXIMETRY MLT: CPT

## 2024-08-11 PROCEDURE — 6360000002 HC RX W HCPCS: Performed by: INTERNAL MEDICINE

## 2024-08-11 PROCEDURE — 2580000003 HC RX 258: Performed by: INTERNAL MEDICINE

## 2024-08-11 PROCEDURE — 85025 COMPLETE CBC W/AUTO DIFF WBC: CPT

## 2024-08-11 PROCEDURE — 99232 SBSQ HOSP IP/OBS MODERATE 35: CPT | Performed by: HOSPITALIST

## 2024-08-11 PROCEDURE — 36415 COLL VENOUS BLD VENIPUNCTURE: CPT

## 2024-08-11 PROCEDURE — 6370000000 HC RX 637 (ALT 250 FOR IP): Performed by: HOSPITALIST

## 2024-08-11 PROCEDURE — 80048 BASIC METABOLIC PNL TOTAL CA: CPT

## 2024-08-11 PROCEDURE — 1100000003 HC PRIVATE W/ TELEMETRY

## 2024-08-11 PROCEDURE — 6370000000 HC RX 637 (ALT 250 FOR IP): Performed by: PHYSICIAN ASSISTANT

## 2024-08-11 PROCEDURE — 6360000002 HC RX W HCPCS: Performed by: PHYSICIAN ASSISTANT

## 2024-08-11 RX ORDER — LEVETIRACETAM 500 MG/1
1000 TABLET ORAL 2 TIMES DAILY
Status: DISCONTINUED | OUTPATIENT
Start: 2024-08-11 | End: 2024-08-12 | Stop reason: HOSPADM

## 2024-08-11 RX ADMIN — VANCOMYCIN HYDROCHLORIDE 125 MG: 1 INJECTION, POWDER, LYOPHILIZED, FOR SOLUTION INTRAVENOUS at 06:25

## 2024-08-11 RX ADMIN — GABAPENTIN 800 MG: 400 CAPSULE ORAL at 20:59

## 2024-08-11 RX ADMIN — ALPRAZOLAM 1 MG: 0.25 TABLET ORAL at 17:31

## 2024-08-11 RX ADMIN — GABAPENTIN 800 MG: 400 CAPSULE ORAL at 12:15

## 2024-08-11 RX ADMIN — VANCOMYCIN HYDROCHLORIDE 125 MG: 1 INJECTION, POWDER, LYOPHILIZED, FOR SOLUTION INTRAVENOUS at 12:18

## 2024-08-11 RX ADMIN — QUETIAPINE FUMARATE 300 MG: 300 TABLET, EXTENDED RELEASE ORAL at 20:59

## 2024-08-11 RX ADMIN — GABAPENTIN 800 MG: 400 CAPSULE ORAL at 08:15

## 2024-08-11 RX ADMIN — Medication 1 CAPSULE: at 08:15

## 2024-08-11 RX ADMIN — OXYCODONE HYDROCHLORIDE 5 MG: 5 TABLET ORAL at 20:59

## 2024-08-11 RX ADMIN — VANCOMYCIN HYDROCHLORIDE 125 MG: 1 INJECTION, POWDER, LYOPHILIZED, FOR SOLUTION INTRAVENOUS at 23:50

## 2024-08-11 RX ADMIN — ALPRAZOLAM 1 MG: 0.25 TABLET ORAL at 20:59

## 2024-08-11 RX ADMIN — VANCOMYCIN HYDROCHLORIDE 125 MG: 1 INJECTION, POWDER, LYOPHILIZED, FOR SOLUTION INTRAVENOUS at 17:32

## 2024-08-11 RX ADMIN — SERTRALINE 100 MG: 50 TABLET, FILM COATED ORAL at 08:15

## 2024-08-11 RX ADMIN — LEVETIRACETAM 1000 MG: 500 TABLET, FILM COATED ORAL at 12:15

## 2024-08-11 RX ADMIN — TAMSULOSIN HYDROCHLORIDE 0.4 MG: 0.4 CAPSULE ORAL at 08:15

## 2024-08-11 RX ADMIN — ONDANSETRON 4 MG: 4 TABLET, ORALLY DISINTEGRATING ORAL at 20:59

## 2024-08-11 RX ADMIN — ENOXAPARIN SODIUM 40 MG: 100 INJECTION SUBCUTANEOUS at 08:15

## 2024-08-11 RX ADMIN — LEVETIRACETAM 1000 MG: 500 TABLET, FILM COATED ORAL at 20:59

## 2024-08-11 RX ADMIN — ALPRAZOLAM 1 MG: 0.25 TABLET ORAL at 12:15

## 2024-08-11 RX ADMIN — ALPRAZOLAM 1 MG: 0.25 TABLET ORAL at 09:26

## 2024-08-11 RX ADMIN — OXYCODONE HYDROCHLORIDE 5 MG: 5 TABLET ORAL at 08:15

## 2024-08-11 ASSESSMENT — PAIN DESCRIPTION - ONSET: ONSET: GRADUAL

## 2024-08-11 ASSESSMENT — PAIN DESCRIPTION - PAIN TYPE: TYPE: ACUTE PAIN

## 2024-08-11 ASSESSMENT — PAIN DESCRIPTION - ORIENTATION: ORIENTATION: LEFT

## 2024-08-11 ASSESSMENT — PAIN SCALES - GENERAL
PAINLEVEL_OUTOF10: 8
PAINLEVEL_OUTOF10: 0

## 2024-08-11 ASSESSMENT — PAIN - FUNCTIONAL ASSESSMENT: PAIN_FUNCTIONAL_ASSESSMENT: ACTIVITIES ARE NOT PREVENTED

## 2024-08-11 ASSESSMENT — PAIN DESCRIPTION - FREQUENCY: FREQUENCY: INTERMITTENT

## 2024-08-11 ASSESSMENT — PAIN DESCRIPTION - LOCATION: LOCATION: ABDOMEN;FLANK

## 2024-08-11 ASSESSMENT — PAIN DESCRIPTION - DESCRIPTORS: DESCRIPTORS: ACHING

## 2024-08-11 NOTE — PROGRESS NOTES
[facility] Hospitalist [facility]  Progress Note    Patient: Garrison Buenrostro Age: 54 y.o. : 1970 MR#: [id number] SSN: [id number]  Date/Time: 2024     Subjective:     Patient seen evaluated, lying in bed, no acute distress.  54-year-old female presents to the emergency room with complaints of abdominal pain, nausea and vomiting.  CT abdomen pelvis consistent with mild epiploic appendagitis.  Patient has a history of right nephrectomy, recent left ureteral stent removal by urology.  Renal ultrasound  shows normal left kidney with 2 small nonobstructing stones, no evidence of hydro.  General surgery consulted    -patient seen evaluated, lying in bed, no acute distress.  Patient mentions she feels slightly better, abdominal pain has decreased.  She is currently tolerating water.  Will continue clear liquid diet.  Patient and family requesting urology consultation.  Appreciate general surgery and GI consultation.  Anticipate discharge home in a.m. once patient is tolerating a diet.     -patient seen evaluated, lying in bed, no acute distress.  Abdominal pain is better, continue full liquid diet.  Appreciate urology input.  Patient tested positive for C. difficile, ID consulted.  Continue to follow.  Anticipate discharge home in 1 to 2 days.    8/10-patient seen evaluated, lying in bed, no acute distress.  Abdominal pain is improved.  Patient is currently tolerating a diet.  C. difficile positive, patient is on p.o. vancomycin to be discharged home on p.o. vancomycin however difficult to find at this time, have asked patient to discharge to pharmacy.  If able to arrange, patient will be discharged home today versus tomorrow, continue to follow.     -patient seen evaluated, lying in bed, no acute distress.  Patient feels better.  She continues to have diarrhea but it is improving.  Continue p.o. vancomycin.  Patient reached out to her pharmacy at [facility] and vancomycin is  removal of the left ureteral stent. Electronically signed by Ever Nobles       Labs:    Recent Results (from the past 48 hour(s))   CBC with Auto Differential    Collection Time: 08/10/24  1:05 AM   Result Value Ref Range    WBC 5.0 4.6 - 13.2 K/uL    RBC 3.86 (L) 4.20 - 5.30 M/uL    Hemoglobin 11.1 (L) 12.0 - 16.0 g/dL    Hematocrit 35.1 35.0 - 45.0 %    MCV 90.9 78.0 - 100.0 FL    MCH 28.8 24.0 - 34.0 PG    MCHC 31.6 31.0 - 37.0 g/dL    RDW 13.3 11.6 - 14.5 %    Platelets 272 135 - 420 K/uL    MPV 10.5 9.2 - 11.8 FL    Nucleated RBCs 0.0 0  WBC    nRBC 0.00 0.00 - 0.01 K/uL    Neutrophils % 47 40 - 73 %    Lymphocytes % 43 21 - 52 %    Monocytes % 8 3 - 10 %    Eosinophils % 2 0 - 5 %    Basophils % 1 0 - 2 %    Immature Granulocytes % 0 0.0 - 0.5 %    Neutrophils Absolute 2.3 1.8 - 8.0 K/UL    Lymphocytes Absolute 2.2 0.9 - 3.6 K/UL    Monocytes Absolute 0.4 0.05 - 1.2 K/UL    Eosinophils Absolute 0.1 0.0 - 0.4 K/UL    Basophils Absolute 0.0 0.0 - 0.1 K/UL    Immature Granulocytes Absolute 0.0 0.00 - 0.04 K/UL    Differential Type AUTOMATED     Basic Metabolic Panel    Collection Time: 08/10/24  9:24 AM   Result Value Ref Range    Sodium 141 136 - 145 mmol/L    Potassium 3.0 (L) 3.5 - 5.5 mmol/L    Chloride 107 100 - 111 mmol/L    CO2 23 21 - 32 mmol/L    Anion Gap 11 3.0 - 18 mmol/L    Glucose 83 74 - 99 mg/dL    BUN 6 (L) 7.0 - 18 MG/DL    Creatinine 0.69 0.6 - 1.3 MG/DL    BUN/Creatinine Ratio 9 (L) 12 - 20      Est, Glom Filt Rate >90 >60 ml/min/1.73m2    Calcium 8.9 8.5 - 10.1 MG/DL   Magnesium    Collection Time: 08/10/24  9:24 AM   Result Value Ref Range    Magnesium 1.7 1.6 - 2.6 mg/dL   CBC with Auto Differential    Collection Time: 08/11/24  4:47 AM   Result Value Ref Range    WBC 4.9 4.6 - 13.2 K/uL    RBC 3.85 (L) 4.20 - 5.30 M/uL    Hemoglobin 11.1 (L) 12.0 - 16.0 g/dL    Hematocrit 34.3 (L) 35.0 - 45.0 %    MCV 89.1 78.0 - 100.0 FL    MCH 28.8 24.0 - 34.0 PG    MCHC 32.4 31.0 - 37.0 g/dL

## 2024-08-11 NOTE — PROGRESS NOTES
Pt's daughter called and stated that she need the brand name of the medicine vancomycin because it would not go through the insurance. Informed her that I will speak with the MD. The prescription was written for vancomycin or substitution per  Dr. Saunders. Called pt's daughter back multiple times an the line is busy.

## 2024-08-11 NOTE — FLOWSHEET NOTE
08/11/24 0815   Pain Assessment   Pain Assessment 0-10   Pain Level 8   Patient's Stated Pain Goal 0 - No pain   Pain Location Abdomen;Flank   Pain Orientation Left   Pain Descriptors Aching   Functional Pain Assessment Activities are not prevented   Pain Type Acute pain   Pain Radiating Towards n/a   Pain Frequency Intermittent   Pain Onset Gradual   Non-Pharmaceutical Pain Intervention(s) Emotional support     Medicated with PRN pain med. Pt remains alert.

## 2024-08-11 NOTE — PROGRESS NOTES
Xanax unavailable. Called pharmacy to inform them that there are only 2 tabs an the pt would need 4 to equal the total mg ordered by MD. Pt updated. Spoke to Krista in pharmacy and she will send the tech soon to load the medication.

## 2024-08-11 NOTE — PROGRESS NOTES
Perfect serve sent to MD Sahni will not be able to get the oral Vanc from [facility] until tomorrow.  Pt Kayli was also changed to RYAN.

## 2024-08-11 NOTE — PROGRESS NOTES
Attempted to place an IV x2. Unsuccessful an pt stated it hurt to bad to continue. Pt stated she is a hard stick.

## 2024-08-11 NOTE — PROGRESS NOTES
2208-IV Keppra not given at this time, immediately once medication was beginning to be given, patient stated it hurt. Medication/Flush has apprehension and tension with flushing, concern for inflitration. Perfect served provider to make aware and request medication be changed to PO as patient tolerating PO meds at this time with potential discharge home tomorrow. Will await provider decision.

## 2024-08-12 VITALS
SYSTOLIC BLOOD PRESSURE: 138 MMHG | HEIGHT: 64 IN | RESPIRATION RATE: 18 BRPM | WEIGHT: 200 LBS | BODY MASS INDEX: 34.15 KG/M2 | OXYGEN SATURATION: 93 % | DIASTOLIC BLOOD PRESSURE: 94 MMHG | HEART RATE: 115 BPM | TEMPERATURE: 97.5 F

## 2024-08-12 PROCEDURE — 6370000000 HC RX 637 (ALT 250 FOR IP): Performed by: HOSPITALIST

## 2024-08-12 PROCEDURE — 99239 HOSP IP/OBS DSCHRG MGMT >30: CPT | Performed by: HOSPITALIST

## 2024-08-12 PROCEDURE — 6360000002 HC RX W HCPCS: Performed by: INTERNAL MEDICINE

## 2024-08-12 PROCEDURE — 6360000002 HC RX W HCPCS: Performed by: PHYSICIAN ASSISTANT

## 2024-08-12 PROCEDURE — 6370000000 HC RX 637 (ALT 250 FOR IP): Performed by: PHYSICIAN ASSISTANT

## 2024-08-12 PROCEDURE — 2580000003 HC RX 258: Performed by: INTERNAL MEDICINE

## 2024-08-12 PROCEDURE — 6370000000 HC RX 637 (ALT 250 FOR IP): Performed by: INTERNAL MEDICINE

## 2024-08-12 RX ORDER — LACTOBACILLUS RHAMNOSUS GG 10B CELL
1 CAPSULE ORAL
Qty: 15 CAPSULE | Refills: 0 | Status: SHIPPED | OUTPATIENT
Start: 2024-08-13

## 2024-08-12 RX ADMIN — TAMSULOSIN HYDROCHLORIDE 0.4 MG: 0.4 CAPSULE ORAL at 08:53

## 2024-08-12 RX ADMIN — GABAPENTIN 800 MG: 400 CAPSULE ORAL at 13:10

## 2024-08-12 RX ADMIN — SERTRALINE 100 MG: 50 TABLET, FILM COATED ORAL at 08:51

## 2024-08-12 RX ADMIN — ALPRAZOLAM 1 MG: 0.25 TABLET ORAL at 08:51

## 2024-08-12 RX ADMIN — POTASSIUM CHLORIDE 40 MEQ: 1500 TABLET, EXTENDED RELEASE ORAL at 05:49

## 2024-08-12 RX ADMIN — Medication 1 CAPSULE: at 08:53

## 2024-08-12 RX ADMIN — VANCOMYCIN HYDROCHLORIDE 125 MG: 1 INJECTION, POWDER, LYOPHILIZED, FOR SOLUTION INTRAVENOUS at 05:49

## 2024-08-12 RX ADMIN — ALPRAZOLAM 1 MG: 0.25 TABLET ORAL at 13:10

## 2024-08-12 RX ADMIN — GABAPENTIN 800 MG: 400 CAPSULE ORAL at 08:50

## 2024-08-12 RX ADMIN — LEVETIRACETAM 1000 MG: 500 TABLET, FILM COATED ORAL at 08:52

## 2024-08-12 RX ADMIN — ENOXAPARIN SODIUM 40 MG: 100 INJECTION SUBCUTANEOUS at 08:53

## 2024-08-12 RX ADMIN — VANCOMYCIN HYDROCHLORIDE 125 MG: 1 INJECTION, POWDER, LYOPHILIZED, FOR SOLUTION INTRAVENOUS at 13:11

## 2024-08-12 ASSESSMENT — PAIN SCALES - GENERAL
PAINLEVEL_OUTOF10: 0

## 2024-08-12 NOTE — PROGRESS NOTES
Patient feels much better.  Will send oral vancomycin prescription to [facility] outpatient pharmacy to fill.  Once medication as well, anticipate discharge home later today.

## 2024-08-12 NOTE — DISCHARGE INSTRUCTIONS
Discussed with the patient and all questioned fully answered. She will call me if any problems arise.    DISCHARGE SUMMARY from Nurse    PATIENT INSTRUCTIONS:      What to do at Home:  Recommended activity: activity as tolerated,     If you experience any of the following symptoms like sudden confusion, severe headache, please follow up with emergency department or primary MD.    *  Please give a list of your current medications to your Primary Care Provider.    *  Please update this list whenever your medications are discontinued, doses are      changed, or new medications (including over-the-counter products) are added.    *  Please carry medication information at all times in case of emergency situations.    These are general instructions for a healthy lifestyle:    No smoking/ No tobacco products/ Avoid exposure to second hand smoke  Surgeon General's Warning:  Quitting smoking now greatly reduces serious risk to your health.    Obesity, smoking, and sedentary lifestyle greatly increases your risk for illness    A healthy diet, regular physical exercise & weight monitoring are important for maintaining a healthy lifestyle    You may be retaining fluid if you have a history of heart failure or if you experience any of the following symptoms:  Weight gain of 3 pounds or more overnight or 5 pounds in a week, increased swelling in our hands or feet or shortness of breath while lying flat in bed.  Please call your doctor as soon as you notice any of these symptoms; do not wait until your next office visit.        The discharge information has been reviewed with the patient.  The patient verbalized understanding.  Discharge medications reviewed with the patient and appropriate educational materials and side effects teaching were provided.  ___________________________________________________________________________________________________________________________________

## 2024-08-12 NOTE — PROGRESS NOTES
Physician Progress Note      PATIENT:               AJ ESPINAL  [facility] #:                  [id number]  :                       1970  ADMIT DATE:       2024 8:42 AM  DISCH DATE:  RESPONDING  PROVIDER #:        Anthony Saunders MD          QUERY TEXT:    Patient admitted with mild epiploic appendagitis. Please document in progress   notes and discharge summary if you are evaluating and/or treating any of the   following:    Risk Factors: Epiploic appendagitis, Colitis C. difficile positive  Clinical Indicators: Internal Medicine PN -Mild epiploic appendagitis-treat   symptomatically  CT abdomen on -PERITONEUM:  No free fluid or air.  H&P on -Mild sigmoid epiploic appendagitis . Prior right nephrectomy.   Nonobstructing stones within the left kidney. Interval removal of the left   ureteral stent.  Treatment: IV ondansetron (ZOFRAN) injection 4 mg, vancomycin (VANCOCIN) 50   mg/mL oral solution 125 mg      Thank you,  Lynn Herman, S, CDS  Options provided:  -- Mild Epiploic Appendagitis with Peritonitis  -- Mild Epiploic Appendagitis without Peritonitis  -- Other - I will add my own diagnosis  -- Disagree - Not applicable / Not valid  -- Disagree - Clinically unable to determine / Unknown  -- Refer to Clinical Documentation Reviewer    PROVIDER RESPONSE TEXT:    This patient is mild Epiploic Appendagitis without Peritonitis.    Query created by: Lynn Herman on 2024 4:38 AM      Electronically signed by:  Anthony Saunders MD 2024 7:47 AM

## 2024-08-12 NOTE — DISCHARGE SUMMARY
Hospitalist Discharge Summary      Patient: Garrison Buenrostro MRN: [id number]  [facility]: [id number]    YOB: 1970  Age: 54 y.o.  Sex: female    DOA: 8/6/2024 LOS:  LOS: 6 days   Discharge Date:     Admission Diagnoses: Hypokalemia [E87.6]  Ketonuria [R82.4]  Intractable pain [R52]  Intractable abdominal pain [R10.9]  Epiploic appendagitis due to ischemia (HCC) [K52.9, K55.9]    Discharge Diagnoses:    C. difficile colitis  Mild epiploic appendagitis  History of seizure disorder  History of PTSD  Obesity    Discharge Condition: Fair    Discharge To: Home    CODE STATUS: Full Code         PHYSICAL EXAM  Visit Vitals  /76   Pulse 90   Temp 98.1 °F (36.7 °C) (Oral)   Resp 18   Ht 1.626 m (5' 4\")   Wt 90.7 kg (200 lb)   SpO2 95%   BMI 34.33 kg/m²       General: Alert, cooperative, no acute distress    Lungs:  CTA Bilaterally. No Wheezing/Rhonchi/Rales.  Heart:  Regular rate and Rhythm.  Abdomen: Soft, Non distended, Non tender. + Bowel sounds.  Extremities: No edema/ cyanosis/ clubbing  Neurologic:  AA oriented X 3. Moves all extremities.                                HPI:    Garrison Buenrostro is a 53 y.o. female with a PMHx of epilepsy, PTSD, sciatica, right nephrectomy who presented to the ED with c/o left sided abdominal and flank pain ongoing for 5 days. Pain has been worsening and associated with NVD. She describes the pain as stabbing and constant 10/10. Dilaudid improved the pain to 8/10. She denies sob, cough, cp, fever, chills. She states the pain is similar to prior kidney stones, but much worse. She was seen in the ED yesterday and discharged home. She states she wasn't able to  her medications from the pharmacy so she continued to feel bad today which brought her back to the ED.      In the ED, she was hypertensive to 182/108. HR low 100s improved with IV fluids. Labs with K 3.1, lactic 0.83, WBC 6.5, UA with 80 ketones. CTAP on 8/5 revealed mild sigmoid epiploic appendagitis and  and iterative reconstruction techniques.  The specific techniques used on this CT exam have been documented in the patient's electronic medical record.  Digital Imaging and Communications in Medicine (DICOM) format image data are available to nonaffiliated external healthcare facilities or entities on a secure, media free, reciprocally searchable basis with patient authorization for at least a 12-month period after this study. _______________ FINDINGS: LOWER CHEST: Small plaque-like nodule along the minor fissure (axial 3), benign-appearing. The visualized lungs otherwise clear. LIVER, BILIARY: Liver appears normal.   No biliary dilatation.    No hyperdense gallstones.  PANCREAS: Normal appearing.  SPLEEN: Normal appearing.  ADRENALS: Normal appearing. KIDNEYS/URETERS/BLADDER: The right kidney is surgically absent. There is no soft tissue in the lumpectomy bed. There is a 3 mm nonobstructing stone, upper pole left kidney and a 2 mm stone at the lower pole of the left kidney. There is no hydronephrosis or perinephric inflammation. No hydroureter. The left ureteral stent has been removed. The bladder has a normal unopacified appearance. PELVIC ORGANS: No free fluid, adenopathy or mass.  LYMPH NODES: No adenopathy.  GASTROINTESTINAL TRACT: A few scattered colonic diverticuli are present. No bowel dilatation or wall thickening.   The appendix is surgically absent. There is mild extraluminal stranding abutting a short segment of the sigmoid colon within the left lower quadrant (as on axial 127). There is central fat density in this area of stranding. PERITONEUM:  No free fluid or air.  VASCULATURE: Non-aneurysmal.  BONES: Age appropriate degenerative changes.  No acute osseous findings.  No lytic or blastic lesions of concern.  SOFT TISSUES:  Unremarkable. OTHER: _______________     Mild sigmoid epiploic appendagitis . Prior right nephrectomy. Nonobstructing stones within the left kidney. Interval removal of the left

## 2024-08-12 NOTE — PLAN OF CARE
Problem: Discharge Planning  Goal: Discharge to home or other facility with appropriate resources  8/11/2024 0002 by Lexy Bear RN  Outcome: Progressing  8/10/2024 1846 by Stefania Zee RN  Outcome: Progressing     Problem: Pain  Goal: Verbalizes/displays adequate comfort level or baseline comfort level  8/11/2024 0201 by Eelna San RN  Outcome: Progressing  8/11/2024 0002 by Lexy Bear RN  Outcome: Progressing  8/10/2024 1846 by Stefania Zee RN  Outcome: Progressing     Problem: ABCDS Injury Assessment  Goal: Absence of physical injury  8/11/2024 0201 by Elena San RN  Outcome: Progressing  8/11/2024 0002 by Lexy Bear RN  Outcome: Progressing  8/10/2024 1846 by Stefania Zee RN  Outcome: Progressing     Problem: Safety - Adult  Goal: Free from fall injury  8/11/2024 0201 by Elena San RN  Outcome: Progressing  8/11/2024 0002 by Lexy Bear RN  Outcome: Progressing  8/10/2024 1846 by Stefania Zee RN  Outcome: Progressing     Problem: Gastrointestinal - Adult  Goal: Minimal or absence of nausea and vomiting  8/11/2024 0201 by Elena San RN  Outcome: Progressing  8/11/2024 0002 by Lexy Bear RN  Outcome: Progressing  8/10/2024 1846 by Stefania Zee RN  Outcome: Progressing  Goal: Maintains or returns to baseline bowel function  8/11/2024 0201 by Elena San RN  Outcome: Progressing  8/11/2024 0002 by Lexy Bear RN  Outcome: Progressing  8/10/2024 1846 by Stefania Zee RN  Outcome: Progressing  Goal: Maintains adequate nutritional intake  8/11/2024 0201 by Elena San RN  Outcome: Progressing  8/11/2024 0002 by Lexy Bear RN  Outcome: Progressing  8/10/2024 1846 by Stefania Zee RN  Outcome: Progressing  Goal: Establish and maintain optimal ostomy function  8/11/2024 0002 by Lexy Bear RN  Outcome: Progressing  8/10/2024 1846 by Stefania Zee, RN  Outcome: Progressing     Problem: Genitourinary - Adult  Goal: Absence of urinary 
  Problem: Discharge Planning  Goal: Discharge to home or other facility with appropriate resources  8/11/2024 0002 by Lexy Bear RN  Outcome: Progressing  8/10/2024 1846 by Stefania Zee RN  Outcome: Progressing     Problem: Pain  Goal: Verbalizes/displays adequate comfort level or baseline comfort level  8/11/2024 0734 by Elana Bear RN  Outcome: Progressing  8/11/2024 0201 by Elena San RN  Outcome: Progressing  8/11/2024 0002 by Lexy Bear RN  Outcome: Progressing  8/10/2024 1846 by Stefania Zee RN  Outcome: Progressing     Problem: ABCDS Injury Assessment  Goal: Absence of physical injury  8/11/2024 0734 by Elana Bear RN  Outcome: Progressing  8/11/2024 0201 by Elena San RN  Outcome: Progressing  8/11/2024 0002 by Lexy Bear RN  Outcome: Progressing  8/10/2024 1846 by Stefania Zee RN  Outcome: Progressing     Problem: Safety - Adult  Goal: Free from fall injury  8/11/2024 0734 by Elana Bear RN  Outcome: Progressing  8/11/2024 0201 by Elena San RN  Outcome: Progressing  8/11/2024 0002 by Lexy Bear RN  Outcome: Progressing  8/10/2024 1846 by Stefania Zee RN  Outcome: Progressing     Problem: Gastrointestinal - Adult  Goal: Minimal or absence of nausea and vomiting  8/11/2024 0734 by Elana Bear RN  Outcome: Progressing  8/11/2024 0201 by Elena San RN  Outcome: Progressing  8/11/2024 0002 by Lexy Bear RN  Outcome: Progressing  8/10/2024 1846 by Stefania Zee RN  Outcome: Progressing  Goal: Maintains or returns to baseline bowel function  8/11/2024 0201 by Elena San RN  Outcome: Progressing  8/11/2024 0002 by Lexy Bear RN  Outcome: Progressing  8/10/2024 1846 by Stefania Zee RN  Outcome: Progressing  Goal: Maintains adequate nutritional intake  8/11/2024 0201 by Elena San RN  Outcome: Progressing  8/11/2024 0002 by Lexy Bear RN  Outcome: Progressing  8/10/2024 1846 by Stefania Zee RN  Outcome: Progressing  Goal: 
  Problem: Discharge Planning  Goal: Discharge to home or other facility with appropriate resources  8/7/2024 1036 by Kait Whalen RN  Outcome: Progressing  8/7/2024 0220 by Felicita Sinha RN  Outcome: Progressing     Problem: Pain  Goal: Verbalizes/displays adequate comfort level or baseline comfort level  8/7/2024 1036 by Kait Whalen RN  Outcome: Progressing  8/7/2024 0220 by Felicita Sinha RN  Outcome: Progressing     Problem: ABCDS Injury Assessment  Goal: Absence of physical injury  Outcome: Progressing     Problem: Safety - Adult  Goal: Free from fall injury  Outcome: Progressing     
  Problem: Discharge Planning  Goal: Discharge to home or other facility with appropriate resources  8/8/2024 0009 by Felicita Sinha RN  Outcome: Progressing  8/7/2024 1036 by Kait Whalen RN  Outcome: Progressing     Problem: Pain  Goal: Verbalizes/displays adequate comfort level or baseline comfort level  8/8/2024 0009 by Felicita Sinha RN  Outcome: Progressing  8/7/2024 1036 by Kait Whalen RN  Outcome: Progressing     Problem: ABCDS Injury Assessment  Goal: Absence of physical injury  8/8/2024 0009 by Felicita Sinha RN  Outcome: Progressing  8/7/2024 1036 by Kait Whalen RN  Outcome: Progressing     Problem: Safety - Adult  Goal: Free from fall injury  8/8/2024 0009 by Felicita Sinha RN  Outcome: Progressing  8/7/2024 1036 by Kait Whalen RN  Outcome: Progressing     
  Problem: Discharge Planning  Goal: Discharge to home or other facility with appropriate resources  8/9/2024 0018 by Kanika Cancino RN  Outcome: Progressing  Flowsheets (Taken 8/8/2024 2000)  Discharge to home or other facility with appropriate resources:   Identify barriers to discharge with patient and caregiver   Arrange for needed discharge resources and transportation as appropriate  8/8/2024 1723 by Dorota Allen RN  Outcome: Progressing  Flowsheets (Taken 8/8/2024 0800)  Discharge to home or other facility with appropriate resources: Identify barriers to discharge with patient and caregiver     Problem: Pain  Goal: Verbalizes/displays adequate comfort level or baseline comfort level  8/9/2024 0018 by Kanika Cancino RN  Outcome: Progressing  Flowsheets (Taken 8/8/2024 1957)  Verbalizes/displays adequate comfort level or baseline comfort level:   Encourage patient to monitor pain and request assistance   Assess pain using appropriate pain scale   Administer analgesics based on type and severity of pain and evaluate response  8/8/2024 1723 by Dorota Allen RN  Outcome: Progressing     Problem: ABCDS Injury Assessment  Goal: Absence of physical injury  8/9/2024 0018 by Kanika Cancino RN  Outcome: Progressing  Flowsheets (Taken 8/8/2024 2000)  Absence of Physical Injury: Implement safety measures based on patient assessment  8/8/2024 1723 by Dorota Allen RN  Outcome: Progressing     Problem: Safety - Adult  Goal: Free from fall injury  Outcome: Progressing  Flowsheets (Taken 8/8/2024 2000)  Free From Fall Injury: Instruct family/caregiver on patient safety     Problem: Gastrointestinal - Adult  Goal: Minimal or absence of nausea and vomiting  Outcome: Progressing  Flowsheets (Taken 8/8/2024 2000)  Minimal or absence of nausea and vomiting: Administer IV fluids as ordered to ensure adequate hydration  Goal: Maintains or returns to baseline bowel function  Outcome: Progressing  Goal: 
  Problem: Discharge Planning  Goal: Discharge to home or other facility with appropriate resources  Outcome: Progressing     Problem: Pain  Goal: Verbalizes/displays adequate comfort level or baseline comfort level  8/12/2024 1120 by Ever Angel RN  Outcome: Progressing  8/11/2024 2240 by Nicolette Espino RN  Outcome: Progressing     Problem: ABCDS Injury Assessment  Goal: Absence of physical injury  8/12/2024 1120 by Ever Angel RN  Outcome: Progressing  8/11/2024 2240 by Nicolette Espino RN  Outcome: Progressing     Problem: Safety - Adult  Goal: Free from fall injury  8/12/2024 1120 by Ever Angel RN  Outcome: Progressing  8/11/2024 2240 by Nicolette Espino RN  Outcome: Progressing     Problem: Gastrointestinal - Adult  Goal: Minimal or absence of nausea and vomiting  8/12/2024 1120 by Ever Angel RN  Outcome: Progressing  8/11/2024 2240 by Nicolette Espino RN  Outcome: Progressing  Goal: Maintains or returns to baseline bowel function  Outcome: Progressing  Goal: Maintains adequate nutritional intake  Outcome: Progressing  Goal: Establish and maintain optimal ostomy function  Outcome: Progressing     Problem: Genitourinary - Adult  Goal: Absence of urinary retention  Outcome: Progressing  Goal: Urinary catheter remains patent  Outcome: Progressing     Problem: Metabolic/Fluid and Electrolytes - Adult  Goal: Electrolytes maintained within normal limits  Outcome: Progressing  Goal: Hemodynamic stability and optimal renal function maintained  Outcome: Progressing  Goal: Glucose maintained within prescribed range  Outcome: Progressing     Problem: Hematologic - Adult  Goal: Maintains hematologic stability  Outcome: Progressing     
  Problem: Discharge Planning  Goal: Discharge to home or other facility with appropriate resources  Outcome: Progressing     Problem: Pain  Goal: Verbalizes/displays adequate comfort level or baseline comfort level  Outcome: Progressing     
  Problem: Discharge Planning  Goal: Discharge to home or other facility with appropriate resources  Outcome: Progressing     Problem: Pain  Goal: Verbalizes/displays adequate comfort level or baseline comfort level  Outcome: Progressing     Problem: ABCDS Injury Assessment  Goal: Absence of physical injury  Outcome: Progressing     Problem: Safety - Adult  Goal: Free from fall injury  Outcome: Progressing     Problem: Gastrointestinal - Adult  Goal: Minimal or absence of nausea and vomiting  Outcome: Progressing  Goal: Maintains or returns to baseline bowel function  Outcome: Progressing  Goal: Maintains adequate nutritional intake  Outcome: Progressing  Goal: Establish and maintain optimal ostomy function  Outcome: Progressing     Problem: Genitourinary - Adult  Goal: Absence of urinary retention  Outcome: Progressing  Goal: Urinary catheter remains patent  Outcome: Progressing     Problem: Metabolic/Fluid and Electrolytes - Adult  Goal: Electrolytes maintained within normal limits  Outcome: Progressing  Goal: Hemodynamic stability and optimal renal function maintained  Outcome: Progressing  Goal: Glucose maintained within prescribed range  Outcome: Progressing     Problem: Hematologic - Adult  Goal: Maintains hematologic stability  Outcome: Progressing     
  Problem: Discharge Planning  Goal: Discharge to home or other facility with appropriate resources  Outcome: Progressing  Flowsheets (Taken 8/8/2024 0800)  Discharge to home or other facility with appropriate resources: Identify barriers to discharge with patient and caregiver     Problem: Pain  Goal: Verbalizes/displays adequate comfort level or baseline comfort level  Outcome: Progressing     Problem: ABCDS Injury Assessment  Goal: Absence of physical injury  Outcome: Progressing     
  Problem: Discharge Planning  Goal: Discharge to home or other facility with appropriate resources  Outcome: Progressing  Flowsheets (Taken 8/9/2024 0800)  Discharge to home or other facility with appropriate resources: Identify barriers to discharge with patient and caregiver     Problem: Pain  Goal: Verbalizes/displays adequate comfort level or baseline comfort level  Outcome: Not Progressing     Problem: Metabolic/Fluid and Electrolytes - Adult  Goal: Electrolytes maintained within normal limits  Outcome: Progressing     Problem: Pain  Goal: Verbalizes/displays adequate comfort level or baseline comfort level  Outcome: Not Progressing     
  Problem: Pain  Goal: Verbalizes/displays adequate comfort level or baseline comfort level  8/9/2024 2140 by Effie Joy RN  Outcome: Not Progressing  8/9/2024 1830 by Dorota Allen RN  Outcome: Not Progressing     Problem: ABCDS Injury Assessment  Goal: Absence of physical injury  Outcome: Progressing     Problem: Safety - Adult  Goal: Free from fall injury  Outcome: Progressing     Problem: Gastrointestinal - Adult  Goal: Minimal or absence of nausea and vomiting  Outcome: Progressing     Problem: Pain  Goal: Verbalizes/displays adequate comfort level or baseline comfort level  8/9/2024 2140 by Effie Joy, RN  Outcome: Not Progressing  8/9/2024 1830 by Dorota Allen RN  Outcome: Not Progressing     
  Problem: Pain  Goal: Verbalizes/displays adequate comfort level or baseline comfort level  Outcome: Progressing     Problem: ABCDS Injury Assessment  Goal: Absence of physical injury  Outcome: Progressing     Problem: Safety - Adult  Goal: Free from fall injury  Outcome: Progressing     Problem: Gastrointestinal - Adult  Goal: Minimal or absence of nausea and vomiting  Outcome: Progressing     
Progressing  8/10/2024 1846 by Stefania Zee RN  Outcome: Progressing     Problem: Metabolic/Fluid and Electrolytes - Adult  Goal: Electrolytes maintained within normal limits  8/11/2024 0002 by Lexy Bear RN  Outcome: Progressing  8/10/2024 1846 by Stefania Zee RN  Outcome: Progressing     Problem: Metabolic/Fluid and Electrolytes - Adult  Goal: Hemodynamic stability and optimal renal function maintained  8/11/2024 0002 by Lexy Bear RN  Outcome: Progressing     Problem: Metabolic/Fluid and Electrolytes - Adult  Goal: Glucose maintained within prescribed range  8/11/2024 0002 by Lexy Bear RN  Outcome: Progressing     Problem: Hematologic - Adult  Goal: Maintains hematologic stability  8/11/2024 0002 by Lexy Bear RN  Outcome: Progressing     
Unknown

## 2024-08-12 NOTE — PROGRESS NOTES
Infectious Disease progress Note        Reason:c.difficile colitis    Current abx Prior abx   Oral vancomycin since 8/9/2024      Lines:       Assessment :   54 y.o. female with a PMHx of epilepsy, PTSD, sciatica, right nephrectomy who presented to the ED with c/o left sided abdominal and flank pain ongoing for 5 days.    Clinical presentation consistent with C. difficile colitis    Patient seems to be responding to current treatment.  Improved abdominal pain/diarrhea  Resolved abdominal tenderness    Recommendations:    Recommend oral vancomycin 125 mg oral every 6 hours till 8/19/2024  Continue probiotics  Okay to discharge patient once outpatient oral vancomycin arranged    Above plan was discussed in details with patient,  and dr Saunders. Please call me if any further questions or concerns. Will continue to participate in the care of this patient.  HPI:    Feels much better.  Improved abdominal pain.  Resolved diarrhea.  Tolerating p.o. food  Past Medical History:   Diagnosis Date    PTSD (post-traumatic stress disorder)     Sciatica     Seizures (HCC)        Past Surgical History:   Procedure Laterality Date    APPENDECTOMY      CYSTOSCOPY Left 5/25/2024    CYSTOSCOPY ; LEFT RETROGRADE PYELOGRAM; LEFT DOUBLE J URETERAL STENT INSERTION performed by Junior Ochoa MD at [facility] MAIN OR    CYSTOSCOPY Left 6/1/2024    CYSTOSCOPY LEFT RETROGRADE PYELOGRAM ,  LEFT DOUBLE J STENT EXCHANGE performed by Alessio Agrawal MD at [facility] MAIN OR    KIDNEY REMOVAL Right     2014    LEG SURGERY Left     URETER SURGERY Left 7/8/2024    Cystoscopy, left ureteroscopy, laser lithotripsy, stent exchange performed by Wood Julian MD at [facility] MAIN OR       [unfilled]    Current Facility-Administered Medications   Medication Dose Route Frequency    levETIRAcetam (KEPPRA) tablet 1,000 mg  1,000 mg Oral BID    LORazepam (ATIVAN) tablet 0.5 mg  0.5 mg Oral Once    ondansetron (ZOFRAN-ODT) disintegrating tablet 4 mg  4 mg Oral Q6H PRN     respiratory distress  Cardiovascular: RRR, no m/r/g, no cyanosis or peripheral edema of extremities  GI: soft, resolved tenderness to the left lower quadrant , + bowel sounds.  No suprapubic tenderness  Neuro: moves all extremities, no focal deficits, normal speech.    Psych: appropriate mood and affect, no visual or auditory hallucinations    Labs: Results:   Chemistry Recent Labs     08/10/24  0924 08/11/24  1245   GLUCOSE 83 82    142   K 3.0* 3.1*    108   CO2 23 28   BUN 6* 6*   CREATININE 0.69 0.57*      CBC w/Diff Recent Labs     08/10/24  0105 08/11/24  0447   WBC 5.0 4.9   RBC 3.86* 3.85*   HGB 11.1* 11.1*   HCT 35.1 34.3*    246      Microbiology Results       Procedure Component Value Units Date/Time    Culture, Urine [[id number]] Collected: 08/08/24 1603    Order Status: Completed Specimen: Urine, clean catch Updated: 08/09/24 2137     Special Requests NO SPECIAL REQUESTS        Culture No growth (<1,000 CFU/ML)       Clostridium difficile toxin/antigen [[id number]]  (Abnormal) Collected: 08/07/24 2100    Order Status: Completed Specimen: Stool Updated: 08/08/24 1418     Reflex       See Reflex order for C. difficile (DNA)           C Diff Toxin Interpretation       Indeterminate for Toxigenic C. difficile, DNA confirmation to follow.          C. difficile toxin Molecular [[id number]]  (Abnormal) Collected: 08/07/24 2100    Order Status: Completed Specimen: Stool Updated: 08/08/24 1418     C. difficile toxin Molecular Positive        Comment: This specimen is positive for toxigenic C difficile by DNA amplification.  Repeat testing is not recommended, samples received within 7 days of this positive result will be rejected. Assay is not approved to test for cure, since nucleic acids may persist after effective treatment and may prompt false positive results.  CALLED TO AND CORRECTLY REPEATED BY:  ANDREZ BEYER RN 4S ON [id number] AT 1417 TO [facility]         Enteric Bact Panel, DNA [[id number]]

## 2024-08-12 NOTE — CARE COORDINATION
Discharge order noted for today. Orders received. No additional needs identified at this time. Pt to return home with daughter to provide transportation, daughter en route.    Case management remains available as needed.    RIZWAN Mak    Case Management Department

## 2024-08-30 ENCOUNTER — HOSPITAL ENCOUNTER (EMERGENCY)
Facility: HOSPITAL | Age: 54
Discharge: HOME | End: 2024-08-31
Attending: EMERGENCY MEDICINE
Payer: MEDICAID

## 2024-08-30 ENCOUNTER — APPOINTMENT (OUTPATIENT)
Facility: HOSPITAL | Age: 54
End: 2024-08-30
Payer: MEDICAID

## 2024-08-30 DIAGNOSIS — R10.84 GENERALIZED ABDOMINAL PAIN: Primary | ICD-10-CM

## 2024-08-30 LAB
ANION GAP SERPL CALC-SCNC: 4 MMOL/L (ref 3–18)
BASOPHILS # BLD: 0 K/UL (ref 0–0.1)
BASOPHILS NFR BLD: 0 % (ref 0–2)
BUN SERPL-MCNC: 13 MG/DL (ref 7–18)
BUN/CREAT SERPL: 15 (ref 12–20)
CALCIUM SERPL-MCNC: 8.9 MG/DL (ref 8.5–10.1)
CHLORIDE SERPL-SCNC: 110 MMOL/L (ref 100–111)
CO2 SERPL-SCNC: 25 MMOL/L (ref 21–32)
CREAT SERPL-MCNC: 0.85 MG/DL (ref 0.6–1.3)
DIFFERENTIAL METHOD BLD: ABNORMAL
EOSINOPHIL # BLD: 0.1 K/UL (ref 0–0.4)
EOSINOPHIL NFR BLD: 2 % (ref 0–5)
ERYTHROCYTE [DISTWIDTH] IN BLOOD BY AUTOMATED COUNT: 13.5 % (ref 11.6–14.5)
GLUCOSE SERPL-MCNC: 103 MG/DL (ref 74–99)
HCT VFR BLD AUTO: 36.2 % (ref 35–45)
HGB BLD-MCNC: 11.9 G/DL (ref 12–16)
IMM GRANULOCYTES # BLD AUTO: 0 K/UL (ref 0–0.04)
IMM GRANULOCYTES NFR BLD AUTO: 0 % (ref 0–0.5)
LIPASE SERPL-CCNC: 16 U/L (ref 13–75)
LYMPHOCYTES # BLD: 1.8 K/UL (ref 0.9–3.6)
LYMPHOCYTES NFR BLD: 29 % (ref 21–52)
MCH RBC QN AUTO: 28.8 PG (ref 24–34)
MCHC RBC AUTO-ENTMCNC: 32.9 G/DL (ref 31–37)
MCV RBC AUTO: 87.7 FL (ref 78–100)
MONOCYTES # BLD: 0.3 K/UL (ref 0.05–1.2)
MONOCYTES NFR BLD: 5 % (ref 3–10)
NEUTS SEG # BLD: 3.9 K/UL (ref 1.8–8)
NEUTS SEG NFR BLD: 63 % (ref 40–73)
NRBC # BLD: 0 K/UL (ref 0–0.01)
NRBC BLD-RTO: 0 PER 100 WBC
PLATELET # BLD AUTO: 276 K/UL (ref 135–420)
PMV BLD AUTO: 9.8 FL (ref 9.2–11.8)
POTASSIUM SERPL-SCNC: 3.6 MMOL/L (ref 3.5–5.5)
RBC # BLD AUTO: 4.13 M/UL (ref 4.2–5.3)
SODIUM SERPL-SCNC: 139 MMOL/L (ref 136–145)
TROPONIN I SERPL HS-MCNC: 4 NG/L (ref 0–54)
WBC # BLD AUTO: 6.1 K/UL (ref 4.6–13.2)

## 2024-08-30 PROCEDURE — 2580000003 HC RX 258: Performed by: EMERGENCY MEDICINE

## 2024-08-30 PROCEDURE — 83690 ASSAY OF LIPASE: CPT

## 2024-08-30 PROCEDURE — 6360000004 HC RX CONTRAST MEDICATION: Performed by: EMERGENCY MEDICINE

## 2024-08-30 PROCEDURE — 84484 ASSAY OF TROPONIN QUANT: CPT

## 2024-08-30 PROCEDURE — 99285 EMERGENCY DEPT VISIT HI MDM: CPT

## 2024-08-30 PROCEDURE — 96375 TX/PRO/DX INJ NEW DRUG ADDON: CPT

## 2024-08-30 PROCEDURE — 74177 CT ABD & PELVIS W/CONTRAST: CPT

## 2024-08-30 PROCEDURE — 85025 COMPLETE CBC W/AUTO DIFF WBC: CPT

## 2024-08-30 PROCEDURE — 96374 THER/PROPH/DIAG INJ IV PUSH: CPT

## 2024-08-30 PROCEDURE — 80048 BASIC METABOLIC PNL TOTAL CA: CPT

## 2024-08-30 PROCEDURE — 6360000002 HC RX W HCPCS: Performed by: EMERGENCY MEDICINE

## 2024-08-30 PROCEDURE — 93005 ELECTROCARDIOGRAM TRACING: CPT | Performed by: EMERGENCY MEDICINE

## 2024-08-30 RX ORDER — HYDROMORPHONE HYDROCHLORIDE 2 MG/ML
2 INJECTION, SOLUTION INTRAMUSCULAR; INTRAVENOUS; SUBCUTANEOUS
Status: COMPLETED | OUTPATIENT
Start: 2024-08-30 | End: 2024-08-30

## 2024-08-30 RX ORDER — DIPHENHYDRAMINE HYDROCHLORIDE 50 MG/ML
25 INJECTION INTRAMUSCULAR; INTRAVENOUS
Status: COMPLETED | OUTPATIENT
Start: 2024-08-30 | End: 2024-08-30

## 2024-08-30 RX ORDER — ONDANSETRON 2 MG/ML
4 INJECTION INTRAMUSCULAR; INTRAVENOUS
Status: COMPLETED | OUTPATIENT
Start: 2024-08-30 | End: 2024-08-30

## 2024-08-30 RX ORDER — 0.9 % SODIUM CHLORIDE 0.9 %
500 INTRAVENOUS SOLUTION INTRAVENOUS ONCE
Status: COMPLETED | OUTPATIENT
Start: 2024-08-30 | End: 2024-08-30

## 2024-08-30 RX ORDER — IOPAMIDOL 612 MG/ML
100 INJECTION, SOLUTION INTRAVASCULAR
Status: COMPLETED | OUTPATIENT
Start: 2024-08-30 | End: 2024-08-30

## 2024-08-30 RX ADMIN — SODIUM CHLORIDE 500 ML: 9 INJECTION, SOLUTION INTRAVENOUS at 22:43

## 2024-08-30 RX ADMIN — DIPHENHYDRAMINE HYDROCHLORIDE 25 MG: 50 INJECTION INTRAMUSCULAR; INTRAVENOUS at 23:20

## 2024-08-30 RX ADMIN — HYDROMORPHONE HYDROCHLORIDE 2 MG: 2 INJECTION, SOLUTION INTRAMUSCULAR; INTRAVENOUS; SUBCUTANEOUS at 23:21

## 2024-08-30 RX ADMIN — IOPAMIDOL 100 ML: 612 INJECTION, SOLUTION INTRAVENOUS at 23:15

## 2024-08-30 RX ADMIN — ONDANSETRON 4 MG: 2 INJECTION INTRAMUSCULAR; INTRAVENOUS at 22:08

## 2024-08-30 ASSESSMENT — ENCOUNTER SYMPTOMS
GASTROINTESTINAL NEGATIVE: 1
RESPIRATORY NEGATIVE: 1

## 2024-08-30 ASSESSMENT — PAIN - FUNCTIONAL ASSESSMENT: PAIN_FUNCTIONAL_ASSESSMENT: 0-10

## 2024-08-30 ASSESSMENT — PAIN SCALES - GENERAL
PAINLEVEL_OUTOF10: 9
PAINLEVEL_OUTOF10: 10

## 2024-08-31 VITALS
HEART RATE: 87 BPM | RESPIRATION RATE: 18 BRPM | SYSTOLIC BLOOD PRESSURE: 121 MMHG | DIASTOLIC BLOOD PRESSURE: 77 MMHG | OXYGEN SATURATION: 98 % | HEIGHT: 64 IN | TEMPERATURE: 98.7 F | WEIGHT: 210 LBS | BODY MASS INDEX: 35.85 KG/M2

## 2024-08-31 PROCEDURE — 96375 TX/PRO/DX INJ NEW DRUG ADDON: CPT

## 2024-08-31 PROCEDURE — 2580000003 HC RX 258: Performed by: EMERGENCY MEDICINE

## 2024-08-31 PROCEDURE — 6360000002 HC RX W HCPCS: Performed by: EMERGENCY MEDICINE

## 2024-08-31 PROCEDURE — 2500000003 HC RX 250 WO HCPCS: Performed by: EMERGENCY MEDICINE

## 2024-08-31 PROCEDURE — 96376 TX/PRO/DX INJ SAME DRUG ADON: CPT

## 2024-08-31 PROCEDURE — 6370000000 HC RX 637 (ALT 250 FOR IP): Performed by: EMERGENCY MEDICINE

## 2024-08-31 RX ORDER — FAMOTIDINE 10 MG/ML
INJECTION, SOLUTION INTRAVENOUS
Status: DISCONTINUED
Start: 2024-08-31 | End: 2024-08-31 | Stop reason: HOSPADM

## 2024-08-31 RX ORDER — HYDROXYZINE PAMOATE 25 MG/1
50 CAPSULE ORAL
Status: COMPLETED | OUTPATIENT
Start: 2024-08-31 | End: 2024-08-31

## 2024-08-31 RX ORDER — LEVETIRACETAM 500 MG/5ML
500 INJECTION, SOLUTION, CONCENTRATE INTRAVENOUS
Status: COMPLETED | OUTPATIENT
Start: 2024-08-31 | End: 2024-08-31

## 2024-08-31 RX ORDER — DIPHENHYDRAMINE HYDROCHLORIDE 50 MG/ML
25 INJECTION INTRAMUSCULAR; INTRAVENOUS
Status: COMPLETED | OUTPATIENT
Start: 2024-08-31 | End: 2024-08-31

## 2024-08-31 RX ADMIN — HYDROXYZINE PAMOATE 50 MG: 25 CAPSULE ORAL at 02:47

## 2024-08-31 RX ADMIN — DIPHENHYDRAMINE HYDROCHLORIDE 25 MG: 50 INJECTION INTRAMUSCULAR; INTRAVENOUS at 01:27

## 2024-08-31 RX ADMIN — LEVETIRACETAM 500 MG: 100 INJECTION INTRAVENOUS at 02:47

## 2024-08-31 RX ADMIN — GABAPENTIN 800 MG: 100 CAPSULE ORAL at 02:46

## 2024-08-31 RX ADMIN — WATER 40 MG: 1 INJECTION INTRAMUSCULAR; INTRAVENOUS; SUBCUTANEOUS at 00:24

## 2024-08-31 RX ADMIN — SODIUM CHLORIDE, PRESERVATIVE FREE 20 MG: 5 INJECTION INTRAVENOUS at 00:20

## 2024-08-31 NOTE — ED TRIAGE NOTES
Patient recently hospitalized for appendagitis and c-diff. States two days ago started having mid abdominal pain and vomiting.

## 2024-08-31 NOTE — ED PROVIDER NOTES
[facility] EMERGENCY DEPT  eMERGENCY dEPARTMENT eNCOUnter      Pt Name: Garrison Buenrostro  MRN: [id number]  Birthdate 1970 of evaluation: 8/30/2024  Provider:Adrian Andrews MD    CHIEF COMPLAINT       HPI    Garrison Buenrostro is a 54 y.o. female  c/o abdominal pain x 2 days.  Her abdominal pain having a sudden onset of epigastric abdominal pain this evening.  Patient recently hospitalized for appendagitis and c-diff.  Patient has chronic pain secondary to a previous hip fracture and leg fracture in the past.    ROS    Review of Systems   Constitutional: Negative.    HENT: Negative.     Respiratory: Negative.     Cardiovascular: Negative.    Gastrointestinal: Negative.    Genitourinary: Negative.    All other systems reviewed and are negative.    Except as noted above the remainder of the review of systems was reviewed and negative.       PAST MEDICAL HISTORY     Past Medical History:   Diagnosis Date    PTSD (post-traumatic stress disorder)     Sciatica     Seizures (HCC)          SURGICAL HISTORY       Past Surgical History:   Procedure Laterality Date    APPENDECTOMY      CYSTOSCOPY Left 5/25/2024    CYSTOSCOPY ; LEFT RETROGRADE PYELOGRAM; LEFT DOUBLE J URETERAL STENT INSERTION performed by Junior Ochoa MD at [facility] MAIN OR    CYSTOSCOPY Left 6/1/2024    CYSTOSCOPY LEFT RETROGRADE PYELOGRAM ,  LEFT DOUBLE J STENT EXCHANGE performed by Alessio Agrawal MD at [facility] MAIN OR    KIDNEY REMOVAL Right     2014    LEG SURGERY Left     URETER SURGERY Left 7/8/2024    Cystoscopy, left ureteroscopy, laser lithotripsy, stent exchange performed by Wood Julian MD at [facility] MAIN OR         CURRENTMEDICATIONS       Previous Medications    ALBUTEROL (PROVENTIL) (5 MG/ML) 0.5% NEBULIZER SOLUTION    Take 0.5 mLs by nebulization 4 times daily as needed for Wheezing    ALPRAZOLAM (XANAX) 1 MG TABLET    Take 1 tablet by mouth 4 times daily.    DICYCLOMINE (BENTYL) 20 MG TABLET    Take 1 tablet by mouth 4 times daily    GABAPENTIN (NEURONTIN)  14.5 %    Platelets 276 135 - 420 K/uL    MPV 9.8 9.2 - 11.8 FL    Nucleated RBCs 0.0 0  WBC    nRBC 0.00 0.00 - 0.01 K/uL    Neutrophils % 63 40 - 73 %    Lymphocytes % 29 21 - 52 %    Monocytes % 5 3 - 10 %    Eosinophils % 2 0 - 5 %    Basophils % 0 0 - 2 %    Immature Granulocytes % 0 0.0 - 0.5 %    Neutrophils Absolute 3.9 1.8 - 8.0 K/UL    Lymphocytes Absolute 1.8 0.9 - 3.6 K/UL    Monocytes Absolute 0.3 0.05 - 1.2 K/UL    Eosinophils Absolute 0.1 0.0 - 0.4 K/UL    Basophils Absolute 0.0 0.0 - 0.1 K/UL    Immature Granulocytes Absolute 0.0 0.00 - 0.04 K/UL    Differential Type AUTOMATED     Basic Metabolic Panel    Collection Time: 08/30/24  9:45 PM   Result Value Ref Range    Sodium 139 136 - 145 mmol/L    Potassium 3.6 3.5 - 5.5 mmol/L    Chloride 110 100 - 111 mmol/L    CO2 25 21 - 32 mmol/L    Anion Gap 4 3.0 - 18 mmol/L    Glucose 103 (H) 74 - 99 mg/dL    BUN 13 7.0 - 18 MG/DL    Creatinine 0.85 0.6 - 1.3 MG/DL    BUN/Creatinine Ratio 15 12 - 20      Est, Glom Filt Rate 82 >60 ml/min/1.73m2    Calcium 8.9 8.5 - 10.1 MG/DL   Troponin    Collection Time: 08/30/24  9:45 PM   Result Value Ref Range    Troponin, High Sensitivity 4 0 - 54 ng/L   Lipase    Collection Time: 08/30/24  9:45 PM   Result Value Ref Range    Lipase 16 13 - 75 U/L             PROCEDURES:    EKG interpreted by me.  EKG:  sinus tachycardia, no ectopy.     Unless otherwise noted below, none     Total CriticalCare time was *** minutes, excluding separately reportable procedures.  There was a high probability of clinically significant/life threatening deterioration in the patient's condition which required my urgent intervention.      EMERGENCY DEPARTMENT COURSE and DIFFERENTIALDIAGNOSIS/ MDM:   Vitals:    Vitals:    08/30/24 2118   BP: (!) 138/98   Pulse: (!) 114   Resp: 18   Temp: 98.7 °F (37.1 °C)   TempSrc: Oral   SpO2: 99%   Weight: 95.3 kg (210 lb)   Height: 1.626 m (5' 4\")       MDM      No orders to display         SCREENING  reviewed        No orders to display       10:43 PM  Upon re-evaluation the patient's symptoms have improved.  Patient general pruritus resolved.  Patient abdominal pain resolved. She has non-toxic appearance and condition is stable for discharge. Patient was informed of tests & results, instructed to f/u with PCP and return to the ED upon worsening of symptoms. All questions and concerns were addressed.       Procedures    FINAL IMPRESSION      Abdominal pain    Allergic reaction    DISPOSITION/PLAN     DISPOSITION  D/C home    Condition at Disposition: stable     DISCHARGE MEDICATIONS:    Benadryl, pepcid and solumedrol.    PATIENT REFERRED TO:  PCP in 1 to 2 days for F/U withoutf ail.  Paient to continue her current medications.  Presciptions for solumderol, pepcid and prednisone.  Continue current medications.    (Please note that portions of this note were completed with a voice recognitionprogram.  Efforts were made to edit the dictations but occasionally words are mis-transcribed.)    Adrian Andrews MD(electronically signed)  Attending Emergency Physician          Adrian Andrews MD  09/02/24 0700

## 2024-08-31 NOTE — ED NOTES
Patient called and c/o generalized itching.   Informed MD. Verbal order for pepcid and solumedrol as per MAR.   Patient speaking in full sentences, denies airway involvement

## 2024-09-01 LAB
EKG ATRIAL RATE: 101 BPM
EKG DIAGNOSIS: NORMAL
EKG P AXIS: 44 DEGREES
EKG P-R INTERVAL: 156 MS
EKG Q-T INTERVAL: 330 MS
EKG QRS DURATION: 76 MS
EKG QTC CALCULATION (BAZETT): 427 MS
EKG R AXIS: -9 DEGREES
EKG T AXIS: 21 DEGREES
EKG VENTRICULAR RATE: 101 BPM

## 2024-09-01 PROCEDURE — 93010 ELECTROCARDIOGRAM REPORT: CPT | Performed by: INTERNAL MEDICINE

## 2024-09-03 ENCOUNTER — APPOINTMENT (OUTPATIENT)
Facility: HOSPITAL | Age: 54
End: 2024-09-03
Payer: MEDICAID

## 2024-09-03 ENCOUNTER — HOSPITAL ENCOUNTER (EMERGENCY)
Facility: HOSPITAL | Age: 54
Discharge: HOME | End: 2024-09-03
Attending: EMERGENCY MEDICINE
Payer: MEDICAID

## 2024-09-03 VITALS
DIASTOLIC BLOOD PRESSURE: 106 MMHG | OXYGEN SATURATION: 97 % | WEIGHT: 200 LBS | BODY MASS INDEX: 34.15 KG/M2 | HEART RATE: 94 BPM | SYSTOLIC BLOOD PRESSURE: 171 MMHG | RESPIRATION RATE: 21 BRPM | TEMPERATURE: 98.2 F | HEIGHT: 64 IN

## 2024-09-03 DIAGNOSIS — R07.89 OTHER CHEST PAIN: Primary | ICD-10-CM

## 2024-09-03 DIAGNOSIS — M79.2 NEUROPATHIC PAIN: ICD-10-CM

## 2024-09-03 DIAGNOSIS — I16.0 HYPERTENSIVE URGENCY: ICD-10-CM

## 2024-09-03 LAB
ALBUMIN SERPL-MCNC: 4.2 G/DL (ref 3.4–5)
ALBUMIN/GLOB SERPL: 0.9 (ref 0.8–1.7)
ALP SERPL-CCNC: 106 U/L (ref 45–117)
ALT SERPL-CCNC: 32 U/L (ref 13–56)
ANION GAP SERPL CALC-SCNC: 11 MMOL/L (ref 3–18)
AST SERPL-CCNC: 14 U/L (ref 10–38)
BASOPHILS # BLD: 0 K/UL (ref 0–0.1)
BASOPHILS NFR BLD: 0 % (ref 0–2)
BILIRUB SERPL-MCNC: 0.6 MG/DL (ref 0.2–1)
BUN SERPL-MCNC: 14 MG/DL (ref 7–18)
BUN/CREAT SERPL: 19 (ref 12–20)
CALCIUM SERPL-MCNC: 9.8 MG/DL (ref 8.5–10.1)
CHLORIDE SERPL-SCNC: 104 MMOL/L (ref 100–111)
CO2 SERPL-SCNC: 24 MMOL/L (ref 21–32)
CREAT SERPL-MCNC: 0.74 MG/DL (ref 0.6–1.3)
DIFFERENTIAL METHOD BLD: ABNORMAL
EOSINOPHIL # BLD: 0 K/UL (ref 0–0.4)
EOSINOPHIL NFR BLD: 0 % (ref 0–5)
ERYTHROCYTE [DISTWIDTH] IN BLOOD BY AUTOMATED COUNT: 13.8 % (ref 11.6–14.5)
GLOBULIN SER CALC-MCNC: 4.9 G/DL (ref 2–4)
GLUCOSE SERPL-MCNC: 102 MG/DL (ref 74–99)
HCT VFR BLD AUTO: 39.3 % (ref 35–45)
HGB BLD-MCNC: 13.1 G/DL (ref 12–16)
IMM GRANULOCYTES # BLD AUTO: 0 K/UL (ref 0–0.04)
IMM GRANULOCYTES NFR BLD AUTO: 0 % (ref 0–0.5)
LACTATE BLD-SCNC: 0.99 MMOL/L (ref 0.4–2)
LIPASE SERPL-CCNC: 35 U/L (ref 13–75)
LYMPHOCYTES # BLD: 1.4 K/UL (ref 0.9–3.6)
LYMPHOCYTES NFR BLD: 19 % (ref 21–52)
MCH RBC QN AUTO: 29.1 PG (ref 24–34)
MCHC RBC AUTO-ENTMCNC: 33.3 G/DL (ref 31–37)
MCV RBC AUTO: 87.3 FL (ref 78–100)
MONOCYTES # BLD: 0.4 K/UL (ref 0.05–1.2)
MONOCYTES NFR BLD: 6 % (ref 3–10)
NEUTS SEG # BLD: 5.5 K/UL (ref 1.8–8)
NEUTS SEG NFR BLD: 74 % (ref 40–73)
NRBC # BLD: 0 K/UL (ref 0–0.01)
NRBC BLD-RTO: 0 PER 100 WBC
PLATELET # BLD AUTO: 359 K/UL (ref 135–420)
PMV BLD AUTO: 10.3 FL (ref 9.2–11.8)
POTASSIUM SERPL-SCNC: 3.2 MMOL/L (ref 3.5–5.5)
PROT SERPL-MCNC: 9.1 G/DL (ref 6.4–8.2)
RBC # BLD AUTO: 4.5 M/UL (ref 4.2–5.3)
SODIUM SERPL-SCNC: 139 MMOL/L (ref 136–145)
TROPONIN I SERPL HS-MCNC: 11 NG/L (ref 0–54)
WBC # BLD AUTO: 7.4 K/UL (ref 4.6–13.2)

## 2024-09-03 PROCEDURE — 83690 ASSAY OF LIPASE: CPT

## 2024-09-03 PROCEDURE — 80053 COMPREHEN METABOLIC PANEL: CPT

## 2024-09-03 PROCEDURE — 93005 ELECTROCARDIOGRAM TRACING: CPT

## 2024-09-03 PROCEDURE — 99285 EMERGENCY DEPT VISIT HI MDM: CPT

## 2024-09-03 PROCEDURE — 94761 N-INVAS EAR/PLS OXIMETRY MLT: CPT

## 2024-09-03 PROCEDURE — 84484 ASSAY OF TROPONIN QUANT: CPT

## 2024-09-03 PROCEDURE — 83605 ASSAY OF LACTIC ACID: CPT

## 2024-09-03 PROCEDURE — 6370000000 HC RX 637 (ALT 250 FOR IP)

## 2024-09-03 PROCEDURE — 85025 COMPLETE CBC W/AUTO DIFF WBC: CPT

## 2024-09-03 PROCEDURE — 71046 X-RAY EXAM CHEST 2 VIEWS: CPT

## 2024-09-03 RX ORDER — ACETAMINOPHEN 325 MG/1
650 TABLET ORAL
Status: COMPLETED | OUTPATIENT
Start: 2024-09-03 | End: 2024-09-03

## 2024-09-03 RX ORDER — LOSARTAN POTASSIUM 25 MG/1
25 TABLET ORAL DAILY
Qty: 30 TABLET | Refills: 0 | Status: SHIPPED | OUTPATIENT
Start: 2024-09-03 | End: 2024-09-03

## 2024-09-03 RX ORDER — GABAPENTIN 100 MG/1
200 CAPSULE ORAL 2 TIMES DAILY
Qty: 60 CAPSULE | Refills: 0 | Status: SHIPPED | OUTPATIENT
Start: 2024-09-03 | End: 2024-09-18

## 2024-09-03 RX ORDER — LOSARTAN POTASSIUM 25 MG/1
25 TABLET ORAL DAILY
Qty: 30 TABLET | Refills: 0 | Status: SHIPPED | OUTPATIENT
Start: 2024-09-03

## 2024-09-03 RX ORDER — LOSARTAN POTASSIUM 25 MG/1
25 TABLET ORAL DAILY
Status: DISCONTINUED | OUTPATIENT
Start: 2024-09-03 | End: 2024-09-03

## 2024-09-03 RX ADMIN — POTASSIUM BICARBONATE 40 MEQ: 782 TABLET, EFFERVESCENT ORAL at 19:44

## 2024-09-03 RX ADMIN — ACETAMINOPHEN 325MG 650 MG: 325 TABLET ORAL at 19:49

## 2024-09-03 ASSESSMENT — PAIN SCALES - GENERAL: PAINLEVEL_OUTOF10: 8

## 2024-09-03 NOTE — ED TRIAGE NOTES
WC to QR with daughter who states \"she has been very weak, her BP has been very high and she has been saying she has CP and SOB but also has pain to her LLQ of her abd\".

## 2024-09-03 NOTE — DISCHARGE INSTRUCTIONS
You were evaluated in the ED for chest pain.  The workup for your heart was negative for any acute process.  Your blood pressure was also elevated with reported history of elevated blood pressure readings at home when you arrived.  For this reason, we decided to start you on losartan 25 mg daily.  Please follow-up with your primary care physician in the next week for further management.  Contact your primary care provider or return to the ED if your symptoms worsen or do not improve.

## 2024-09-03 NOTE — FLOWSHEET NOTE
09/03/24 1900   Vital Signs   Pulse 94   Respirations 21   BP (!) 171/106   MAP (Calculated) 128   MAP (mmHg) 123   Oxygen Therapy   SpO2 97 %   Pulse via Oximetry 95 beats per minute     Provider aware of BP. Hx HTN.

## 2024-09-03 NOTE — ED PROVIDER NOTES
electrocardiograms.  Previous radiology studies.      Discussion of Mangement with other Physicians, [facility] or Appropriate Source: N/A        Diagnosis and Disposition     CLINICAL IMPRESSION:  1. Other chest pain    2. Neuropathic pain    3. Hypertensive urgency         Medication List        START taking these medications      losartan 25 MG tablet  Commonly known as: COZAAR  Take 1 tablet by mouth daily            CHANGE how you take these medications      gabapentin 100 MG capsule  Commonly known as: NEURONTIN  Take 2 capsules by mouth in the morning and at bedtime for 15 days. Max Daily Amount: 400 mg  What changed:   how much to take  when to take this            ASK your doctor about these medications      albuterol (5 MG/ML) 0.5% nebulizer solution  Commonly known as: PROVENTIL  Take 0.5 mLs by nebulization 4 times daily as needed for Wheezing     ALPRAZolam 1 MG tablet  Commonly known as: XANAX     dicyclomine 20 MG tablet  Commonly known as: BENTYL  Take 1 tablet by mouth 4 times daily     lactobacillus capsule  Take 1 capsule by mouth daily (with breakfast)     levETIRAcetam 1000 MG tablet  Commonly known as: KEPPRA  Take 1 tablet by mouth 2 times daily Take one tab twice a day     naloxone 4 MG/0.1ML Liqd nasal spray     omeprazole 40 MG delayed release capsule  Commonly known as: PRILOSEC     QUEtiapine 300 MG extended release tablet  Commonly known as: SEROquel XR     sertraline 100 MG tablet  Commonly known as: ZOLOFT     tamsulosin 0.4 MG capsule  Commonly known as: FLOMAX  Take 1 capsule by mouth daily     temazepam 30 MG capsule  Commonly known as: RESTORIL     tiZANidine 2 MG tablet  Commonly known as: ZANAFLEX     traZODone 100 MG tablet  Commonly known as: DESYREL     trospium 20 MG tablet  Commonly known as: SANCTURA  Take 1 tablet by mouth 2 times daily as needed (stent discomfort)     zolpidem 10 MG tablet  Commonly known as: AMBIEN               Where to Get Your Medications        These

## 2024-09-04 LAB
EKG ATRIAL RATE: 80 BPM
EKG DIAGNOSIS: NORMAL
EKG P AXIS: 53 DEGREES
EKG P-R INTERVAL: 152 MS
EKG Q-T INTERVAL: 372 MS
EKG QRS DURATION: 76 MS
EKG QTC CALCULATION (BAZETT): 429 MS
EKG R AXIS: -1 DEGREES
EKG T AXIS: 9 DEGREES
EKG VENTRICULAR RATE: 80 BPM

## 2024-09-04 PROCEDURE — 93010 ELECTROCARDIOGRAM REPORT: CPT | Performed by: INTERNAL MEDICINE

## 2024-09-29 ENCOUNTER — HOSPITAL ENCOUNTER (EMERGENCY)
Facility: HOSPITAL | Age: 54
Discharge: HOME | End: 2024-09-29
Attending: EMERGENCY MEDICINE
Payer: MEDICAID

## 2024-09-29 VITALS
BODY MASS INDEX: 32.44 KG/M2 | DIASTOLIC BLOOD PRESSURE: 92 MMHG | HEART RATE: 101 BPM | WEIGHT: 190 LBS | HEIGHT: 64 IN | OXYGEN SATURATION: 99 % | RESPIRATION RATE: 18 BRPM | TEMPERATURE: 98.6 F | SYSTOLIC BLOOD PRESSURE: 147 MMHG

## 2024-09-29 DIAGNOSIS — G62.9 NEUROPATHY: Primary | ICD-10-CM

## 2024-09-29 DIAGNOSIS — G89.29 OTHER CHRONIC PAIN: ICD-10-CM

## 2024-09-29 PROCEDURE — 99283 EMERGENCY DEPT VISIT LOW MDM: CPT

## 2024-09-29 PROCEDURE — 6370000000 HC RX 637 (ALT 250 FOR IP): Performed by: EMERGENCY MEDICINE

## 2024-09-29 RX ORDER — GABAPENTIN 300 MG/1
300 CAPSULE ORAL 3 TIMES DAILY
Qty: 21 CAPSULE | Refills: 0 | Status: SHIPPED | OUTPATIENT
Start: 2024-09-29 | End: 2024-10-06

## 2024-09-29 RX ORDER — GABAPENTIN 300 MG/1
300 CAPSULE ORAL
Status: COMPLETED | OUTPATIENT
Start: 2024-09-29 | End: 2024-09-29

## 2024-09-29 RX ADMIN — GABAPENTIN 300 MG: 300 CAPSULE ORAL at 13:28

## 2024-09-29 ASSESSMENT — PAIN DESCRIPTION - LOCATION: LOCATION: LEG

## 2024-09-29 ASSESSMENT — PAIN SCALES - GENERAL
PAINLEVEL_OUTOF10: 8
PAINLEVEL_OUTOF10: 8

## 2024-09-29 ASSESSMENT — PAIN DESCRIPTION - PAIN TYPE: TYPE: ACUTE PAIN

## 2024-09-29 ASSESSMENT — PAIN DESCRIPTION - ORIENTATION: ORIENTATION: LEFT

## 2024-09-29 ASSESSMENT — PAIN - FUNCTIONAL ASSESSMENT
PAIN_FUNCTIONAL_ASSESSMENT: 0-10
PAIN_FUNCTIONAL_ASSESSMENT: 0-10

## 2024-09-29 ASSESSMENT — PAIN DESCRIPTION - DESCRIPTORS: DESCRIPTORS: ACHING

## 2024-09-29 NOTE — DISCHARGE INSTRUCTIONS
I have given you a short course of gabapentin to [facility] you [facility] and [facility] prescriber of your medications.  Please return if you are at all worsened or concerned.

## 2024-09-29 NOTE — ED PROVIDER NOTES
EMERGENCY DEPARTMENT HISTORY AND PHYSICAL EXAM    1:25 PM      Date: 9/29/2024  Patient Name: Garrison Buenrostro    History of Presenting Illness     Chief Complaint   Patient presents with    Leg Pain    Anxiety       History From: Patient    Garrison Buenrostro is a 54 y.o. female   Patient is a 54-year-old female with history of solitary kidney, kidney stone, pyelonephritis, chronic pain, PTSD after serving in the , multiple surgeries to the left lower extremity after seizures and falls, presents emergency department with left lower extremity pain.  Patient notes that she gets a lot of pain in the left lower extremity specially when there is weather change in the weather is been changing frequently because of the storms abdomen coming through.  The patient was previously on gabapentin and it was provided by her psychiatrist but her psychiatrist told her she could no longer get the medication from her.  She started on slow taper of the gabapentin now is out of her medication for the last week.  The patient said that that medication tends to help with her symptoms and now she does not have it.  The patient has [facility] benefits given that she has injuries associated from serving in the  in [facility].  [facility] and they said they will be able to get her in to evaluate her situation to see if they can provide her medications.  There are no other known aggravating relieving factors.  Patient denies any fevers, chills, shortness of breath or any other aggravating or alleviating factors.           Nursing Notes were all reviewed and agreed with or any disagreements were addressed in the HPI.    PCP: No primary care provider on file.    Current Facility-Administered Medications   Medication Dose Route Frequency Provider Last Rate Last Admin    gabapentin (NEURONTIN) capsule 300 mg  300 mg Oral NOW Rolando Mccurdy MD         Current Outpatient Medications   Medication Sig Dispense

## 2024-09-29 NOTE — ED TRIAGE NOTES
Patient reports a history of a isi in her left leg and is having pain to the area. Patient reports being on gabapentin but she ran out four days ago.

## 2024-10-11 SDOH — HEALTH STABILITY: PHYSICAL HEALTH: ON AVERAGE, HOW MANY DAYS PER WEEK DO YOU ENGAGE IN MODERATE TO STRENUOUS EXERCISE (LIKE A BRISK WALK)?: 5 DAYS

## 2024-10-11 SDOH — HEALTH STABILITY: PHYSICAL HEALTH: ON AVERAGE, HOW MANY MINUTES DO YOU ENGAGE IN EXERCISE AT THIS LEVEL?: 30 MIN

## 2024-10-14 ENCOUNTER — OFFICE VISIT (OUTPATIENT)
Facility: CLINIC | Age: 54
End: 2024-10-14
Payer: MEDICAID

## 2024-10-14 ENCOUNTER — HOSPITAL ENCOUNTER (OUTPATIENT)
Facility: HOSPITAL | Age: 54
Discharge: HOME | End: 2024-10-17
Payer: MEDICAID

## 2024-10-14 VITALS
BODY MASS INDEX: 34.66 KG/M2 | OXYGEN SATURATION: 97 % | TEMPERATURE: 97.3 F | RESPIRATION RATE: 18 BRPM | HEIGHT: 64 IN | SYSTOLIC BLOOD PRESSURE: 130 MMHG | WEIGHT: 203 LBS | HEART RATE: 79 BPM | DIASTOLIC BLOOD PRESSURE: 80 MMHG

## 2024-10-14 DIAGNOSIS — Z13.0 SCREENING FOR ENDOCRINE, METABOLIC AND IMMUNITY DISORDER: ICD-10-CM

## 2024-10-14 DIAGNOSIS — Z13.0 SCREENING FOR ENDOCRINE, METABOLIC AND IMMUNITY DISORDER: Primary | ICD-10-CM

## 2024-10-14 DIAGNOSIS — Z13.29 SCREENING FOR ENDOCRINE, METABOLIC AND IMMUNITY DISORDER: ICD-10-CM

## 2024-10-14 DIAGNOSIS — F33.1 MODERATE EPISODE OF RECURRENT MAJOR DEPRESSIVE DISORDER (HCC): ICD-10-CM

## 2024-10-14 DIAGNOSIS — Z13.228 SCREENING FOR ENDOCRINE, METABOLIC AND IMMUNITY DISORDER: Primary | ICD-10-CM

## 2024-10-14 DIAGNOSIS — Z12.31 ENCOUNTER FOR SCREENING MAMMOGRAM FOR MALIGNANT NEOPLASM OF BREAST: ICD-10-CM

## 2024-10-14 DIAGNOSIS — Z12.4 SCREENING FOR CERVICAL CANCER: ICD-10-CM

## 2024-10-14 DIAGNOSIS — Z13.29 SCREENING FOR ENDOCRINE, METABOLIC AND IMMUNITY DISORDER: Primary | ICD-10-CM

## 2024-10-14 DIAGNOSIS — R56.9 SEIZURE (HCC): ICD-10-CM

## 2024-10-14 DIAGNOSIS — Z13.228 SCREENING FOR ENDOCRINE, METABOLIC AND IMMUNITY DISORDER: ICD-10-CM

## 2024-10-14 DIAGNOSIS — M54.32 SCIATICA OF LEFT SIDE: ICD-10-CM

## 2024-10-14 DIAGNOSIS — K21.9 GASTROESOPHAGEAL REFLUX DISEASE, UNSPECIFIED WHETHER ESOPHAGITIS PRESENT: ICD-10-CM

## 2024-10-14 LAB
ALBUMIN SERPL-MCNC: 3.8 G/DL (ref 3.4–5)
ALBUMIN/GLOB SERPL: 1.2 (ref 0.8–1.7)
ALP SERPL-CCNC: 83 U/L (ref 45–117)
ALT SERPL-CCNC: 18 U/L (ref 13–56)
ANION GAP SERPL CALC-SCNC: 5 MMOL/L (ref 3–18)
AST SERPL-CCNC: 16 U/L (ref 10–38)
BASOPHILS # BLD: 0 K/UL (ref 0–0.1)
BASOPHILS NFR BLD: 1 % (ref 0–2)
BILIRUB SERPL-MCNC: 0.3 MG/DL (ref 0.2–1)
BUN SERPL-MCNC: 12 MG/DL (ref 7–18)
BUN/CREAT SERPL: 15 (ref 12–20)
CALCIUM SERPL-MCNC: 9.4 MG/DL (ref 8.5–10.1)
CHLORIDE SERPL-SCNC: 108 MMOL/L (ref 100–111)
CHOLEST SERPL-MCNC: 181 MG/DL
CO2 SERPL-SCNC: 26 MMOL/L (ref 21–32)
CREAT SERPL-MCNC: 0.78 MG/DL (ref 0.6–1.3)
DIFFERENTIAL METHOD BLD: ABNORMAL
EOSINOPHIL # BLD: 0.1 K/UL (ref 0–0.4)
EOSINOPHIL NFR BLD: 2 % (ref 0–5)
ERYTHROCYTE [DISTWIDTH] IN BLOOD BY AUTOMATED COUNT: 13.5 % (ref 11.6–14.5)
EST. AVERAGE GLUCOSE BLD GHB EST-MCNC: 100 MG/DL
GLOBULIN SER CALC-MCNC: 3.3 G/DL (ref 2–4)
GLUCOSE SERPL-MCNC: 81 MG/DL (ref 74–99)
HBA1C MFR BLD: 5.1 % (ref 4.2–5.6)
HCT VFR BLD AUTO: 36.8 % (ref 35–45)
HDLC SERPL-MCNC: 74 MG/DL (ref 40–60)
HDLC SERPL: 2.4 (ref 0–5)
HGB BLD-MCNC: 11.6 G/DL (ref 12–16)
IMM GRANULOCYTES # BLD AUTO: 0 K/UL (ref 0–0.04)
IMM GRANULOCYTES NFR BLD AUTO: 0 % (ref 0–0.5)
LDLC SERPL CALC-MCNC: 92.8 MG/DL (ref 0–100)
LIPID PANEL: ABNORMAL
LYMPHOCYTES # BLD: 1.4 K/UL (ref 0.9–3.6)
LYMPHOCYTES NFR BLD: 33 % (ref 21–52)
MCH RBC QN AUTO: 29.1 PG (ref 24–34)
MCHC RBC AUTO-ENTMCNC: 31.5 G/DL (ref 31–37)
MCV RBC AUTO: 92.2 FL (ref 78–100)
MONOCYTES # BLD: 0.4 K/UL (ref 0.05–1.2)
MONOCYTES NFR BLD: 9 % (ref 3–10)
NEUTS SEG # BLD: 2.3 K/UL (ref 1.8–8)
NEUTS SEG NFR BLD: 55 % (ref 40–73)
NRBC # BLD: 0 K/UL (ref 0–0.01)
NRBC BLD-RTO: 0 PER 100 WBC
PLATELET # BLD AUTO: 230 K/UL (ref 135–420)
PMV BLD AUTO: 11.2 FL (ref 9.2–11.8)
POTASSIUM SERPL-SCNC: 4.4 MMOL/L (ref 3.5–5.5)
PROT SERPL-MCNC: 7.1 G/DL (ref 6.4–8.2)
RBC # BLD AUTO: 3.99 M/UL (ref 4.2–5.3)
SODIUM SERPL-SCNC: 139 MMOL/L (ref 136–145)
TRIGL SERPL-MCNC: 71 MG/DL
VLDLC SERPL CALC-MCNC: 14.2 MG/DL
WBC # BLD AUTO: 4.2 K/UL (ref 4.6–13.2)

## 2024-10-14 PROCEDURE — 80061 LIPID PANEL: CPT

## 2024-10-14 PROCEDURE — 80053 COMPREHEN METABOLIC PANEL: CPT

## 2024-10-14 PROCEDURE — 83036 HEMOGLOBIN GLYCOSYLATED A1C: CPT

## 2024-10-14 PROCEDURE — 85025 COMPLETE CBC W/AUTO DIFF WBC: CPT

## 2024-10-14 PROCEDURE — 99204 OFFICE O/P NEW MOD 45 MIN: CPT | Performed by: INTERNAL MEDICINE

## 2024-10-14 PROCEDURE — 36415 COLL VENOUS BLD VENIPUNCTURE: CPT

## 2024-10-14 RX ORDER — OMEPRAZOLE 40 MG/1
40 CAPSULE, DELAYED RELEASE ORAL DAILY
Qty: 30 CAPSULE | Refills: 0 | Status: SHIPPED | OUTPATIENT
Start: 2024-10-14

## 2024-10-14 RX ORDER — ONDANSETRON 4 MG/1
4 TABLET, ORALLY DISINTEGRATING ORAL EVERY 8 HOURS PRN
COMMUNITY
End: 2024-10-14 | Stop reason: SDUPTHER

## 2024-10-14 RX ORDER — ONDANSETRON 4 MG/1
4 TABLET, ORALLY DISINTEGRATING ORAL EVERY 8 HOURS PRN
Qty: 30 TABLET | Refills: 0 | Status: SHIPPED | OUTPATIENT
Start: 2024-10-14

## 2024-10-14 RX ORDER — TOPIRAMATE 25 MG/1
25 TABLET, FILM COATED ORAL AS NEEDED
COMMUNITY

## 2024-10-14 SDOH — ECONOMIC STABILITY: FOOD INSECURITY: WITHIN THE PAST 12 MONTHS, YOU WORRIED THAT YOUR FOOD WOULD RUN OUT BEFORE YOU GOT MONEY TO BUY MORE.: NEVER TRUE

## 2024-10-14 SDOH — ECONOMIC STABILITY: INCOME INSECURITY: HOW HARD IS IT FOR YOU TO PAY FOR THE VERY BASICS LIKE FOOD, HOUSING, MEDICAL CARE, AND HEATING?: NOT HARD AT ALL

## 2024-10-14 SDOH — ECONOMIC STABILITY: FOOD INSECURITY: WITHIN THE PAST 12 MONTHS, THE FOOD YOU BOUGHT JUST DIDN'T LAST AND YOU DIDN'T HAVE MONEY TO GET MORE.: NEVER TRUE

## 2024-10-14 ASSESSMENT — PATIENT HEALTH QUESTIONNAIRE - PHQ9
2. FEELING DOWN, DEPRESSED OR HOPELESS: SEVERAL DAYS
SUM OF ALL RESPONSES TO PHQ QUESTIONS 1-9: 2
SUM OF ALL RESPONSES TO PHQ9 QUESTIONS 1 & 2: 2
SUM OF ALL RESPONSES TO PHQ QUESTIONS 1-9: 2
1. LITTLE INTEREST OR PLEASURE IN DOING THINGS: SEVERAL DAYS

## 2024-10-14 ASSESSMENT — ENCOUNTER SYMPTOMS
GASTROINTESTINAL NEGATIVE: 1
RESPIRATORY NEGATIVE: 1
ALLERGIC/IMMUNOLOGIC NEGATIVE: 1
EYES NEGATIVE: 1
BACK PAIN: 1

## 2024-10-14 NOTE — PROGRESS NOTES
\"Have you been to the ER, urgent care clinic since your last visit?  Hospitalized since your last visit?\"    YES - When: approximately 2 months ago.  Where and Why: [facility]  kidney stone.    “Have you seen or consulted any other health care providers outside of [facility] since your last visit?”    NO    “Have you had a colorectal cancer screening such as a colonoscopy/FIT/Cologuard?    NO    No colonoscopy on file  No cologuard on file  No FIT/FOBT on file   No flexible sigmoidoscopy on file         “Have you had a pap smear?”    NO    No cervical cancer screening on file          
Exam  Constitutional:       Appearance: Normal appearance.   HENT:      Head: Normocephalic and atraumatic.      Nose: Nose normal.      Mouth/Throat:      Mouth: Mucous membranes are moist.   Eyes:      Extraocular Movements: Extraocular movements intact.      Pupils: Pupils are equal, round, and reactive to light.   Cardiovascular:      Rate and Rhythm: Normal rate and regular rhythm.      Pulses: Normal pulses.      Heart sounds: Normal heart sounds.   Pulmonary:      Effort: Pulmonary effort is normal.      Breath sounds: Normal breath sounds.   Abdominal:      General: Abdomen is flat.      Palpations: Abdomen is soft.   Musculoskeletal:         General: Normal range of motion.      Cervical back: Normal range of motion and neck supple.   Skin:     General: Skin is warm.   Neurological:      General: No focal deficit present.      Mental Status: She is alert. Mental status is at baseline.   Psychiatric:         Mood and Affect: Mood normal.         Behavior: Behavior normal.            Labs:     No results found for this visit on 10/14/24.       Active Problems:     Patient Active Problem List   Diagnosis    Lumbar radiculopathy    Migraine headache    Seizures ([facility])    Left renal stone    Pulmonary nodule seen on imaging study    Ureteral colic    Generalized abdominal pain    Anxiety    Moderate episode of recurrent major depressive disorder ([facility])    Respiratory failure    Acute hypoxic on chronic hypercapnic respiratory failure    Polypharmacy    Left upper quadrant abdominal pain    Ureteral stent present    Acute respiratory failure with hypoxia and hypercapnia    ARF (acute renal failure) ([facility])    Single kidney    Flank pain    Hypoxia    Sepsis without acute organ dysfunction ([facility])    Pyelonephritis    Intractable abdominal pain    Intractable pain    Colitis due to Clostridioides difficile    Seizure ([facility])    Sciatica of left side    Gastroesophageal reflux disease    Screening for cervical cancer

## 2024-11-05 DIAGNOSIS — G62.9 NEUROPATHY: ICD-10-CM

## 2024-11-05 RX ORDER — TRAMADOL HYDROCHLORIDE 50 MG/1
50 TABLET ORAL EVERY 6 HOURS PRN
COMMUNITY

## 2024-11-05 NOTE — TELEPHONE ENCOUNTER
Pt called in states she needs to get a refill on gabapentin (NEURONTIN) 300 MG capsule     And tramadol for pain management and is also req a pain management referral     Please advise.     Pharmacy   [id number] - [place] [id number] [facility] - P [phone] - F [phone] [[id number]]     Call back req     [facility] had sent Pt through triage they stated that Pt had a high BP spoke with Pt to ask if she was currently experiencing  any symptoms she just stated she wants to get gabapentin or tramadol.     Please advise.

## 2024-11-05 NOTE — TELEPHONE ENCOUNTER
----- Message from Amara CORRAL sent at 11/5/2024  1:32 PM EST -----  Regarding: [facility] Referral Request  [facility] Referral Request    Reason for referral request: Specialty Provider    Specialist/Lab/Test patient is requesting (if known):Pain management Doctor     Specialist Phone Number (if applicable):    Additional Information Patient mentioned that she had a bad experience with her previous doctor and she is looking for a new pain management doctor.   --------------------------------------------------------------------------------------------------------------------------    Relationship to Patient: Self     Call Back Information: OK to leave message on voicemail  Preferred Call Back Number: Phone [phone]    Patient referred to Dr. Holley.

## 2024-11-06 RX ORDER — TRAMADOL HYDROCHLORIDE 50 MG/1
50 TABLET ORAL EVERY 6 HOURS PRN
Qty: 30 TABLET | OUTPATIENT
Start: 2024-11-06

## 2024-11-06 RX ORDER — GABAPENTIN 300 MG/1
300 CAPSULE ORAL 3 TIMES DAILY
Qty: 21 CAPSULE | Refills: 0 | OUTPATIENT
Start: 2024-11-06 | End: 2024-11-13

## 2024-11-06 NOTE — TELEPHONE ENCOUNTER
Pt called states she tried to call Dr Holley office   She said she only got a recording she was unable to leave a message , advised patient to try again later , pt also asked if there is another pain management Dr she could get referred to   Please advise .

## 2024-11-07 ENCOUNTER — TELEPHONE (OUTPATIENT)
Facility: CLINIC | Age: 54
End: 2024-11-07

## 2024-11-07 NOTE — TELEPHONE ENCOUNTER
FYKIMBERLY     Patient was seen at [facility] ER on 11/6/2024 and denied pain medication. Patient is calling the office today asking for pain medication. Patient was advised again to contact her pain management provider. Patient states now she would like a new pain management provider. I reopened the referral she declined at her new patient appointment on 10/14/2024. Referral has been faxed with office note to Dr. Silva for review.

## 2024-11-07 NOTE — TELEPHONE ENCOUNTER
Problem: Occupational Therapy  Goal: Occupational Therapy Goal  Description: Goals to be met by: 10/26/2023     Patient will increase functional independence with ADLs by performing:    UE Dressing with Modified [place].  LE Dressing with Modified [place].  Grooming while standing with Modified [place].  Toilet transfer to toilet with Modified [place].    Outcome: Ongoing, Progressing       OT eval complete & goals established.   Pt called stating that Dr. Silva doesn't take medicaid pt stated that she would like to be referred to a doctor who accept her insurance pt stated that she's in a lot of pain.      Please advise

## 2024-11-07 NOTE — TELEPHONE ENCOUNTER
I spoke with the patient and advised she needs to contact her insurance to find out which pain management provider is in network and let us know in order to forward her referral. Patient understands and will let us know once she has found a participating provider.

## 2024-11-13 ENCOUNTER — OFFICE VISIT (OUTPATIENT)
Facility: CLINIC | Age: 54
End: 2024-11-13

## 2024-11-13 ENCOUNTER — TELEPHONE (OUTPATIENT)
Facility: CLINIC | Age: 54
End: 2024-11-13

## 2024-11-13 VITALS
DIASTOLIC BLOOD PRESSURE: 82 MMHG | SYSTOLIC BLOOD PRESSURE: 126 MMHG | WEIGHT: 207 LBS | BODY MASS INDEX: 35.34 KG/M2 | RESPIRATION RATE: 18 BRPM | OXYGEN SATURATION: 92 % | TEMPERATURE: 97.1 F | HEART RATE: 71 BPM | HEIGHT: 64 IN

## 2024-11-13 DIAGNOSIS — F41.9 ANXIETY: ICD-10-CM

## 2024-11-13 DIAGNOSIS — K21.9 GASTROESOPHAGEAL REFLUX DISEASE, UNSPECIFIED WHETHER ESOPHAGITIS PRESENT: ICD-10-CM

## 2024-11-13 DIAGNOSIS — R56.9 SEIZURE (HCC): ICD-10-CM

## 2024-11-13 DIAGNOSIS — E55.9 VITAMIN D DEFICIENCY: ICD-10-CM

## 2024-11-13 DIAGNOSIS — Z12.31 ENCOUNTER FOR SCREENING MAMMOGRAM FOR MALIGNANT NEOPLASM OF BREAST: ICD-10-CM

## 2024-11-13 DIAGNOSIS — Z12.4 SCREENING FOR CERVICAL CANCER: ICD-10-CM

## 2024-11-13 DIAGNOSIS — I10 PRIMARY HYPERTENSION: ICD-10-CM

## 2024-11-13 DIAGNOSIS — D64.9 ANEMIA, UNSPECIFIED TYPE: Primary | ICD-10-CM

## 2024-11-13 DIAGNOSIS — G43.909 MIGRAINE WITHOUT STATUS MIGRAINOSUS, NOT INTRACTABLE, UNSPECIFIED MIGRAINE TYPE: ICD-10-CM

## 2024-11-13 DIAGNOSIS — Z13.228 SCREENING FOR ENDOCRINE, METABOLIC AND IMMUNITY DISORDER: ICD-10-CM

## 2024-11-13 DIAGNOSIS — Z13.29 SCREENING FOR ENDOCRINE, METABOLIC AND IMMUNITY DISORDER: ICD-10-CM

## 2024-11-13 DIAGNOSIS — N20.0 LEFT RENAL STONE: ICD-10-CM

## 2024-11-13 DIAGNOSIS — E53.8 B12 DEFICIENCY: ICD-10-CM

## 2024-11-13 DIAGNOSIS — M54.16 LUMBAR RADICULOPATHY: ICD-10-CM

## 2024-11-13 DIAGNOSIS — Z13.0 SCREENING FOR ENDOCRINE, METABOLIC AND IMMUNITY DISORDER: ICD-10-CM

## 2024-11-13 DIAGNOSIS — Z12.11 SCREEN FOR COLON CANCER: ICD-10-CM

## 2024-11-13 RX ORDER — ONDANSETRON 4 MG/1
4 TABLET, ORALLY DISINTEGRATING ORAL EVERY 8 HOURS PRN
Qty: 30 TABLET | Refills: 1 | Status: SHIPPED | OUTPATIENT
Start: 2024-11-13

## 2024-11-13 RX ORDER — FERROUS SULFATE 325(65) MG
325 TABLET, DELAYED RELEASE (ENTERIC COATED) ORAL 2 TIMES DAILY
Qty: 90 TABLET | Refills: 3 | Status: SHIPPED | OUTPATIENT
Start: 2024-11-13

## 2024-11-13 RX ORDER — ERGOCALCIFEROL 1.25 MG/1
50000 CAPSULE, LIQUID FILLED ORAL
Qty: 12 CAPSULE | Refills: 1 | Status: SHIPPED | OUTPATIENT
Start: 2024-11-13

## 2024-11-13 SDOH — ECONOMIC STABILITY: INCOME INSECURITY: HOW HARD IS IT FOR YOU TO PAY FOR THE VERY BASICS LIKE FOOD, HOUSING, MEDICAL CARE, AND HEATING?: NOT HARD AT ALL

## 2024-11-13 SDOH — ECONOMIC STABILITY: FOOD INSECURITY: WITHIN THE PAST 12 MONTHS, THE FOOD YOU BOUGHT JUST DIDN'T LAST AND YOU DIDN'T HAVE MONEY TO GET MORE.: NEVER TRUE

## 2024-11-13 SDOH — ECONOMIC STABILITY: FOOD INSECURITY: WITHIN THE PAST 12 MONTHS, YOU WORRIED THAT YOUR FOOD WOULD RUN OUT BEFORE YOU GOT MONEY TO BUY MORE.: NEVER TRUE

## 2024-11-13 ASSESSMENT — PATIENT HEALTH QUESTIONNAIRE - PHQ9
SUM OF ALL RESPONSES TO PHQ QUESTIONS 1-9: 0
SUM OF ALL RESPONSES TO PHQ QUESTIONS 1-9: 0
1. LITTLE INTEREST OR PLEASURE IN DOING THINGS: NOT AT ALL
SUM OF ALL RESPONSES TO PHQ QUESTIONS 1-9: 0
SUM OF ALL RESPONSES TO PHQ9 QUESTIONS 1 & 2: 0
SUM OF ALL RESPONSES TO PHQ QUESTIONS 1-9: 0
2. FEELING DOWN, DEPRESSED OR HOPELESS: NOT AT ALL

## 2024-11-13 NOTE — PROGRESS NOTES
\"Have you been to the ER, urgent care clinic since your last visit?  Hospitalized since your last visit?\"    YES - When: approximately 2 days ago.  Where and Why: [facility] ER  pain.    “Have you seen or consulted any other health care providers outside of [facility] since your last visit?”    NO    “Have you had a colorectal cancer screening such as a colonoscopy/FIT/Cologuard?    NO    No colonoscopy on file  No cologuard on file  No FIT/FOBT on file   No flexible sigmoidoscopy on file         “Have you had a pap smear?”    NO    No cervical cancer screening on file

## 2024-11-14 NOTE — PROGRESS NOTES
Garrison Buenrostro (: 1970) is a 54 y.o. female, here for evaluation of the following chief complaint(s):  Follow-up (Follow up ER Thao Farrell Birgit elevated blood pressure and pain in back)       ASSESSMENT/PLAN:  Below is the assessment and plan developed based on review of pertinent history, physical exam, labs, studies, and medications.    1. Anemia, unspecified type  Assessment & Plan:   To start on iron supplements.  Orders:  -     ferrous sulfate (FE TABS) 325 (65 Fe) MG EC tablet; Take 1 tablet by mouth 2 times daily, Disp-90 tablet, R-3Normal  -     CBC with Auto Differential; Future  2. Gastroesophageal reflux disease, unspecified whether esophagitis present  Assessment & Plan:   Stable on Prilosec.  Zofran as needed.  Orders:  -     ondansetron (ZOFRAN-ODT) 4 MG disintegrating tablet; Take 1 tablet by mouth every 8 hours as needed for Nausea or Vomiting, Disp-30 tablet, R-1Normal  3. Screening for endocrine, metabolic and immunity disorder  -     Lipid Panel; Future  -     Comprehensive Metabolic Panel; Future  -     Hemoglobin A1C; Future  4. B12 deficiency  -     Vitamin B12; Future  5. Vitamin D deficiency  Assessment & Plan:   To start on supplements  Orders:  -     Vitamin D 25 Hydroxy; Future  -     vitamin D (ERGOCALCIFEROL) 1.25 MG (60578 UT) CAPS capsule; Take 1 capsule by mouth every 14 days, Disp-12 capsule, R-1Normal  6. Anxiety  Assessment & Plan:   Stable on current medications.  Follows with behavioral health.  7. Left renal stone  Assessment & Plan:   Patient to follow-up with urology.  8. Seizure (HCC)  Assessment & Plan:   Stable on Keppra.  Follows with neurology.  9. Primary hypertension  Assessment & Plan:   Blood pressure is stable on losartan  10. Screen for colon cancer  Assessment & Plan:   Patient had colonoscopy done about a year ago.  To be repeated every 10 years.    11. Encounter for screening mammogram for malignant neoplasm of breast  Assessment & Plan:   Mammogram

## 2024-11-19 DIAGNOSIS — R56.9 SEIZURES (HCC): ICD-10-CM

## 2024-11-20 RX ORDER — SERTRALINE HYDROCHLORIDE 100 MG/1
100 TABLET, FILM COATED ORAL DAILY
Qty: 30 TABLET | Refills: 11 | OUTPATIENT
Start: 2024-11-20

## 2024-11-20 RX ORDER — LEVETIRACETAM 1000 MG/1
1000 TABLET ORAL 2 TIMES DAILY
Qty: 60 TABLET | Refills: 11 | Status: SHIPPED | OUTPATIENT
Start: 2024-11-20

## 2024-11-20 RX ORDER — LOSARTAN POTASSIUM 25 MG/1
25 TABLET ORAL DAILY
Qty: 30 TABLET | Refills: 11 | Status: SHIPPED | OUTPATIENT
Start: 2024-11-20

## 2024-11-20 RX ORDER — ZOLPIDEM TARTRATE 10 MG/1
10 TABLET ORAL NIGHTLY
Qty: 30 TABLET | Refills: 5 | OUTPATIENT
Start: 2024-11-20

## 2024-11-20 NOTE — TELEPHONE ENCOUNTER
Pharmacy called to check on requested medications. Pt is a NEW PATIENT to Dr Montano and many of the medications were from previous provider. They want to make sure patient can continue with refills from new provider.

## 2024-12-11 DIAGNOSIS — K21.9 GASTROESOPHAGEAL REFLUX DISEASE, UNSPECIFIED WHETHER ESOPHAGITIS PRESENT: ICD-10-CM

## 2024-12-11 RX ORDER — ONDANSETRON 4 MG/1
4 TABLET, ORALLY DISINTEGRATING ORAL EVERY 8 HOURS PRN
Qty: 30 TABLET | Refills: 11 | Status: SHIPPED | OUTPATIENT
Start: 2024-12-11

## 2024-12-13 PROBLEM — Z12.31 ENCOUNTER FOR SCREENING MAMMOGRAM FOR MALIGNANT NEOPLASM OF BREAST: Status: RESOLVED | Noted: 2024-11-13 | Resolved: 2024-12-13

## 2024-12-13 PROBLEM — Z12.4 SCREENING FOR CERVICAL CANCER: Status: RESOLVED | Noted: 2024-10-14 | Resolved: 2024-12-13

## 2024-12-13 PROBLEM — Z12.11 SCREEN FOR COLON CANCER: Status: RESOLVED | Noted: 2024-11-13 | Resolved: 2024-12-13

## 2025-01-16 ENCOUNTER — TELEPHONE (OUTPATIENT)
Facility: CLINIC | Age: 55
End: 2025-01-16

## 2025-01-16 DIAGNOSIS — Z12.4 SCREENING FOR CERVICAL CANCER: Primary | ICD-10-CM

## 2025-01-16 NOTE — TELEPHONE ENCOUNTER
Patient contacted the office asking if we can put in a referral to Dr [facility] because the one we referred her to only had male doctors and she would prefer a female.       Fax: [phone]

## 2025-02-05 ENCOUNTER — TELEPHONE (OUTPATIENT)
Facility: CLINIC | Age: 55
End: 2025-02-05

## 2025-02-05 NOTE — TELEPHONE ENCOUNTER
Pt asking for a referral for GYN to be faxed to   [facility]    Phone# [phone]  FAX# [phone]  Pt states the other gyn office is to far for her

## 2025-04-21 ENCOUNTER — OFFICE VISIT (OUTPATIENT)
Facility: CLINIC | Age: 55
End: 2025-04-21
Payer: MEDICAID

## 2025-04-21 VITALS
HEIGHT: 64 IN | BODY MASS INDEX: 38.24 KG/M2 | OXYGEN SATURATION: 95 % | WEIGHT: 224 LBS | SYSTOLIC BLOOD PRESSURE: 137 MMHG | RESPIRATION RATE: 18 BRPM | HEART RATE: 100 BPM | DIASTOLIC BLOOD PRESSURE: 93 MMHG | TEMPERATURE: 97.7 F

## 2025-04-21 DIAGNOSIS — M54.9 BACK PAIN, UNSPECIFIED BACK LOCATION, UNSPECIFIED BACK PAIN LATERALITY, UNSPECIFIED CHRONICITY: Primary | ICD-10-CM

## 2025-04-21 PROCEDURE — 3080F DIAST BP >= 90 MM HG: CPT | Performed by: INTERNAL MEDICINE

## 2025-04-21 PROCEDURE — 3075F SYST BP GE 130 - 139MM HG: CPT | Performed by: INTERNAL MEDICINE

## 2025-04-21 PROCEDURE — 99213 OFFICE O/P EST LOW 20 MIN: CPT | Performed by: INTERNAL MEDICINE

## 2025-04-21 RX ORDER — NAPROXEN 500 MG/1
500 TABLET ORAL 2 TIMES DAILY WITH MEALS
Qty: 60 TABLET | Refills: 0 | Status: SHIPPED | OUTPATIENT
Start: 2025-04-21

## 2025-04-21 ASSESSMENT — PATIENT HEALTH QUESTIONNAIRE - PHQ9
SUM OF ALL RESPONSES TO PHQ QUESTIONS 1-9: 0
8. MOVING OR SPEAKING SO SLOWLY THAT OTHER PEOPLE COULD HAVE NOTICED. OR THE OPPOSITE, BEING SO FIGETY OR RESTLESS THAT YOU HAVE BEEN MOVING AROUND A LOT MORE THAN USUAL: NOT AT ALL
6. FEELING BAD ABOUT YOURSELF - OR THAT YOU ARE A FAILURE OR HAVE LET YOURSELF OR YOUR FAMILY DOWN: NOT AT ALL
3. TROUBLE FALLING OR STAYING ASLEEP: NOT AT ALL
7. TROUBLE CONCENTRATING ON THINGS, SUCH AS READING THE NEWSPAPER OR WATCHING TELEVISION: NOT AT ALL
9. THOUGHTS THAT YOU WOULD BE BETTER OFF DEAD, OR OF HURTING YOURSELF: NOT AT ALL
SUM OF ALL RESPONSES TO PHQ QUESTIONS 1-9: 0
1. LITTLE INTEREST OR PLEASURE IN DOING THINGS: NOT AT ALL
2. FEELING DOWN, DEPRESSED OR HOPELESS: NOT AT ALL
SUM OF ALL RESPONSES TO PHQ QUESTIONS 1-9: 0
4. FEELING TIRED OR HAVING LITTLE ENERGY: NOT AT ALL
SUM OF ALL RESPONSES TO PHQ QUESTIONS 1-9: 0
10. IF YOU CHECKED OFF ANY PROBLEMS, HOW DIFFICULT HAVE THESE PROBLEMS MADE IT FOR YOU TO DO YOUR WORK, TAKE CARE OF THINGS AT HOME, OR GET ALONG WITH OTHER PEOPLE: NOT DIFFICULT AT ALL
5. POOR APPETITE OR OVEREATING: NOT AT ALL

## 2025-04-21 NOTE — PROGRESS NOTES
Garrison Buenrostro presents today for   Chief Complaint   Patient presents with    Hip Pain     2 weeks hip pain. Pt described as shooting pain.  Been taking tramadol.      \"Have you been to the ER, urgent care clinic since your last visit?  Hospitalized since your last visit?\"    NO    “Have you seen or consulted any other health care providers outside of [facility] since your last visit?”    NO    “Have you had a colorectal cancer screening such as a colonoscopy/FIT/Cologuard?    YES - Type: Cologuard     No colonoscopy on file  No cologuard on file  No FIT/FOBT on file   No flexible sigmoidoscopy on file         “Have you had a pap smear?”    NO    No cervical cancer screening on file           unable to elicit, did observe LUE mvt (reach toward abd) and R elbow flex

## 2025-04-21 NOTE — PROGRESS NOTES
Garrison Buenrostro (: 1970) is a 54 y.o. female, here for evaluation of the following chief complaint(s):  Hip Pain       ASSESSMENT/PLAN:  Below is the assessment and plan developed based on review of pertinent history, physical exam, labs, studies, and medications.    1. Back pain, unspecified back location, unspecified back pain laterality, unspecified chronicity  Assessment & Plan:   Patient to follow-up with pain management.  Orders:  -     External Referral To Pain Clinic  -     naproxen (NAPROSYN) 500 MG tablet; Take 1 tablet by mouth 2 times daily (with meals), Disp-60 tablet, R-0Normal      Return in about 2 weeks (around 2025) for labs 1 week before next OV, follow up on chronic conditions.     SUBJECTIVE/OBJECTIVE:  Hip Pain       Patient is complaining of mild thoracic back pain on the left side radiating to behind left shoulder.    No chest pain or palpitations or shortness of breath.  No GI/ symptoms.    Patient requesting refill for tramadol.  Patient was informed that we do not prescribe opioids.  Patient is to contact her pain management.    Patient is requesting a new referral for pain management.                    ROS as above    Vitals:    25 1549   BP: (!) 137/93   BP Site: Left Upper Arm   Patient Position: Sitting   BP Cuff Size: Large Adult   Pulse: 100   Resp: 18   Temp: 97.7 °F (36.5 °C)   TempSrc: Temporal   SpO2: 95%   Weight: 101.6 kg (224 lb)   Height: 1.626 m (5' 4\")     Physical Exam  Constitutional:       Appearance: Normal appearance.   HENT:      Head: Normocephalic and atraumatic.      Nose: Nose normal.      Mouth/Throat:      Mouth: Mucous membranes are dry.   Eyes:      Extraocular Movements: Extraocular movements intact.      Pupils: Pupils are equal, round, and reactive to light.   Cardiovascular:      Rate and Rhythm: Normal rate and regular rhythm.      Pulses: Normal pulses.      Heart sounds: Normal heart sounds.   Pulmonary:      Effort: Pulmonary effort

## 2025-05-12 ENCOUNTER — TELEPHONE (OUTPATIENT)
Facility: CLINIC | Age: 55
End: 2025-05-12

## (undated) PROCEDURE — 02HV33Z INSERTION OF INFUSION DEVICE INTO SUPERIOR VENA CAVA, PERCUTANEOUS APPROACH: ICD-10-PCS

## (undated) DEVICE — PACK,CYSTOSCOPY,PK I,AURORA: Brand: MEDLINE

## (undated) DEVICE — KIT OR TURNOVER

## (undated) DEVICE — TRAY PREP DRY W/ PREM GLV 2 APPL 6 SPNG 2 UNDPD 1 OVERWRAP

## (undated) DEVICE — JELLY,LUBE,STERILE,FLIP TOP,TUBE,4-OZ: Brand: MEDLINE

## (undated) DEVICE — Y-TYPE TUR/BLADDER IRRIGATION SET, REGULATING CLAMP

## (undated) DEVICE — MEDI-VAC NON-CONDUCTIVE SUCTION TUBING: Brand: CARDINAL HEALTH

## (undated) DEVICE — MANIFOLD CART ULT HI FLOW W 3 PRT FOR SUCT

## (undated) DEVICE — OPEN-END URETERAL CATHETER: Brand: COOK

## (undated) DEVICE — Device

## (undated) DEVICE — JELLY LUBRICATING 2.7GM H2O SOL GREASELESS E-Z

## (undated) DEVICE — GOWN,PREVENTION PLUS,XLN/XL,ST,24/CS: Brand: MEDLINE

## (undated) DEVICE — STER SINGLE BASIN SET W/BOWLS: Brand: CARDINAL HEALTH

## (undated) DEVICE — UROLOGY SET

## (undated) DEVICE — GAUZE,SPONGE,4"X4",16PLY,STRL,LF,10/TRAY: Brand: MEDLINE

## (undated) DEVICE — Device: Brand: SINGLE USE GRASPING FORCEPS FG-253SX

## (undated) DEVICE — STERILE POLYISOPRENE POWDER-FREE SURGICAL GLOVES: Brand: PROTEXIS

## (undated) DEVICE — SYRINGE MED 30ML STD CLR PLAS LUERLOCK TIP N CTRL DISP

## (undated) DEVICE — DRAPE TWL SURG 16X26IN BLU ORB04] ALLCARE INC]

## (undated) DEVICE — GUIDEWIRE UROLOGICAL STR 3 CM 0.038 INX150 CM DUAL-FLEX

## (undated) DEVICE — GUIDEWIRE URO L150CM DIA0.038IN STD NIT HYDRPHLC STR TIP

## (undated) DEVICE — SOLUTION IRRIG 3000ML 0.9% SOD CHL FLX CONT 0797208] ICU MEDICAL INC]

## (undated) DEVICE — BAG DRAINAGE CUST DISP

## (undated) DEVICE — CHECK-FLO ADAPTER: Brand: CHECK-FLO

## (undated) DEVICE — SOLUTION IRRIG 1000ML 0.9% SOD CHL USP POUR PLAS BTL

## (undated) DEVICE — SPECIMEN COLLECTION 4-PORT MANIFOLD: Brand: NEPTUNE 2

## (undated) DEVICE — SYRINGE MED 10ML LUERLOCK TIP W/O SFTY DISP

## (undated) DEVICE — FLEXOR, URETERAL ACCESS SHEATH WITH AQ, HYDROPHILIC COATING: Brand: FLEXOR

## (undated) DEVICE — GUIDEWIRE ENDOSCP L150CM DIA0.035IN TIP 3CM PTFE NIT

## (undated) DEVICE — SOLUTION IRRIG 3000ML H2O STRL BAG

## (undated) DEVICE — SINGLE-USE DIGITAL FLEXIBLE URETEROSCOPE: Brand: LITHOVUE